# Patient Record
Sex: FEMALE | Race: WHITE | NOT HISPANIC OR LATINO | Employment: OTHER | URBAN - METROPOLITAN AREA
[De-identification: names, ages, dates, MRNs, and addresses within clinical notes are randomized per-mention and may not be internally consistent; named-entity substitution may affect disease eponyms.]

---

## 2017-05-02 ENCOUNTER — HOSPITAL ENCOUNTER (OUTPATIENT)
Dept: RADIOLOGY | Facility: HOSPITAL | Age: 62
Discharge: HOME | End: 2017-05-02
Payer: COMMERCIAL

## 2017-05-02 ENCOUNTER — TRANSCRIBE ORDERS (OUTPATIENT)
Dept: ADMINISTRATIVE | Facility: HOSPITAL | Age: 62
End: 2017-05-02

## 2017-05-02 ENCOUNTER — APPOINTMENT (OUTPATIENT)
Dept: LAB | Facility: HOSPITAL | Age: 62
End: 2017-05-02
Payer: COMMERCIAL

## 2017-05-02 DIAGNOSIS — R07.1 PAINFUL RESPIRATION: Primary | ICD-10-CM

## 2017-05-02 DIAGNOSIS — R79.1 ABNORMAL BLOOD COAGULATION PROFILE: ICD-10-CM

## 2017-05-02 LAB — DEPRECATED D DIMER PPP: 200 NG/ML (FEU) (ref 190–520)

## 2017-05-02 PROCEDURE — 85379 FIBRIN DEGRADATION QUANT: CPT | Performed by: INTERNAL MEDICINE

## 2017-05-02 PROCEDURE — 71020 HB CHEST X-RAY 2VW FRONTAL&LATL: CPT

## 2017-05-02 PROCEDURE — 36415 COLL VENOUS BLD VENIPUNCTURE: CPT | Performed by: INTERNAL MEDICINE

## 2017-05-03 ENCOUNTER — HOSPITAL ENCOUNTER (OUTPATIENT)
Dept: RADIOLOGY | Facility: HOSPITAL | Age: 62
Discharge: HOME | End: 2017-05-03
Payer: COMMERCIAL

## 2017-05-03 DIAGNOSIS — R07.1 PAINFUL RESPIRATION: ICD-10-CM

## 2017-05-03 DIAGNOSIS — R79.1 ABNORMAL BLOOD COAGULATION PROFILE: ICD-10-CM

## 2017-05-03 PROCEDURE — 71275 CT ANGIOGRAPHY CHEST: CPT

## 2017-05-03 RX ADMIN — IOHEXOL 85 ML: 350 INJECTION, SOLUTION INTRAVENOUS at 15:29

## 2018-10-22 ENCOUNTER — TRANSCRIBE ORDERS (OUTPATIENT)
Dept: ADMINISTRATIVE | Facility: HOSPITAL | Age: 63
End: 2018-10-22

## 2018-10-22 DIAGNOSIS — R10.13 EPIGASTRIC PAIN: Primary | ICD-10-CM

## 2018-10-26 ENCOUNTER — HOSPITAL ENCOUNTER (OUTPATIENT)
Dept: RADIOLOGY | Facility: HOSPITAL | Age: 63
Discharge: HOME | End: 2018-10-26
Attending: INTERNAL MEDICINE
Payer: COMMERCIAL

## 2018-10-26 DIAGNOSIS — R10.13 EPIGASTRIC PAIN: ICD-10-CM

## 2018-10-26 PROCEDURE — 74246 X-RAY XM UPR GI TRC 2CNTRST: CPT

## 2019-02-14 ENCOUNTER — APPOINTMENT (EMERGENCY)
Dept: RADIOLOGY | Facility: HOSPITAL | Age: 64
End: 2019-02-14
Payer: COMMERCIAL

## 2019-02-14 ENCOUNTER — HOSPITAL ENCOUNTER (EMERGENCY)
Facility: HOSPITAL | Age: 64
Discharge: HOME | End: 2019-02-14
Attending: EMERGENCY MEDICINE | Admitting: EMERGENCY MEDICINE
Payer: COMMERCIAL

## 2019-02-14 VITALS
TEMPERATURE: 97.8 F | RESPIRATION RATE: 15 BRPM | SYSTOLIC BLOOD PRESSURE: 132 MMHG | HEART RATE: 90 BPM | DIASTOLIC BLOOD PRESSURE: 79 MMHG | OXYGEN SATURATION: 97 %

## 2019-02-14 DIAGNOSIS — R07.9 CHEST PAIN: Primary | ICD-10-CM

## 2019-02-14 LAB
ALBUMIN SERPL BCP-MCNC: 4.1 G/DL (ref 3.5–5)
ALP SERPL-CCNC: 73 U/L (ref 46–116)
ALT SERPL W P-5'-P-CCNC: 27 U/L (ref 12–78)
ANION GAP SERPL CALCULATED.3IONS-SCNC: 7 MMOL/L (ref 4–13)
APTT PPP: 24 SECONDS (ref 26–38)
AST SERPL W P-5'-P-CCNC: 24 U/L (ref 5–45)
BASOPHILS # BLD AUTO: 0.05 THOUSANDS/ΜL (ref 0–0.1)
BASOPHILS NFR BLD AUTO: 1 % (ref 0–1)
BILIRUB SERPL-MCNC: 0.5 MG/DL (ref 0.2–1)
BUN SERPL-MCNC: 17 MG/DL (ref 5–25)
CALCIUM SERPL-MCNC: 8.9 MG/DL (ref 8.3–10.1)
CHLORIDE SERPL-SCNC: 100 MMOL/L (ref 100–108)
CO2 SERPL-SCNC: 32 MMOL/L (ref 21–32)
CREAT SERPL-MCNC: 0.78 MG/DL (ref 0.6–1.3)
DEPRECATED D DIMER PPP: 220 NG/ML (FEU) (ref 190–520)
EOSINOPHIL # BLD AUTO: 0.29 THOUSAND/ΜL (ref 0–0.61)
EOSINOPHIL NFR BLD AUTO: 4 % (ref 0–6)
ERYTHROCYTE [DISTWIDTH] IN BLOOD BY AUTOMATED COUNT: 12 % (ref 11.6–15.1)
GFR SERPL CREATININE-BSD FRML MDRD: 82 ML/MIN/1.73SQ M
GLUCOSE SERPL-MCNC: 109 MG/DL (ref 65–140)
HCT VFR BLD AUTO: 41 % (ref 34.8–46.1)
HGB BLD-MCNC: 13.2 G/DL (ref 11.5–15.4)
IMM GRANULOCYTES # BLD AUTO: 0.02 THOUSAND/UL (ref 0–0.2)
IMM GRANULOCYTES NFR BLD AUTO: 0 % (ref 0–2)
INR PPP: 0.94 (ref 0.86–1.16)
LYMPHOCYTES # BLD AUTO: 1.31 THOUSANDS/ΜL (ref 0.6–4.47)
LYMPHOCYTES NFR BLD AUTO: 20 % (ref 14–44)
MCH RBC QN AUTO: 34.8 PG (ref 26.8–34.3)
MCHC RBC AUTO-ENTMCNC: 32.2 G/DL (ref 31.4–37.4)
MCV RBC AUTO: 108 FL (ref 82–98)
MONOCYTES # BLD AUTO: 0.63 THOUSAND/ΜL (ref 0.17–1.22)
MONOCYTES NFR BLD AUTO: 10 % (ref 4–12)
NEUTROPHILS # BLD AUTO: 4.25 THOUSANDS/ΜL (ref 1.85–7.62)
NEUTS SEG NFR BLD AUTO: 65 % (ref 43–75)
NRBC BLD AUTO-RTO: 0 /100 WBCS
NT-PROBNP SERPL-MCNC: 28 PG/ML
PLATELET # BLD AUTO: 260 THOUSANDS/UL (ref 149–390)
PMV BLD AUTO: 9.3 FL (ref 8.9–12.7)
POTASSIUM SERPL-SCNC: 3.9 MMOL/L (ref 3.5–5.3)
PROT SERPL-MCNC: 6.7 G/DL (ref 6.4–8.2)
PROTHROMBIN TIME: 9.9 SECONDS (ref 9.4–11.7)
RBC # BLD AUTO: 3.79 MILLION/UL (ref 3.81–5.12)
SODIUM SERPL-SCNC: 139 MMOL/L (ref 136–145)
TROPONIN I SERPL-MCNC: <0.02 NG/ML
TROPONIN I SERPL-MCNC: <0.02 NG/ML
WBC # BLD AUTO: 6.55 THOUSAND/UL (ref 4.31–10.16)

## 2019-02-14 PROCEDURE — 96360 HYDRATION IV INFUSION INIT: CPT

## 2019-02-14 PROCEDURE — 85379 FIBRIN DEGRADATION QUANT: CPT | Performed by: EMERGENCY MEDICINE

## 2019-02-14 PROCEDURE — 85730 THROMBOPLASTIN TIME PARTIAL: CPT | Performed by: EMERGENCY MEDICINE

## 2019-02-14 PROCEDURE — 85610 PROTHROMBIN TIME: CPT | Performed by: EMERGENCY MEDICINE

## 2019-02-14 PROCEDURE — 93005 ELECTROCARDIOGRAM TRACING: CPT

## 2019-02-14 PROCEDURE — 99285 EMERGENCY DEPT VISIT HI MDM: CPT

## 2019-02-14 PROCEDURE — 80053 COMPREHEN METABOLIC PANEL: CPT | Performed by: EMERGENCY MEDICINE

## 2019-02-14 PROCEDURE — 85025 COMPLETE CBC W/AUTO DIFF WBC: CPT | Performed by: EMERGENCY MEDICINE

## 2019-02-14 PROCEDURE — 83880 ASSAY OF NATRIURETIC PEPTIDE: CPT | Performed by: EMERGENCY MEDICINE

## 2019-02-14 PROCEDURE — 71045 X-RAY EXAM CHEST 1 VIEW: CPT

## 2019-02-14 PROCEDURE — 36415 COLL VENOUS BLD VENIPUNCTURE: CPT | Performed by: EMERGENCY MEDICINE

## 2019-02-14 PROCEDURE — 84484 ASSAY OF TROPONIN QUANT: CPT | Performed by: EMERGENCY MEDICINE

## 2019-02-14 RX ORDER — NITROGLYCERIN 0.4 MG/1
0.4 TABLET SUBLINGUAL ONCE
Status: COMPLETED | OUTPATIENT
Start: 2019-02-14 | End: 2019-02-14

## 2019-02-14 RX ADMIN — SODIUM CHLORIDE 1000 ML: 0.9 INJECTION, SOLUTION INTRAVENOUS at 19:52

## 2019-02-14 RX ADMIN — NITROGLYCERIN 0.4 MG: 0.4 TABLET SUBLINGUAL at 19:52

## 2019-02-15 NOTE — DISCHARGE INSTRUCTIONS
If any worsening of symptoms please come back to the emergency department    Please follow-up with the cardiologist like we discussed

## 2019-02-15 NOTE — ED PROVIDER NOTES
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  No fevers or chills  States for the past few days she has been short of breath  Unable to tell me if it is worse with ambulation or exertion  Denies any trauma  No history of PE or DVT  66 year female that presents with chest pain  Patient has no obvious trauma  Will do a cardiac workup including a D-dimer  None       Past Medical History:   Diagnosis Date    Anemia     Cancer [facility])        History reviewed  No pertinent surgical history  History reviewed  No pertinent family history  I have reviewed and agree with the history as documented  Social History     Tobacco Use    Smoking status: Former Smoker    Smokeless tobacco: Never Used   Substance Use Topics    Alcohol use: Yes     Comment: liter nightly    Drug use: Never        Review of Systems   Constitutional: Negative  Negative for diaphoresis and fever  HENT: Negative  Respiratory: Positive for shortness of breath  Negative for cough and wheezing  Cardiovascular: Positive for chest pain  Negative for palpitations and leg swelling  Gastrointestinal: Negative for abdominal distention, abdominal pain, nausea and vomiting  Genitourinary: Negative  Musculoskeletal: Negative  Skin: Negative  Neurological: Negative  Psychiatric/Behavioral: Negative  All other systems reviewed and are negative        Physical Exam  Physical Exam   Constitutional: She is oriented to person, place, and time  She appears well-developed and well-nourished  Non-toxic appearance  She does not appear ill  No distress  HENT:   Head: Normocephalic and atraumatic  Eyes: Pupils are equal, round, and reactive to light  EOM are normal    Neck: Normal range of motion  Neck supple  Cardiovascular: Normal rate, regular rhythm, normal heart sounds and normal pulses  No murmur heard  Pulmonary/Chest: Effort normal and breath sounds normal  No stridor  No respiratory distress  Abdominal: Soft  Bowel sounds are normal  She exhibits no distension  There is no tenderness  There is no rebound and no guarding  Musculoskeletal: Normal range of motion  Right lower leg: Normal  She exhibits no edema  Left lower leg: Normal  She exhibits no edema  Neurological: She is alert and oriented to person, place, and time  Skin: Skin is warm and dry  Capillary refill takes less than 2 seconds  Psychiatric: She has a normal mood and affect  Vitals reviewed        Vital Signs  ED Triage Vitals   Temperature Pulse Respirations Blood Pressure SpO2   02/14/19 2016 02/14/19 1915 02/14/19 1915 02/14/19 1915 02/14/19 1915   97 8 °F (36 6 °C) 100 (!) 24 139/87 95 %      Temp src Heart Rate Source Patient Position - Orthostatic VS BP Location FiO2 (%)   -- -- -- 02/14/19 2015 --      Left arm       Pain Score       02/14/19 2014       No Pain           Vitals:    02/14/19 2100 02/14/19 2130 02/14/19 2145 02/14/19 2200   BP: 134/90 132/81 127/79 132/79   Pulse: 96 96 90 90       Visual Acuity      ED Medications  Medications   sodium chloride 0 9 % bolus 1,000 mL (0 mL Intravenous Stopped 2/14/19 2121)   nitroglycerin (NITROSTAT) SL tablet 0 4 mg (0 4 mg Sublingual Given 2/14/19 1952)       Diagnostic Studies  Results Reviewed     Procedure Component Value Units Date/Time    Troponin I [[id number]]  (Normal) Collected:  02/14/19 2155    Lab Status:  Final result Specimen:  Blood from Arm, Right Updated:  02/14/19 2219     Troponin I <0 02 ng/mL     D-Dimer [[id number]]  (Normal) Collected:  02/14/19 1921    Lab Status:  Final result Specimen:  Blood from Arm, Left Updated:  02/14/19 2038     D-Dimer, Quant 220 ng/ml (FEU)     B-type natriuretic peptide [[id number]]  (Normal) Collected:  02/14/19 1921    Lab Status:  Final result Specimen:  Blood from Arm, Left Updated:  02/14/19 1951     NT-proBNP 28 pg/mL     Troponin I [[id number]]  (Normal) Collected:  02/14/19 1921    Lab Status:  Final result Specimen:  Blood from Arm, Left Updated:  02/14/19 1950     Troponin I <0 02 ng/mL     Protime-INR [[id number]]  (Normal) Collected:  02/14/19 1921    Lab Status:  Final result Specimen:  Blood from Arm, Left Updated:  02/14/19 1946     Protime 9 9 seconds      INR 0 94    APTT [[id number]]  (Abnormal) Collected:  02/14/19 1921    Lab Status:  Final result Specimen:  Blood from Arm, Left Updated:  02/14/19 1946     PTT 24 seconds     Comprehensive metabolic panel [[id number]] Collected:  02/14/19 1921    Lab Status:  Final result Specimen:  Blood from Arm, Left Updated:  02/14/19 1945     Sodium 139 mmol/L      Potassium 3 9 mmol/L      Chloride 100 mmol/L      CO2 32 mmol/L      ANION GAP 7 mmol/L      BUN 17 mg/dL      Creatinine 0 78 mg/dL      Glucose 109 mg/dL      Calcium 8 9 mg/dL      AST 24 U/L      ALT 27 U/L      Alkaline Phosphatase 73 U/L      Total Protein 6 7 g/dL      Albumin 4 1 g/dL      Total Bilirubin 0 50 mg/dL      eGFR 82 ml/min/1 73sq m     Narrative:       National Kidney Disease Education Program recommendations are as follows:  GFR calculation is accurate only with a steady state creatinine  Chronic Kidney disease less than 60 ml/min/1 73 sq  meters  Kidney failure less than 15 ml/min/1 73 sq  meters      CBC and differential [[id number]]  (Abnormal) Collected:  02/14/19 1921    Lab Status:  Final result Specimen:  Blood from Arm, Left Updated:  02/14/19 1928     WBC 6 55 Thousand/uL      RBC 3 79 Million/uL      Hemoglobin 13 2 g/dL      Hematocrit 41 0 %       fL      MCH 34 8 pg      MCHC 32 2 g/dL      RDW 12 0 %      MPV 9 3 fL      Platelets 232 Thousands/uL      nRBC 0 /100 WBCs      Neutrophils Relative 65 %      Immat GRANS % 0 %      Lymphocytes Relative 20 %      Monocytes Relative 10 %      Eosinophils Relative 4 %      Basophils Relative 1 %      Neutrophils Absolute 4 25 Thousands/µL      Immature Grans Absolute 0 02 Thousand/uL      Lymphocytes Absolute 1 31 Thousands/µL      Monocytes Absolute 0 63 Thousand/µL      Eosinophils Absolute 0 29 Thousand/µL      Basophils Absolute 0 05 Thousands/µL                  X-ray chest 1 view portable    (Results Pending)              Procedures  Procedures       Phone Contacts  ED Phone Contact    ED Course  ED Course as of Feb 14 2330   Thu Feb 14, 2019 2235 Repeat troponin was negative will discharge patient home  Patient is chest pain-free currently  I discussed with patient following up with Cardiology  Patient understands the plan she will follow up with Dr Giovani West     Advised to return if any worsening of symptoms patient understands the plan            HEART Risk Score      Most Recent Value   History  0 Filed at: 02/14/2019 2041   ECG  1 Filed at: 02/14/2019 2041   Age  1 Filed at: 02/14/2019 2041   Risk Factors  0 Filed at: 02/14/2019 2041   Troponin  0 Filed at: 02/14/2019 2041   Heart Score Risk Calculator   History  0 Filed at: 02/14/2019 2041   ECG  1 Filed at: 02/14/2019 2041   Age  1 Filed at: 02/14/2019 2041   Risk Factors  0 Filed at: 02/14/2019 2041   Troponin  0 Filed at: 02/14/2019 2041   HEART Score  2 Filed at: 02/14/2019 2041   HEART Score  2 Filed at: 02/14/2019 2041                            MDM    Disposition  Final diagnoses:   Chest pain     Time reflects when diagnosis was documented in both MDM as applicable and the Disposition within this note     Time User Action Codes Description Comment    2/14/2019 10:36 PM Sethperri Radha Add [R07 9] Chest pain       ED Disposition     ED Disposition Condition Date/Time Comment    Discharge Stable Thu Feb 14, 2019 10:36 PM [address] [place] [address] discharge to home/self care  Follow-up Information     Follow up With Specialties Details Why Contact Info    Salinas Yuan MD Internal Medicine Schedule an appointment as soon as possible for a visit   [address] [place] [address]  [phone]      Catia Bowden MD Cardiology Schedule an appointment as soon as possible for a visit   [address]  [facility]  [phone]            There are no discharge medications for this patient  No discharge procedures on file      ED Provider  Electronically Signed by           Dontae Herrera MD  02/14/19 6049

## 2019-02-18 LAB
ATRIAL RATE: 96 BPM
P AXIS: 68 DEGREES
PR INTERVAL: 148 MS
QRS AXIS: 25 DEGREES
QRSD INTERVAL: 92 MS
QT INTERVAL: 370 MS
QTC INTERVAL: 467 MS
T WAVE AXIS: 55 DEGREES
VENTRICULAR RATE: 96 BPM

## 2019-02-18 PROCEDURE — 93010 ELECTROCARDIOGRAM REPORT: CPT | Performed by: INTERNAL MEDICINE

## 2019-11-11 ENCOUNTER — TRANSCRIBE ORDERS (OUTPATIENT)
Dept: NEUROSURGERY | Facility: CLINIC | Age: 64
End: 2019-11-11

## 2019-11-11 DIAGNOSIS — M54.50 LOWER BACK PAIN: Primary | ICD-10-CM

## 2019-12-09 ENCOUNTER — CONSULT (OUTPATIENT)
Dept: NEUROSURGERY | Facility: CLINIC | Age: 64
End: 2019-12-09
Payer: COMMERCIAL

## 2019-12-09 VITALS
WEIGHT: 213 LBS | DIASTOLIC BLOOD PRESSURE: 82 MMHG | HEIGHT: 64 IN | SYSTOLIC BLOOD PRESSURE: 118 MMHG | BODY MASS INDEX: 36.37 KG/M2 | TEMPERATURE: 98.5 F | RESPIRATION RATE: 16 BRPM

## 2019-12-09 DIAGNOSIS — G89.4 CHRONIC PAIN SYNDROME: Primary | ICD-10-CM

## 2019-12-09 DIAGNOSIS — M54.50 LOWER BACK PAIN: ICD-10-CM

## 2019-12-09 PROCEDURE — 99204 OFFICE O/P NEW MOD 45 MIN: CPT | Performed by: NEUROLOGICAL SURGERY

## 2019-12-09 RX ORDER — LECITHIN/GINKGO/S.GINSENG/GOTU 50-150-250
TABLET ORAL
COMMUNITY
End: 2021-09-21 | Stop reason: HOSPADM

## 2019-12-09 RX ORDER — LEVOTHYROXINE SODIUM 0.05 MG/1
50 TABLET ORAL DAILY
COMMUNITY
Start: 2019-08-06 | End: 2020-11-11

## 2019-12-09 RX ORDER — HYDROCHLOROTHIAZIDE 12.5 MG/1
CAPSULE, GELATIN COATED ORAL
COMMUNITY
Start: 2019-12-05 | End: 2021-09-21 | Stop reason: HOSPADM

## 2019-12-09 RX ORDER — TRAZODONE HYDROCHLORIDE 100 MG/1
TABLET ORAL
COMMUNITY
End: 2021-06-10

## 2019-12-09 RX ORDER — ALPRAZOLAM 0.25 MG/1
0.25 TABLET ORAL
COMMUNITY
Start: 2016-04-11 | End: 2020-10-15

## 2019-12-09 RX ORDER — BLACK COHOSH ROOT EXTRACT 80 MG
CAPSULE ORAL
COMMUNITY

## 2019-12-09 RX ORDER — OXYCODONE AND ACETAMINOPHEN 7.5; 325 MG/1; MG/1
TABLET ORAL
COMMUNITY
Start: 2016-03-09 | End: 2020-11-14 | Stop reason: HOSPADM

## 2019-12-09 RX ORDER — GABAPENTIN 300 MG/1
CAPSULE ORAL
COMMUNITY
Start: 2017-03-28

## 2019-12-09 RX ORDER — ASCORBIC ACID 250 MG
100 TABLET ORAL DAILY
COMMUNITY

## 2019-12-09 RX ORDER — ESOMEPRAZOLE MAGNESIUM 40 MG/1
20 CAPSULE, DELAYED RELEASE ORAL
COMMUNITY
End: 2020-10-15

## 2019-12-09 RX ORDER — CYCLOBENZAPRINE HCL 10 MG
TABLET ORAL 3 TIMES DAILY
COMMUNITY

## 2019-12-09 RX ORDER — LISINOPRIL AND HYDROCHLOROTHIAZIDE 12.5; 1 MG/1; MG/1
TABLET ORAL
COMMUNITY
Start: 2016-09-19

## 2019-12-09 RX ORDER — FENTANYL 50 UG/H
PATCH TRANSDERMAL
COMMUNITY
End: 2020-11-14 | Stop reason: HOSPADM

## 2019-12-09 RX ORDER — BEPOTASTINE BESILATE 15 MG/ML
SOLUTION/ DROPS OPHTHALMIC DAILY
COMMUNITY

## 2019-12-09 NOTE — PROGRESS NOTES
Assessment/Plan:    No problem-specific Assessment & Plan notes found for this encounter  History, physical examination and diagnostic tests were reviewed and questions answered  Diagnosis, care plan and treatment options were discussed  The patient understand instructions and will follow up as directed  Patient with coronal deformity, sagittal deformity and low back and anterior thigh leg pain  The patient had a successful stimulator trial but her goals including standing straighter as well as pain  I explained that deformity correction may help back and leg pain but given the complexity of her situation she might be better served with a stimulator for pain symptoms only however standing straighter would not be accomplished by stimulation  She should still follow-up with her spine surgeon  I told her I would be happy to implant but I did express and explain the limitations of neuromodulation      IMAGING REVIEWED: MRI lumbar shows multilevel deformity coronally and with multiple levels of neuroforaminal stenosis       Diagnoses and all orders for this visit:    Lower back pain  -     Ambulatory referral to Neurosurgery    Other orders  -     ALPRAZolam (XANAX) 0 25 mg tablet; Take 0 25 mg by mouth  -     bepotastine besilate (BEPREVE) 1 5 % op soln; Apply to eye daily  -     Calcium Carb-Ergocalciferol 250-125 MG-UNIT TABS; calcium carb-ergocalciferol (vit D2) 250 mg (625 mg)-125 unit tablet   1 tbl by oral route  -     denosumab (PROLIA) 60 mg/mL; Every 6 months  -     cyclobenzaprine (FLEXERIL) 10 mg tablet; 3 (three) times a day  -     esomeprazole (NEXIUM) 40 MG capsule;  Take by mouth  -     fentaNYL (DURAGESIC) 50 mcg/hr; fentanyl 50 mcg/hr transdermal patch   Apply 1 patch every 72 hours by transdermal route as directed for 30 days   -     gabapentin (NEURONTIN) 300 mg capsule; take 2 capsules by mouth three times a day  -     hydrochlorothiazide (MICROZIDE) 12 5 mg capsule; TAKE 1 CAPSULE BY MOUTH DAILY  -     levothyroxine 50 mcg tablet; Take 50 mcg by mouth daily  -     linaCLOtide 145 MCG CAPS; Take 145 mcg by mouth daily  -     lisinopril-hydrochlorothiazide (PRINZIDE,ZESTORETIC) 10-12 5 MG per tablet; lisinopril 10 mg-hydrochlorothiazide 12 5 mg tablet  -     oxyCODONE-acetaminophen (PERCOCET) 7 5-325 MG per tablet; Every 6 hours  -     traZODone (DESYREL) 100 mg tablet; trazodone 100 mg tablet  -     Omega 3-6-9 Fatty Acids (OMEGA 3-6-9 COMPLEX) CAPS; omega 3,6,9 combination no 7 92 mg (43 mg-22 [id number]) chew tablet  -     Multiple Vitamins-Minerals (ONE DAILY WOMENS 50 PLUS PO); One Daily Womens 50 Plus  -     Aloe Vera 25 MG CAPS; Take by mouth  -     Ferrous Sulfate 134 MG TABS; Take by mouth  -     ascorbic acid (VITAMIN C) 250 MG tablet; Take 500 mg by mouth daily  -     Cholecalciferol 100 MCG (4000 UT) CAPS; cholecalciferol (vitamin D3) 25 mcg (1,000 unit) tablet   3000 units by oral route  -     Calcium 150 MG TABS; Take by mouth        I have spent 60 minutes with Patient and family today in which greater than 50% of this time was spent in counseling/coordination of care regarding Diagnostic results, Prognosis, Risks and benefits of tx options, Intructions for management, Patient and family education, Importance of tx compliance, Risk factor reductions and Impressions  Subjective:      Patient ID: Courtney Lefort is a 60 y o  female  Patient is a 60year old female with symptoms of low back and bilateral leg pain  Pain is severe and throbbing in quality  Pain distribution is low back and bilateral legs  Pain is worse with standing and walking  Pain is improved by rest  The pain has been present for several years  Pain has associated distress and disability  The patient has seen prior spine surgeons who explained the morbidity of the surgery   She had a successful medtronic stimulator trial but reports that she would like more definitive treatment of her condition  The following portions of the patient's history were reviewed and updated as appropriate:   She  has a past medical history of Anemia and Cancer ([facility] [address] )  She There are no active problems to display for this patient  She  has no past surgical history on file  Her family history is not on file  She  reports that she has quit smoking  She has never used smokeless tobacco  She reports that she drinks alcohol  She reports that she does not use drugs  Current Outpatient Medications   Medication Sig Dispense Refill    ALPRAZolam (XANAX) 0 25 mg tablet Take 0 25 mg by mouth      Calcium 150 MG TABS Take by mouth      gabapentin (NEURONTIN) 300 mg capsule take 2 capsules by mouth three times a day      hydrochlorothiazide (MICROZIDE) 12 5 mg capsule TAKE 1 CAPSULE BY MOUTH  DAILY      levothyroxine 50 mcg tablet Take 50 mcg by mouth daily      linaCLOtide 145 MCG CAPS Take 145 mcg by mouth daily      lisinopril-hydrochlorothiazide (PRINZIDE,ZESTORETIC) 10-12 5 MG per tablet lisinopril 10 mg-hydrochlorothiazide 12 5 mg tablet      oxyCODONE-acetaminophen (PERCOCET) 7 5-325 MG per tablet Every 6 hours      Aloe Vera 25 MG CAPS Take by mouth      ascorbic acid (VITAMIN C) 250 MG tablet Take 500 mg by mouth daily      bepotastine besilate (BEPREVE) 1 5 % op soln Apply to eye daily      Calcium Carb-Ergocalciferol 250-125 MG-UNIT TABS calcium carb-ergocalciferol (vit D2) 250 mg (625 mg)-125 unit tablet   1 tbl by oral route   Cholecalciferol 100 MCG (4000 UT) CAPS cholecalciferol (vitamin D3) 25 mcg (1,000 unit) tablet   3000 units by oral route   cyclobenzaprine (FLEXERIL) 10 mg tablet 3 (three) times a day      denosumab (PROLIA) 60 mg/mL Every 6 months      esomeprazole (NEXIUM) 40 MG capsule Take by mouth      fentaNYL (DURAGESIC) 50 mcg/hr fentanyl 50 mcg/hr transdermal patch   Apply 1 patch every 72 hours by transdermal route as directed for 30 days        Ferrous Sulfate 134 MG TABS Take by mouth      Multiple Vitamins-Minerals (ONE DAILY WOMENS 50 PLUS PO) One Daily Womens 50 Plus      Omega 3-6-9 Fatty Acids (OMEGA 3-6-9 COMPLEX) CAPS omega 3,6,9 combination no 7 92 mg (43 mg-22 [id number]) chew tablet      traZODone (DESYREL) 100 mg tablet trazodone 100 mg tablet       No current facility-administered medications for this visit  Current Outpatient Medications on File Prior to Visit   Medication Sig    ALPRAZolam (XANAX) 0 25 mg tablet Take 0 25 mg by mouth    Calcium 150 MG TABS Take by mouth    gabapentin (NEURONTIN) 300 mg capsule take 2 capsules by mouth three times a day    hydrochlorothiazide (MICROZIDE) 12 5 mg capsule TAKE 1 CAPSULE BY MOUTH  DAILY    levothyroxine 50 mcg tablet Take 50 mcg by mouth daily    linaCLOtide 145 MCG CAPS Take 145 mcg by mouth daily    lisinopril-hydrochlorothiazide (PRINZIDE,ZESTORETIC) 10-12 5 MG per tablet lisinopril 10 mg-hydrochlorothiazide 12 5 mg tablet    oxyCODONE-acetaminophen (PERCOCET) 7 5-325 MG per tablet Every 6 hours    Aloe Vera 25 MG CAPS Take by mouth    ascorbic acid (VITAMIN C) 250 MG tablet Take 500 mg by mouth daily    bepotastine besilate (BEPREVE) 1 5 % op soln Apply to eye daily    Calcium Carb-Ergocalciferol 250-125 MG-UNIT TABS calcium carb-ergocalciferol (vit D2) 250 mg (625 mg)-125 unit tablet   1 tbl by oral route   Cholecalciferol 100 MCG (4000 UT) CAPS cholecalciferol (vitamin D3) 25 mcg (1,000 unit) tablet   3000 units by oral route   cyclobenzaprine (FLEXERIL) 10 mg tablet 3 (three) times a day    denosumab (PROLIA) 60 mg/mL Every 6 months    esomeprazole (NEXIUM) 40 MG capsule Take by mouth    fentaNYL (DURAGESIC) 50 mcg/hr fentanyl 50 mcg/hr transdermal patch   Apply 1 patch every 72 hours by transdermal route as directed for 30 days      Ferrous Sulfate 134 MG TABS Take by mouth    Multiple Vitamins-Minerals (ONE DAILY WOMENS 50 PLUS PO) One Daily Womens 50 Plus    Omega 3-6-9 Fatty Acids (OMEGA 3-6-9 COMPLEX) CAPS omega 3,6,9 combination no 7 92 mg (43 mg-22 [id number]) chew tablet    traZODone (DESYREL) 100 mg tablet trazodone 100 mg tablet     No current facility-administered medications on file prior to visit  She is allergic to cefaclor; ciprofloxacin; sulfa antibiotics; sulfamethoxazole-trimethoprim; talwin [pentazocine]; vicodin [hydrocodone-acetaminophen]; and ondansetron       Review of Systems   Constitutional: Positive for fatigue  HENT: Negative  Eyes: Negative  Respiratory: Negative  Cardiovascular: Negative  Gastrointestinal: Positive for constipation  Endocrine: Negative  Genitourinary: Negative  Musculoskeletal: Positive for back pain (LBP across waist, hip buttock) and gait problem  Allergic/Immunologic: Negative  Neurological: Positive for weakness (bilateral) and numbness (bilateral leg numbness)  Objective:      /82 (BP Location: Left arm)   Temp 98 5 °F (36 9 °C) (Tympanic)   Resp 16   Ht 5' 4" (1 626 m)   Wt 96 6 kg (213 lb)   BMI 36 56 kg/m²          Physical Exam   Constitutional: She is oriented to person, place, and time  She appears well-developed and well-nourished  No distress  HENT:   Head: Normocephalic and atraumatic  Nose: Nose normal    Eyes: Pupils are equal, round, and reactive to light  Conjunctivae and EOM are normal    Neck: Normal range of motion  No tracheal deviation present  No thyromegaly present  Cardiovascular: Normal rate  Pulmonary/Chest: Effort normal and breath sounds normal  No respiratory distress  Abdominal: Soft  Musculoskeletal: She exhibits deformity  Kyphotic and coronal deformity noted   Neurological: She is alert and oriented to person, place, and time  She has normal strength  No cranial nerve deficit or sensory deficit  Skin: Skin is warm and dry  No rash noted  She is not diaphoretic  No erythema  No pallor

## 2020-02-04 ENCOUNTER — OFFICE VISIT (OUTPATIENT)
Dept: BARIATRICS | Facility: CLINIC | Age: 65
End: 2020-02-04
Payer: COMMERCIAL

## 2020-02-04 VITALS
HEIGHT: 63 IN | WEIGHT: 211.5 LBS | BODY MASS INDEX: 37.47 KG/M2 | DIASTOLIC BLOOD PRESSURE: 70 MMHG | TEMPERATURE: 97.4 F | SYSTOLIC BLOOD PRESSURE: 128 MMHG | HEART RATE: 78 BPM

## 2020-02-04 DIAGNOSIS — K91.2 POSTSURGICAL MALABSORPTION: ICD-10-CM

## 2020-02-04 DIAGNOSIS — E03.9 HYPOTHYROID: ICD-10-CM

## 2020-02-04 DIAGNOSIS — E66.9 OBESITY, CLASS II, BMI 35-39.9: ICD-10-CM

## 2020-02-04 DIAGNOSIS — M54.50 LOW BACK PAIN: ICD-10-CM

## 2020-02-04 DIAGNOSIS — R10.9 ABDOMINAL PAIN: ICD-10-CM

## 2020-02-04 DIAGNOSIS — Z98.84 BARIATRIC SURGERY STATUS: Primary | ICD-10-CM

## 2020-02-04 PROBLEM — Z85.72 HISTORY OF NON-HODGKIN'S LYMPHOMA: Status: ACTIVE | Noted: 2020-02-04

## 2020-02-04 PROBLEM — E66.812 OBESITY, CLASS II, BMI 35-39.9: Status: ACTIVE | Noted: 2020-02-04

## 2020-02-04 PROCEDURE — 99203 OFFICE O/P NEW LOW 30 MIN: CPT | Performed by: PHYSICIAN ASSISTANT

## 2020-02-04 RX ORDER — PROCHLORPERAZINE MALEATE 10 MG
10 TABLET ORAL EVERY 6 HOURS PRN
COMMUNITY
End: 2021-09-21 | Stop reason: HOSPADM

## 2020-02-04 RX ORDER — DOCUSATE SODIUM 100 MG/1
100 CAPSULE, LIQUID FILLED ORAL
Status: ON HOLD | COMMUNITY
End: 2021-09-21 | Stop reason: SDUPTHER

## 2020-02-04 NOTE — H&P (VIEW-ONLY)
Assessment/Plan:  Patient seen at [facility] office      Patient ID: Rocael Mascorro is a 60 y o  female  Bariatric Surgery Status    -s/p Gia-En-Y Gastric Bypass with Dr Elena Sol in 2008  Presents to the office today for overdue annual follow up with complaint of epigastric pain and nausea as well as weight regain  · Continued/Maintain healthy weight loss with good nutrition intakes  · Adequate hydration with at least 64oz  fluid intake  · Follow diet as discussed  · Follow vitamin and mineral recommendations as reviewed with you  · Exercise as tolerated  · Colonoscopy referral made: no    · Follow-up after EGD  We kindly ask that your arrive 15 minutes before your scheduled appointment time with your provider to allow our staff to room you, get your vital signs and update your chart  · Get lab work done  Please call the office if you need a script  It is recommended to check with your insurance BEFORE getting labs done to make sure they are covered by your policy  · Call our office if you have any problems with abdominal pain especially associated with fever, chills, nausea, vomiting or any other concerns  · All  Post-bariatric surgery patients should be aware that very small quantities of any alcohol can cause impairment and it is very possible not to feel the effect  The effect can be in the system for several hours  It is also a stomach irritant  · It is advised to AVOID alcohol, Nonsteroidal antiinflammatory drugs (NSAIDS) and nicotine of all forms   Any of these can cause stomach irritation/pain  · Discussed the effects of alcohol on a bariatric patient and the increased impairment risk  · Keep up the good work! Abdominal pain  - sxs of epigastric pain and nausea over the past several months  Sxs can develop with or without PO intake   - currently taking Nexium 40 mg daily   Will continue   - EGD with Dr Ede Andino and follow up thereafter    Obesity, Class II, BMI 35-39 9  - complains of weight regain over the past 3-5 years due to back pain which limits her physical activity   - currently on multiple medications that can contribute to weight gain   - EGD to evaluate bypass anatomy   - discussed options with patient including MWM if bypass is unremarkable and she does not qualify for revision   - encourage exercise as tolerated, 30/60 min rule, appropriate diet and lifestyle changes as discussed  - bariatric manual provided to help pt return back to basics    Low back pain  - back injury over 30 years ago with complications including severe lower back pain and lumbar radiculopathy, worsening over the past several years  - pt sts possible lower back surgery in April but would like to lose weight prior to ease the healing process  Consider MWM  Hypothyroid  - continue Synthroid     Postsurgical Malabsorption   -At risk for malabsorption of vitamins/minerals secondary to malabsorption and restriction of intake from bariatric surgery  Currently taking adequate postop bariatric surgery vitamin supplementation  - list of recommendations provided for both regular and bariatric vitamins- should be taking 2 multivitamins if chooses regular OTC vitamins  -Next set of bariatric labs ordered for approximately 2 weeks  -Patient received education about the importance of adhering to a lifelong supplementation regimen to avoid vitamin/mineral deficiencies      Diagnoses and all orders for this visit:    Bariatric surgery status  -     Zinc; Future  -     Vitamin D 25 hydroxy; Future  -     Vitamin B12; Future  -     Vitamin B1, whole blood; Future  -     Vitamin A; Future  -     CBC and Platelet; Future  -     Comprehensive metabolic panel; Future  -     Folate; Future  -     Ferritin; Future  -     Copper Level; Future  -     Iron Saturation %; Future  -     PTH, intact; Future    Postsurgical malabsorption  -     Zinc; Future  -     Vitamin D 25 hydroxy;  Future  -     Vitamin B12; Future  -     Vitamin B1, whole blood; Future  -     Vitamin A; Future  -     CBC and Platelet; Future  -     Comprehensive metabolic panel; Future  -     Folate; Future  -     Ferritin; Future  -     Copper Level; Future  -     Iron Saturation %; Future  -     PTH, intact; Future    Obesity, Class II, BMI 35-39 9    Low back pain    Hypothyroid    Abdominal pain    Subjective:      Patient ID: Mitch Eugene is a 60 y o  female  -s/p Gia-En-Y Gastric Bypass with Dr Mary Tian in 2008  Overall doing Fair  Complains of weight regain over the last 3-5 years as a result of decreased physical activity due to back/leg pain  She has had multiple LE surgeries  Also with hx of cervical radiculopathy and multiple neck surgeries  Patient is currently in remission from non-hodgkin's lymphoma which was treated via chemotherapy  After chemo states she gained 30 lbs  Patient has history of ulcer after surgery  At the time had sxs of burning epigastric pain  Patient was started on Nexium and sxs improved  Over the past several months has developed intermittent epigastric pain again but with nausea this time  Sxs can be present with or without eating  She is taking Nexium 40 mg daily for sxs relief  Initial: 289 lbs  Current:211 5 lbs  Sudarshan: 170 lbs  Current BMI is Body mass index is 37 47 kg/m²      · Tolerating a regular diet-yes  · Eating at least 60 grams of protein per day-yes  · Following 30/60 minute rule with liquids-occasionally   · Drinking at least 64 ounces of fluid per day-yes  · Drinking carbonated beverages-very rarely  · Sufficient exercise-no  · Using NSAIDs regularly-no  · Using nicotine-no  · Using alcohol-yes; drinks 1-2 glasses of wine daily   · Supplements: calcium + vit D + iron + OTC multivitamin + vit C  ·     The following portions of the patient's history were reviewed and updated as appropriate: allergies, current medications, past family history, past medical history, past social history, past surgical history and problem list     Review of Systems   Constitutional: Negative  HENT: Negative  Respiratory: Positive for shortness of breath (on exertion since regained weight )  Negative for cough  Cardiovascular: Negative  Gastrointestinal: Positive for abdominal pain (epigastric ) and nausea  Musculoskeletal: Positive for arthralgias and back pain  Skin: Negative  Neurological: Positive for numbness (left foot - had 6 surgeries on LLE and has had sxs since then )  Negative for dizziness, weakness, light-headedness and headaches  Psychiatric/Behavioral:        Denies concerns with mood          Objective:    /70 (BP Location: Left arm, Patient Position: Sitting)   Pulse 78   Temp (!) 97 4 °F (36 3 °C) (Tympanic)   Ht 5' 3" (1 6 m)   Wt 95 9 kg (211 lb 8 oz)   BMI 37 47 kg/m²      Physical Exam   Constitutional: She is oriented to person, place, and time  She appears well-developed and well-nourished  HENT:   Head: Normocephalic and atraumatic  Eyes: Conjunctivae and EOM are normal  No scleral icterus  Neck: Normal range of motion  Cardiovascular: Normal rate and regular rhythm  Pulmonary/Chest: Effort normal and breath sounds normal  No respiratory distress  She has no wheezes  Abdominal: Soft  Bowel sounds are normal  She exhibits no distension  There is no tenderness  Musculoskeletal: Normal range of motion  Neurological: She is alert and oriented to person, place, and time  Skin: Skin is warm  Psychiatric: She has a normal mood and affect  Nursing note and vitals reviewed

## 2020-02-04 NOTE — PATIENT INSTRUCTIONS
· Continued/Maintain healthy weight loss with good nutrition intakes  · Adequate hydration with at least 64oz  fluid intake  · Follow diet as discussed  · Follow vitamin and mineral recommendations as reviewed with you  · Exercise as tolerated

## 2020-02-04 NOTE — PROGRESS NOTES
Assessment/Plan:  Patient seen at [facility] office      Patient ID: Raheel Friedman is a 60 y o  female  Bariatric Surgery Status    -s/p Gia-En-Y Gastric Bypass with Dr Cole in 2008  Presents to the office today for overdue annual follow up with complaint of epigastric pain and nausea as well as weight regain  · Continued/Maintain healthy weight loss with good nutrition intakes  · Adequate hydration with at least 64oz  fluid intake  · Follow diet as discussed  · Follow vitamin and mineral recommendations as reviewed with you  · Exercise as tolerated  · Colonoscopy referral made: no    · Follow-up after EGD  We kindly ask that your arrive 15 minutes before your scheduled appointment time with your provider to allow our staff to room you, get your vital signs and update your chart  · Get lab work done  Please call the office if you need a script  It is recommended to check with your insurance BEFORE getting labs done to make sure they are covered by your policy  · Call our office if you have any problems with abdominal pain especially associated with fever, chills, nausea, vomiting or any other concerns  · All  Post-bariatric surgery patients should be aware that very small quantities of any alcohol can cause impairment and it is very possible not to feel the effect  The effect can be in the system for several hours  It is also a stomach irritant  · It is advised to AVOID alcohol, Nonsteroidal antiinflammatory drugs (NSAIDS) and nicotine of all forms   Any of these can cause stomach irritation/pain  · Discussed the effects of alcohol on a bariatric patient and the increased impairment risk  · Keep up the good work! Abdominal pain  - sxs of epigastric pain and nausea over the past several months  Sxs can develop with or without PO intake   - currently taking Nexium 40 mg daily   Will continue   - EGD with Dr Ede Andino and follow up thereafter    Obesity, Class II, BMI 35-39 9  - complains of weight regain over the past 3-5 years due to back pain which limits her physical activity   - currently on multiple medications that can contribute to weight gain   - EGD to evaluate bypass anatomy   - discussed options with patient including MWM if bypass is unremarkable and she does not qualify for revision   - encourage exercise as tolerated, 30/60 min rule, appropriate diet and lifestyle changes as discussed  - bariatric manual provided to help pt return back to basics    Low back pain  - back injury over 30 years ago with complications including severe lower back pain and lumbar radiculopathy, worsening over the past several years  - pt sts possible lower back surgery in April but would like to lose weight prior to ease the healing process  Consider MWM  Hypothyroid  - continue Synthroid     Postsurgical Malabsorption   -At risk for malabsorption of vitamins/minerals secondary to malabsorption and restriction of intake from bariatric surgery  Currently taking adequate postop bariatric surgery vitamin supplementation  - list of recommendations provided for both regular and bariatric vitamins- should be taking 2 multivitamins if chooses regular OTC vitamins  -Next set of bariatric labs ordered for approximately 2 weeks  -Patient received education about the importance of adhering to a lifelong supplementation regimen to avoid vitamin/mineral deficiencies      Diagnoses and all orders for this visit:    Bariatric surgery status  -     Zinc; Future  -     Vitamin D 25 hydroxy; Future  -     Vitamin B12; Future  -     Vitamin B1, whole blood; Future  -     Vitamin A; Future  -     CBC and Platelet; Future  -     Comprehensive metabolic panel; Future  -     Folate; Future  -     Ferritin; Future  -     Copper Level; Future  -     Iron Saturation %; Future  -     PTH, intact; Future    Postsurgical malabsorption  -     Zinc; Future  -     Vitamin D 25 hydroxy;  Future  -     Vitamin B12; Future  -     Vitamin B1, whole blood; Future  -     Vitamin A; Future  -     CBC and Platelet; Future  -     Comprehensive metabolic panel; Future  -     Folate; Future  -     Ferritin; Future  -     Copper Level; Future  -     Iron Saturation %; Future  -     PTH, intact; Future    Obesity, Class II, BMI 35-39 9    Low back pain    Hypothyroid    Abdominal pain    Subjective:      Patient ID: Cali Ordonez is a 60 y o  female  -s/p Gia-En-Y Gastric Bypass with Dr Lea Sullivan in 2008  Overall doing Fair  Complains of weight regain over the last 3-5 years as a result of decreased physical activity due to back/leg pain  She has had multiple LE surgeries  Also with hx of cervical radiculopathy and multiple neck surgeries  Patient is currently in remission from non-hodgkin's lymphoma which was treated via chemotherapy  After chemo states she gained 30 lbs  Patient has history of ulcer after surgery  At the time had sxs of burning epigastric pain  Patient was started on Nexium and sxs improved  Over the past several months has developed intermittent epigastric pain again but with nausea this time  Sxs can be present with or without eating  She is taking Nexium 40 mg daily for sxs relief  Initial: 289 lbs  Current:211 5 lbs  Sudarshan: 170 lbs  Current BMI is Body mass index is 37 47 kg/m²      · Tolerating a regular diet-yes  · Eating at least 60 grams of protein per day-yes  · Following 30/60 minute rule with liquids-occasionally   · Drinking at least 64 ounces of fluid per day-yes  · Drinking carbonated beverages-very rarely  · Sufficient exercise-no  · Using NSAIDs regularly-no  · Using nicotine-no  · Using alcohol-yes; drinks 1-2 glasses of wine daily   · Supplements: calcium + vit D + iron + OTC multivitamin + vit C  ·     The following portions of the patient's history were reviewed and updated as appropriate: allergies, current medications, past family history, past medical history, past social history, past surgical history and problem list     Review of Systems   Constitutional: Negative  HENT: Negative  Respiratory: Positive for shortness of breath (on exertion since regained weight )  Negative for cough  Cardiovascular: Negative  Gastrointestinal: Positive for abdominal pain (epigastric ) and nausea  Musculoskeletal: Positive for arthralgias and back pain  Skin: Negative  Neurological: Positive for numbness (left foot - had 6 surgeries on LLE and has had sxs since then )  Negative for dizziness, weakness, light-headedness and headaches  Psychiatric/Behavioral:        Denies concerns with mood          Objective:    /70 (BP Location: Left arm, Patient Position: Sitting)   Pulse 78   Temp (!) 97 4 °F (36 3 °C) (Tympanic)   Ht 5' 3" (1 6 m)   Wt 95 9 kg (211 lb 8 oz)   BMI 37 47 kg/m²      Physical Exam   Constitutional: She is oriented to person, place, and time  She appears well-developed and well-nourished  HENT:   Head: Normocephalic and atraumatic  Eyes: Conjunctivae and EOM are normal  No scleral icterus  Neck: Normal range of motion  Cardiovascular: Normal rate and regular rhythm  Pulmonary/Chest: Effort normal and breath sounds normal  No respiratory distress  She has no wheezes  Abdominal: Soft  Bowel sounds are normal  She exhibits no distension  There is no tenderness  Musculoskeletal: Normal range of motion  Neurological: She is alert and oriented to person, place, and time  Skin: Skin is warm  Psychiatric: She has a normal mood and affect  Nursing note and vitals reviewed

## 2020-03-03 ENCOUNTER — ANESTHESIA EVENT (OUTPATIENT)
Dept: GASTROENTEROLOGY | Facility: HOSPITAL | Age: 65
End: 2020-03-03

## 2020-03-04 ENCOUNTER — ANESTHESIA (OUTPATIENT)
Dept: GASTROENTEROLOGY | Facility: HOSPITAL | Age: 65
End: 2020-03-04

## 2020-03-04 ENCOUNTER — HOSPITAL ENCOUNTER (OUTPATIENT)
Dept: GASTROENTEROLOGY | Facility: HOSPITAL | Age: 65
Setting detail: OUTPATIENT SURGERY
Discharge: HOME | End: 2020-03-04
Attending: SURGERY | Admitting: SURGERY
Payer: COMMERCIAL

## 2020-03-04 VITALS
SYSTOLIC BLOOD PRESSURE: 116 MMHG | WEIGHT: 215 LBS | DIASTOLIC BLOOD PRESSURE: 50 MMHG | HEIGHT: 63 IN | BODY MASS INDEX: 38.09 KG/M2 | RESPIRATION RATE: 16 BRPM | OXYGEN SATURATION: 94 % | HEART RATE: 88 BPM | TEMPERATURE: 97.6 F

## 2020-03-04 DIAGNOSIS — Z98.84 STATUS POST BARIATRIC SURGERY: ICD-10-CM

## 2020-03-04 PROCEDURE — 88305 TISSUE EXAM BY PATHOLOGIST: CPT | Performed by: PATHOLOGY

## 2020-03-04 PROCEDURE — 43239 EGD BIOPSY SINGLE/MULTIPLE: CPT | Performed by: SURGERY

## 2020-03-04 RX ORDER — PROPOFOL 10 MG/ML
INJECTION, EMULSION INTRAVENOUS AS NEEDED
Status: DISCONTINUED | OUTPATIENT
Start: 2020-03-04 | End: 2020-03-04 | Stop reason: SURG

## 2020-03-04 RX ORDER — SODIUM CHLORIDE 9 MG/ML
125 INJECTION, SOLUTION INTRAVENOUS CONTINUOUS
Status: DISCONTINUED | OUTPATIENT
Start: 2020-03-04 | End: 2020-03-08 | Stop reason: HOSPADM

## 2020-03-04 RX ADMIN — SODIUM CHLORIDE 125 ML/HR: 0.9 INJECTION, SOLUTION INTRAVENOUS at 11:05

## 2020-03-04 RX ADMIN — PROPOFOL 200 MG: 10 INJECTION, EMULSION INTRAVENOUS at 11:20

## 2020-03-04 NOTE — ANESTHESIA PREPROCEDURE EVALUATION
Review of Systems/Medical History  Patient summary reviewed  Chart reviewed  No history of anesthetic complications     Cardiovascular   Pulmonary  Smoker ex-smoker  ,        GI/Hepatic    GERD well controlled, Bariatric surgery ([facility]-Dr Hayes Chavez),             Endo/Other  History of thyroid disease , hypothyroidism,   Obesity    GYN  Negative gynecology ROS          Hematology  Anemia ,  Lymphoma ([facility])   Musculoskeletal  Back pain , cervical pain and lumbar pain,   Arthritis     Neurology  Negative neurology ROS      Psychology   Anxiety,              Physical Exam    Airway    Mallampati score: II  TM Distance: >3 FB  Neck ROM: full     Dental   No notable dental hx     Cardiovascular  Rhythm: regular, Rate: normal, Cardiovascular exam normal    Pulmonary  Pulmonary exam normal Breath sounds clear to auscultation,     Other Findings        Anesthesia Plan  ASA Score- 2     Anesthesia Type- IV sedation with anesthesia with ASA Monitors  Additional Monitors:   Airway Plan:         Plan Factors-Patient not instructed to abstain from smoking on day of procedure  Patient did not smoke on day of surgery  Induction- intravenous  Postoperative Plan-     Informed Consent- Anesthetic plan and risks discussed with patient  I personally reviewed this patient with the CRNA  Discussed and agreed on the Anesthesia Plan with the CRNA  Stephanie Perry

## 2020-03-04 NOTE — DISCHARGE INSTRUCTIONS
Gastritis   WHAT YOU NEED TO KNOW:   Gastritis is inflammation or irritation of the lining of your stomach  DISCHARGE INSTRUCTIONS:   Call 911 for any of the following:   · You develop chest pain or shortness of breath  Seek care immediately if:   · You vomit blood  · You have black or bloody bowel movements  · You have severe stomach or back pain  Contact your healthcare provider if:   · You have a fever  · You have new or worsening symptoms, even after treatment  · You have questions or concerns about your condition or care  Medicines:   · Medicines  may be given to help treat a bacterial infection or decrease stomach acid  · Take your medicine as directed  Contact your healthcare provider if you think your medicine is not helping or if you have side effects  Tell him or her if you are allergic to any medicine  Keep a list of the medicines, vitamins, and herbs you take  Include the amounts, and when and why you take them  Bring the list or the pill bottles to follow-up visits  Carry your medicine list with you in case of an emergency  Manage or prevent gastritis:   · Do not smoke  Nicotine and other chemicals in cigarettes and cigars can make your symptoms worse and cause lung damage  Ask your healthcare provider for information if you currently smoke and need help to quit  E-cigarettes or smokeless tobacco still contain nicotine  Talk to your healthcare provider before you use these products  · Do not drink alcohol  Alcohol can prevent healing and make your gastritis worse  Talk to your healthcare provider if you need help to stop drinking  · Do not take NSAIDs or aspirin unless directed  These and similar medicines can cause irritation  If your healthcare provider says it is okay to take NSAIDs, take them with food  · Do not eat foods that cause irritation  Foods such as oranges and salsa can cause burning or pain  Eat a variety of healthy foods   Examples include fruits (not citrus), vegetables, low-fat dairy products, beans, whole-grain breads, and lean meats and fish  Try to eat small meals, and drink water with your meals  Do not eat for at least 3 hours before you go to bed  · Find ways to relax and decrease stress  Stress can increase stomach acid and make gastritis worse  Activities such as yoga, meditation, or listening to music can help you relax  Spend time with friends, or do things you enjoy  Follow up with your healthcare provider as directed: You may need ongoing tests or treatment, or referral to a gastroenterologist  Write down your questions so you remember to ask them during your visits  © 2017 [employer] Information is for End User's use only and may not be sold, redistributed or otherwise used for commercial purposes  All illustrations and images included in CareNotes® are the copyrighted property of [employer]  or [employer]  The above information is an  only  It is not intended as medical advice for individual conditions or treatments  Talk to your doctor, nurse or pharmacist before following any medical regimen to see if it is safe and effective for you

## 2020-03-04 NOTE — INTERVAL H&P NOTE
H&P reviewed  After examining the patient I find no changes in the patients condition since the H&P had been written      Vitals:    03/04/20 1051   BP: 124/73   Pulse: 78   Resp: 18   Temp: 97 6 °F (36 4 °C)   SpO2: 95%

## 2020-03-12 ENCOUNTER — OFFICE VISIT (OUTPATIENT)
Dept: BARIATRICS | Facility: CLINIC | Age: 65
End: 2020-03-12
Payer: COMMERCIAL

## 2020-03-12 ENCOUNTER — TELEPHONE (OUTPATIENT)
Dept: BARIATRICS | Facility: CLINIC | Age: 65
End: 2020-03-12

## 2020-03-12 VITALS
TEMPERATURE: 97.1 F | WEIGHT: 213 LBS | DIASTOLIC BLOOD PRESSURE: 72 MMHG | BODY MASS INDEX: 37.74 KG/M2 | HEART RATE: 90 BPM | HEIGHT: 63 IN | SYSTOLIC BLOOD PRESSURE: 126 MMHG

## 2020-03-12 DIAGNOSIS — R79.89 HIGH SERUM FERRITIN: ICD-10-CM

## 2020-03-12 DIAGNOSIS — K91.2 POSTSURGICAL MALABSORPTION: ICD-10-CM

## 2020-03-12 DIAGNOSIS — K21.9 GASTROESOPHAGEAL REFLUX DISEASE WITHOUT ESOPHAGITIS: Primary | ICD-10-CM

## 2020-03-12 DIAGNOSIS — Z98.84 BARIATRIC SURGERY STATUS: Primary | ICD-10-CM

## 2020-03-12 DIAGNOSIS — R79.89 HIGH SERUM PARATHYROID HORMONE (PTH): ICD-10-CM

## 2020-03-12 PROCEDURE — 99204 OFFICE O/P NEW MOD 45 MIN: CPT | Performed by: SURGERY

## 2020-03-12 RX ORDER — SUCRALFATE 1 G/1
1 TABLET ORAL 4 TIMES DAILY
Qty: 120 TABLET | Refills: 2 | Status: SHIPPED | OUTPATIENT
Start: 2020-03-12 | End: 2021-06-10

## 2020-03-12 RX ORDER — ESOMEPRAZOLE MAGNESIUM 40 MG/1
40 CAPSULE, DELAYED RELEASE ORAL DAILY
Qty: 90 CAPSULE | Refills: 0 | Status: SHIPPED | OUTPATIENT
Start: 2020-03-12 | End: 2020-10-15

## 2020-03-12 NOTE — PROGRESS NOTES
OFFICE VISIT - BARIATRIC SURGERY  Andrew Skinner 63 y o  female MRN: [id number]  Unit/Bed#:  Encounter: [id number]      HPI:  Andrew Skinner is a 63 y o  female status post lap RYGB by Dr Alvino Cook in 3/2008  Comes to the office today to review a recent endoscopy  Subjective     At the time of her surgery the patient was 289, and was able to drop down as low as 170  Today, the weight is 213 with a BMI of 37 7  Duration of symptoms: 8y   Main symptoms: Heartburn, reflux, chest pain, weight regain  Current treatment: Nexium 40mg qday, carafate PRN  Review of Systems   Constitutional: Negative  HENT: Negative  Eyes: Negative  Respiratory: Positive for chest tightness  Cardiovascular: Negative  Gastrointestinal: Positive for abdominal pain and nausea  Endocrine: Negative  Genitourinary: Negative  Musculoskeletal: Negative  Skin: Negative  Allergic/Immunologic: Negative  Neurological: Negative  Hematological: Negative  Psychiatric/Behavioral: Negative          Historical Information   Past Medical History:   Diagnosis Date    Anemia     Cancer ([facility] [address]     Degenerative joint disease     Disease of thyroid gland     underactive    Lumbar disc disease      Past Surgical History:   Procedure Laterality Date    ANKLE SURGERY      [id number],[id number]    BACK SURGERY  10/1996    BREAST BIOPSY Left 06/2012    BUNIONECTOMY  08/18/2015    CARPAL TUNNEL RELEASE Left 03/1995    CARPAL TUNNEL RELEASE Right 12/1999    DISCECTOMY SPINE CERVICAL ANTERIOR W/ ARTHROPLASTY      C3/C4    FOOT GANGLION EXCISION Left 10/27/2017    FOOT SURGERY  08/09/2011    ankle/foot sx    HEMORRHOID SURGERY  2009    KNEE CARTILAGE SURGERY Right     ROTATOR CUFF REPAIR Left 05/14/2008    AIME-EN-Y PROCEDURE  03/31/2008    TOTAL SHOULDER REPLACEMENT  06/24/2014     Social History   Social History     Substance and Sexual Activity   Alcohol Use Yes    Comment: liter nightly Social History     Substance and Sexual Activity   Drug Use Never     Social History     Tobacco Use   Smoking Status Former Smoker   Smokeless Tobacco Never Used       Objective       Current Vitals:   Blood Pressure: 126/72 (03/12/20 1055)  Pulse: 90 (03/12/20 1055)  Temperature: (!) 97 1 °F (36 2 °C) (03/12/20 1055)  Temp Source: Tympanic (03/12/20 1055)  Height: 5' 3" (160 cm) (03/12/20 1055)  Weight - Scale: 96 6 kg (213 lb) (03/12/20 1055)    Invasive Devices     None                 Physical Exam   Constitutional: She is oriented to person, place, and time  She appears well-developed and well-nourished  HENT:   Head: Normocephalic and atraumatic  Nose: Nose normal    Eyes: Conjunctivae and EOM are normal    Neck: Normal range of motion  Cardiovascular: Normal rate  Pulmonary/Chest: Effort normal    Abdominal: Soft  Bowel sounds are normal  She exhibits no distension and no mass  There is no tenderness  There is no rebound and no guarding  No hernia  All laparoscopic incisions have completely healed  Musculoskeletal: Normal range of motion  Neurological: She is alert and oriented to person, place, and time  Skin: Skin is warm  Psychiatric: She has a normal mood and affect  Her behavior is normal  Judgment and thought content normal          Pathology, and Other Studies: I have personally reviewed pertinent reports  and I have personally reviewed pertinent films in PACS      Assessment/PLAN:    Kelly Diamond is a 63 y o  female status post lap RYGB by Dr Praneeth Isabel in 3/2008  Comes to the office today to review a recent endoscopy   ---------------------------------------------------    The patient's workup thus far was reviewed, and includes:    UGI (10/2018)    IMPRESSION:     Gia-en-Y gastric bypass procedure, the study is otherwise unremarkable  EGD (3/4/2020)     FINDINGS:  · Normal  · Edematous and erythematous mucosa in the stomach   Inflammation of the gastric pouch  · Signs of previous gastric bypass surgery    IMPRESSION:  Inflammation of the gastric pouch  No evidence of marginal ulceration  No evidence of hiatal hernia   Status post laparoscopic Gia-en-Y gastric bypass with small pouch    Pathology    Final Diagnosis   A  Stomach, pouch biopsy:  - Mild chronic inactive gastritis with reactive changes  - Negative for intestinal metaplasia, dysplasia or carcinoma  - No Helicobacter pylori is identified on H&E stained slides  --------------------------------------------------------------------  We will order a manometry and pH study to complete the patient's workup, off medication  Follow up with results of the study        Elissa Rodney MD  Bariatric Surgery Fellow  3/12/2020  11:11 AM

## 2020-07-13 DIAGNOSIS — Z98.84 BARIATRIC SURGERY STATUS: ICD-10-CM

## 2020-07-13 PROCEDURE — U0003 INFECTIOUS AGENT DETECTION BY NUCLEIC ACID (DNA OR RNA); SEVERE ACUTE RESPIRATORY SYNDROME CORONAVIRUS 2 (SARS-COV-2) (CORONAVIRUS DISEASE [COVID-19]), AMPLIFIED PROBE TECHNIQUE, MAKING USE OF HIGH THROUGHPUT TECHNOLOGIES AS DESCRIBED BY CMS-2020-01-R: HCPCS

## 2020-07-14 LAB
INPATIENT: NORMAL
SARS-COV-2 RNA SPEC QL NAA+PROBE: NOT DETECTED

## 2020-07-22 ENCOUNTER — HOSPITAL ENCOUNTER (OUTPATIENT)
Dept: GASTROENTEROLOGY | Facility: HOSPITAL | Age: 65
Discharge: HOME | End: 2020-07-22
Payer: COMMERCIAL

## 2020-07-22 VITALS
HEART RATE: 95 BPM | RESPIRATION RATE: 18 BRPM | OXYGEN SATURATION: 98 % | TEMPERATURE: 98.5 F | SYSTOLIC BLOOD PRESSURE: 118 MMHG | DIASTOLIC BLOOD PRESSURE: 77 MMHG

## 2020-07-22 DIAGNOSIS — Z98.84 BARIATRIC SURGERY STATUS: Primary | ICD-10-CM

## 2020-07-22 DIAGNOSIS — K21.9 GASTROESOPHAGEAL REFLUX DISEASE WITHOUT ESOPHAGITIS: ICD-10-CM

## 2020-07-22 DIAGNOSIS — K91.2 POSTSURGICAL MALABSORPTION: ICD-10-CM

## 2020-07-22 PROCEDURE — 91038 ESOPH IMPED FUNCT TEST > 1HR: CPT

## 2020-07-22 PROCEDURE — 91020 GASTRIC MOTILITY STUDIES: CPT

## 2020-08-09 PROCEDURE — 91038 ESOPH IMPED FUNCT TEST > 1HR: CPT | Performed by: INTERNAL MEDICINE

## 2020-08-09 PROCEDURE — 91010 ESOPHAGUS MOTILITY STUDY: CPT | Performed by: INTERNAL MEDICINE

## 2020-08-17 ENCOUNTER — TRANSCRIBE ORDERS (OUTPATIENT)
Dept: ADMINISTRATIVE | Facility: HOSPITAL | Age: 65
End: 2020-08-17

## 2020-08-17 DIAGNOSIS — M54.17 LUMBOSACRAL RADICULITIS: Primary | ICD-10-CM

## 2020-08-18 ENCOUNTER — TELEPHONE (OUTPATIENT)
Dept: BARIATRICS | Facility: CLINIC | Age: 65
End: 2020-08-18

## 2020-08-19 NOTE — TELEPHONE ENCOUNTER
Pt calling to schedule follow up appointment, please call back on Friday per pt request  Pt having a procedure tomorrow

## 2020-08-20 LAB
CALCIUM SERPL-MCNC: 9.3 MG/DL (ref 8.6–10.4)
FERRITIN SERPL-MCNC: 316 NG/ML (ref 16–288)
IRON SATN MFR SERPL: 47 % (CALC) (ref 16–45)
IRON SERPL-MCNC: 137 MCG/DL (ref 45–160)
PTH-INTACT SERPL-MCNC: 56 PG/ML (ref 14–64)
TIBC SERPL-MCNC: 293 MCG/DL (CALC) (ref 250–450)

## 2020-08-21 ENCOUNTER — TELEPHONE (OUTPATIENT)
Dept: BARIATRICS | Facility: CLINIC | Age: 65
End: 2020-08-21

## 2020-08-21 NOTE — TELEPHONE ENCOUNTER
Called patient regarding recent blood work  Her ferritin is elevated at 316 and iron sat also elevated 47%  Patient states she follows every other month with heme/onc Dr Abdirizak Lawton in [place]  She was just seen by him on Monday of this week and then had her blood work done 2 days ago  She states they also did blood work on her in the office  I recommend that patient follow up with Dr Abdirizak Lawton regarding iron levels  I will send a message to Dr Abdirizak Lawton and also advised patient to call their office next week to follow up on this  She is agreeable and verbalizes understanding  In the meantime will cut back on PO iron (every other day or every 2 days) until she verifies plan with her heme/onc

## 2020-08-27 ENCOUNTER — OFFICE VISIT (OUTPATIENT)
Dept: BARIATRICS | Facility: CLINIC | Age: 65
End: 2020-08-27
Payer: COMMERCIAL

## 2020-08-27 VITALS
HEIGHT: 63 IN | SYSTOLIC BLOOD PRESSURE: 110 MMHG | WEIGHT: 209.5 LBS | HEART RATE: 100 BPM | BODY MASS INDEX: 37.12 KG/M2 | TEMPERATURE: 97.8 F | DIASTOLIC BLOOD PRESSURE: 58 MMHG

## 2020-08-27 DIAGNOSIS — K21.9 ACID REFLUX: Primary | ICD-10-CM

## 2020-08-27 PROCEDURE — 99214 OFFICE O/P EST MOD 30 MIN: CPT | Performed by: SURGERY

## 2020-08-27 RX ORDER — AMOXICILLIN 500 MG/1
CAPSULE ORAL
COMMUNITY
End: 2020-09-09

## 2020-08-27 RX ORDER — FEXOFENADINE HYDROCHLORIDE 60 MG/1
60 TABLET, FILM COATED ORAL DAILY
COMMUNITY
End: 2021-09-21 | Stop reason: HOSPADM

## 2020-08-27 RX ORDER — PENTOXIFYLLINE 400 MG/1
400 TABLET, EXTENDED RELEASE ORAL 2 TIMES DAILY WITH MEALS
COMMUNITY
Start: 2020-07-30 | End: 2021-09-21 | Stop reason: HOSPADM

## 2020-08-27 NOTE — PROGRESS NOTES
BARIATRIC HISTORY AND PHYSICAL - BARIATRIC SURGERY  Katelyn Castro 63 y o  female MRN: [id number]  Unit/Bed#:  Encounter: [id number]      HPI:  Katelyn Castro is a 63 y o  female status post lap RYGB by Dr Reilly Andres in 3/2008  Comes to the office today to review a recent endoscopy    ABG was unremarkable, EGD did show edematous and erythematous mucosa in the stomach, with pouchitis  Pathology report, did show chronic inactive gastritis with no evidence of metaplasia or dysplasia or H pylori  She has had esophageal manometry and 24 hour pH study on July 22nd and she is here to discuss the results of both studies  She has normal esophageal motility  However she has Hypersensitive esophagus  Of note she is going to have a back surgery in the next month  Subjective      At the time of her surgery the patient was 289, and was able to drop down as low as 170  Today, the weight is 209 with a BMI of 37 1      Duration of symptoms: 8y   Main symptoms: Heartburn, reflux, chest pain, weight regain  Current treatment: Nexium 40mg qday, carafate PRN  Review of Systems   Constitutional: Negative for chills and fatigue  HENT: Negative for ear pain  Eyes: Negative for pain  Respiratory: Negative for cough, shortness of breath and wheezing  Cardiovascular: Negative for chest pain  Gastrointestinal: Negative for abdominal distention and abdominal pain  Acid reflux   Endocrine: Negative for polydipsia  Genitourinary: Negative for flank pain  Musculoskeletal: Positive for arthralgias and back pain  Skin: Negative for pallor  Allergic/Immunologic: Negative for food allergies  Neurological: Negative for weakness and headaches  Hematological: Does not bruise/bleed easily  Psychiatric/Behavioral: Negative for confusion         Historical Information   Past Medical History:   Diagnosis Date    Anemia     Cancer ([facility] [address]     Degenerative joint disease     Disease of thyroid gland     underactive    Lumbar disc disease      Past Surgical History:   Procedure Laterality Date    ANKLE SURGERY      [id number]    BACK SURGERY  10/1996    BREAST BIOPSY Left 06/2012    BUNIONECTOMY  08/18/2015    CARPAL TUNNEL RELEASE Left 03/1995    CARPAL TUNNEL RELEASE Right 12/1999    DISCECTOMY SPINE CERVICAL ANTERIOR W/ ARTHROPLASTY      C3/C4    FOOT GANGLION EXCISION Left 10/27/2017    FOOT SURGERY  08/09/2011    ankle/foot sx    HEMORRHOID SURGERY  2009    KNEE CARTILAGE SURGERY Right     MOUTH SURGERY      ROTATOR CUFF REPAIR Left 05/14/2008    AIME-EN-Y PROCEDURE  03/31/2008    TOTAL SHOULDER REPLACEMENT  06/24/2014     Social History   Social History     Substance and Sexual Activity   Alcohol Use Yes    Comment: liter nightly     Social History     Substance and Sexual Activity   Drug Use Never     Social History     Tobacco Use   Smoking Status Former Smoker   Smokeless Tobacco Never Used     Family History: non-contributory    Meds/Allergies   all medications and allergies reviewed  Allergies   Allergen Reactions    Cefaclor Other (See Comments)    Ciprofloxacin     Sulfa Antibiotics GI Intolerance    Sulfamethoxazole-Trimethoprim Other (See Comments)    Talwin [Pentazocine] Dizziness    Vicodin [Hydrocodone-Acetaminophen] GI Intolerance    Ondansetron Rash       Objective     Current Vitals:   Blood Pressure: 110/58 (08/27/20 1444)  Pulse: 100 (08/27/20 1444)  Temperature: 97 8 °F (36 6 °C) (08/27/20 1444)  Height: 5' 3" (160 cm) (08/27/20 1444)  Weight - Scale: 95 kg (209 lb 8 oz) (08/27/20 1444)  bowel movement  [unfilled]    Invasive Devices     None                 Physical Exam  Constitutional:       Appearance: Normal appearance  HENT:      Head: Normocephalic  Mouth/Throat:      Mouth: Mucous membranes are moist    Eyes:      Conjunctiva/sclera: Conjunctivae normal       Pupils: Pupils are equal, round, and reactive to light     Neck: Musculoskeletal: Normal range of motion  Cardiovascular:      Rate and Rhythm: Normal rate and regular rhythm  Pulmonary:      Effort: Pulmonary effort is normal    Abdominal:      General: Abdomen is flat  There is no distension  Tenderness: There is no abdominal tenderness  Musculoskeletal: Normal range of motion  Skin:     General: Skin is warm  Capillary Refill: Capillary refill takes less than 2 seconds  Neurological:      General: No focal deficit present  Mental Status: She is alert  Psychiatric:         Mood and Affect: Mood normal          Lab Results: I have personally reviewed pertinent lab results  Imaging: I have personally reviewed pertinent reports  EKG, Pathology, and Other Studies: I have personally reviewed pertinent reports  Code Status: [unfilled]  Advance Directive and Living Will:      Power of :    POLST:      Assessment/Plan:  Stanley Field is a 63 y o  female status post lap RYGB by Dr Jerry Lazcano in 3/2008  Comes to the office today to review esophageal manometry and 24 hour pH study  ---------------------------------------------------     The patient's workup thus far was reviewed, and includes:   Esophageal manometry and 24hr ph study (7/22/2020)  Indication- GERD  History of Gia-en-Y gastric bypass     Esophageal manometry  Esophageal motility- 10/10 swallows demonstrated normal esophageal contractibility pattern with mean DCI of  mmHg  s cm  LES- median IRP is 17 mmHg  Impedance- 90% complete clearance of liquid bolus swallows     Rapid swallow index is less than 1     Bement classification- normal esophageal motility        24 hour pH study- off PPI therapy     Acid exposure time is 0%  DeMeester scores 0 8  13 episodes of acid reflux all in the upright position  4 episodes of acid and weakly acid reflux in the upright position and 9 episodes of weakly alkaline      Symptom correlation for nausea and burping significant with symptom index of 100% and symptom associated probability of 99%     Hypersensitive esophagus     UGI (10/2018)     IMPRESSION:     Gia-en-Y gastric bypass procedure, the study is otherwise unremarkable      EGD (3/4/2020)     FINDINGS:  · Normal  · Edematous and erythematous mucosa in the stomach  Inflammation of the gastric pouch  · Signs of previous gastric bypass surgery     IMPRESSION:  Inflammation of the gastric pouch  No evidence of marginal ulceration  No evidence of hiatal hernia   Status post laparoscopic Gia-en-Y gastric bypass with small pouch     Pathology     Final Diagnosis   A  Stomach, pouch biopsy:  - Mild chronic inactive gastritis with reactive changes  - Negative for intestinal metaplasia, dysplasia or carcinoma  - No Helicobacter pylori is identified on H&E stained slides     --------------------------------------------------------------------  We will refer her to Dr Raeann Gowers, to discuss the medical therapy of esophageal hyper sensitivity    Follow-up with 3 months with Marisa Mix MD  Bariatric Surgery Fellow  8/27/2020  3:58 PM

## 2020-09-02 ENCOUNTER — HOSPITAL ENCOUNTER (OUTPATIENT)
Dept: RADIOLOGY | Age: 65
Discharge: HOME | End: 2020-09-02
Payer: COMMERCIAL

## 2020-09-02 DIAGNOSIS — M54.17 LUMBOSACRAL RADICULITIS: ICD-10-CM

## 2020-09-02 PROCEDURE — G1004 CDSM NDSC: HCPCS

## 2020-09-02 PROCEDURE — 72148 MRI LUMBAR SPINE W/O DYE: CPT

## 2020-09-09 ENCOUNTER — OFFICE VISIT (OUTPATIENT)
Dept: GASTROENTEROLOGY | Facility: CLINIC | Age: 65
End: 2020-09-09
Payer: COMMERCIAL

## 2020-09-09 VITALS
HEART RATE: 90 BPM | HEIGHT: 63 IN | SYSTOLIC BLOOD PRESSURE: 140 MMHG | WEIGHT: 211.8 LBS | DIASTOLIC BLOOD PRESSURE: 80 MMHG | BODY MASS INDEX: 37.53 KG/M2 | TEMPERATURE: 99.4 F

## 2020-09-09 DIAGNOSIS — Z12.11 SCREENING FOR COLON CANCER: ICD-10-CM

## 2020-09-09 DIAGNOSIS — R07.89 ATYPICAL CHEST PAIN: ICD-10-CM

## 2020-09-09 DIAGNOSIS — K21.9 GASTROESOPHAGEAL REFLUX DISEASE WITHOUT ESOPHAGITIS: Primary | ICD-10-CM

## 2020-09-09 DIAGNOSIS — K59.03 DRUG-INDUCED CONSTIPATION: ICD-10-CM

## 2020-09-09 PROCEDURE — 99204 OFFICE O/P NEW MOD 45 MIN: CPT | Performed by: INTERNAL MEDICINE

## 2020-09-09 RX ORDER — CALCIUM CARBONATE 200(500)MG
1 TABLET,CHEWABLE ORAL DAILY
Qty: 90 TABLET | Refills: 3 | Status: SHIPPED | OUTPATIENT
Start: 2020-09-09 | End: 2020-09-09

## 2020-09-09 RX ORDER — ALUMINUM HYDROXIDE AND MAGNESIUM CARBONATE 160; 105 MG/1; MG/1
2 TABLET, CHEWABLE ORAL DAILY
Qty: 180 TABLET | Refills: 5 | Status: SHIPPED | OUTPATIENT
Start: 2020-09-09 | End: 2020-10-15

## 2020-09-09 NOTE — PROGRESS NOTES
[facility] - Outpatient Consultation  Kory Hickey 63 y o  female MRN: [id number]  Encounter: [id number]          ASSESSMENT AND PLAN:      1  Gastroesophageal reflux disease without esophagitis  2  Atypical chest pain  -we discussed Gaviscon and Carafate as needed  -we discuss 24 hour pH study which shows 13 episode of reflux mostly non acid reflux   -reviewed manometry study was within normal limits  -due to history of constipation with avoid amitriptyline    3  Screening for colon cancer- she will follow with [facility] for screening for colorectal cancer  4  Constipation- currently under well control with stool softeners and p r n  Linzess  Constipation secondary to fentanyl/narcotic use  Symptoms are well controlled at this time  Follow-up as needed    She is 61-year-old female with previous history of gastric bypass surgery presents here for evaluation for acid reflux disease, further evaluation discuss regarding hypersensitive esophagus  She recently had 24 hour pH study, manometry study, EGD evaluation which were all within normal limits  She is here to discuss further management of atypical chest pain  She also has narcotic induced constipation  She is currently having 1-2 episodes of chest discomfort associated oral intake of food every 2 weeks  Symptoms are well managed with Carafate  She has atypical discomfort and recent manometry study and 24 hour pH study showed both to be within normal limits  She does have history of gastric bypass surgery and likely has alkaline reflux secondary to this  Alkaline reflux episodes are minimal but low symptom correlation  Unfortunately she did not have chest pain at the time of manometry 24 hour pH study  She was mostly concerned with her symptoms as she was worried there were cardiac in origin  Since symptoms improved with Carafate she is more at ease  Her symptoms are not very debilitating      She does have history of narcotic induced constipation with symptoms are well controlled with stool softeners and Senokot  She is up-to-date on screening colonoscopy will follow up with [facility] for further colonoscopy evaluation  REVIEW OF SYSTEMS:    CONSTITUTIONAL: Denies any fever, chills, rigors, and weight loss  HEENT: No earache or tinnitus  Denies hearing loss or visual disturbances  CARDIOVASCULAR: No chest pain or palpitations  RESPIRATORY: Denies any cough, hemoptysis, shortness of breath or dyspnea on exertion  GASTROINTESTINAL: As noted in the History of Present Illness  GENITOURINARY: No problems with urination  Denies any hematuria or dysuria  NEUROLOGIC: No dizziness or vertigo, denies headaches  MUSCULOSKELETAL: Denies any muscle or joint pain  SKIN: Denies skin rashes or itching  ENDOCRINE: Denies excessive thirst  Denies intolerance to heat or cold  PSYCHOSOCIAL: Denies depression or anxiety  Denies any recent memory loss         Historical Information   Past Medical History:   Diagnosis Date    Anemia     Cancer ([facility] [address]     Degenerative joint disease     Disease of thyroid gland     underactive    Lumbar disc disease      Past Surgical History:   Procedure Laterality Date    ANKLE SURGERY      [id number]    BACK SURGERY  10/1996    BREAST BIOPSY Left 06/2012    BUNIONECTOMY  08/18/2015    CARPAL TUNNEL RELEASE Left 03/1995    CARPAL TUNNEL RELEASE Right 12/1999    COLONOSCOPY      DISCECTOMY SPINE CERVICAL ANTERIOR W/ ARTHROPLASTY      C3/C4    FOOT GANGLION EXCISION Left 10/27/2017    FOOT SURGERY  08/09/2011    ankle/foot sx    HEMORRHOID SURGERY  2009    KNEE CARTILAGE SURGERY Right     MOUTH SURGERY      ROTATOR CUFF REPAIR Left 05/14/2008    AIME-EN-Y PROCEDURE  03/31/2008    TOTAL SHOULDER REPLACEMENT  06/24/2014    UPPER GASTROINTESTINAL ENDOSCOPY       Social History   Social History     Substance and Sexual Activity   Alcohol Use Yes    Comment: liter nightly     Social History     Substance and Sexual Activity   Drug Use Never     Social History     Tobacco Use   Smoking Status Former Smoker   Smokeless Tobacco Never Used     Family History   Problem Relation Age of Onset    Diabetes Mother     Hypertension Mother     Stroke Mother     Stroke Father     Leukemia Father        Meds/Allergies       Current Outpatient Medications:     Aloe Vera 25 MG CAPS    ALPRAZolam (XANAX) 0 25 mg tablet    ascorbic acid (VITAMIN C) 250 MG tablet    bepotastine besilate (BEPREVE) 1 5 % op soln    Calcium 150 MG TABS    Calcium Carb-Ergocalciferol 250-125 MG-UNIT TABS    Cholecalciferol 100 MCG (4000 UT) CAPS    cyclobenzaprine (FLEXERIL) 10 mg tablet    denosumab (PROLIA) 60 mg/mL    docusate sodium (COLACE) 100 mg capsule    esomeprazole (NEXIUM) 40 MG capsule    esomeprazole (NexIUM) 40 MG capsule    fentaNYL (DURAGESIC) 50 mcg/hr    fexofenadine (ALLEGRA) 60 MG tablet    gabapentin (NEURONTIN) 300 mg capsule    hydrochlorothiazide (MICROZIDE) 12 5 mg capsule    linaCLOtide 145 MCG CAPS    lisinopril-hydrochlorothiazide (PRINZIDE,ZESTORETIC) 10-12 5 MG per tablet    Multiple Vitamins-Minerals (ONE DAILY WOMENS 50 PLUS PO)    Omega 3-6-9 Fatty Acids (OMEGA 3-6-9 COMPLEX) CAPS    oxyCODONE-acetaminophen (PERCOCET) 7 5-325 MG per tablet    Polyethylene Glycol 3350 (MIRALAX PO)    Potassium (POTASSIMIN) 75 MG TABS    prochlorperazine (COMPAZINE) 10 mg tablet    sucralfate (CARAFATE) 1 g tablet    traZODone (DESYREL) 100 mg tablet    calcium carbonate (TUMS) 500 mg chewable tablet    levothyroxine 50 mcg tablet    pentoxifylline (TRENtal) 400 mg ER tablet    Allergies   Allergen Reactions    Cefaclor Other (See Comments)    Ciprofloxacin     Sulfa Antibiotics GI Intolerance    Sulfamethoxazole-Trimethoprim Other (See Comments)    Talwin [Pentazocine] Dizziness    Vicodin [Hydrocodone-Acetaminophen] GI Intolerance    Ondansetron Rash Objective     Blood pressure 140/80, pulse 90, temperature 99 4 °F (37 4 °C), temperature source Tympanic, height 5' 3" (1 6 m), weight 96 1 kg (211 lb 12 8 oz)  Body mass index is 37 52 kg/m²  PHYSICAL EXAM:      General Appearance:   Alert, cooperative, no distress   HEENT:   Normocephalic, atraumatic, anicteric      Neck:  Supple, symmetrical, trachea midline   Lungs:   Clear to auscultation bilaterally; no rales, rhonchi or wheezing; respirations unlabored    Heart[de-identified]   Regular rate and rhythm; no murmur, rub, or gallop  Abdomen:   Soft, non-tender, non-distended; normal bowel sounds; no masses, no organomegaly    Genitalia:   Deferred    Rectal:   Deferred    Extremities:  No cyanosis, clubbing or edema    Pulses:  2+ and symmetric    Skin:  No jaundice, rashes, or lesions    Lymph nodes:  No palpable cervical lymphadenopathy        Lab Results:   No visits with results within 1 Day(s) from this visit  Latest known visit with results is:   Orders Only on 08/19/2020   Component Date Value    PTH, Intact 08/19/2020 56     Calcium 08/19/2020 9 3     Iron, Serum 08/19/2020 137     Total Iron Binding [place]* 08/19/2020 293     Iron Saturation 08/19/2020 47*    Ferritin 08/19/2020 316*         Radiology Results:   Mri Lumbar Spine Wo Contrast    Result Date: 9/3/2020  Narrative: MRI LUMBAR SPINE WITHOUT CONTRAST INDICATION: M54 17: Radiculopathy, lumbosacral region  Lumbosacral radiculitis  COMPARISON:  Prior MRI October 31, 2019  TECHNIQUE:  Sagittal T1, sagittal T2, sagittal inversion recovery, axial T1 and axial T2, coronal T2   IMAGE QUALITY:  Diagnostic FINDINGS: VERTEBRAL BODIES:  There are 5 lumbar type vertebral bodies  Levoscoliosis apex L3  Endplate degenerative marrow signal identified particularly T11-12 and L4-5  SACRUM:  Normal signal within the sacrum  No evidence of insufficiency or stress fracture   DISTAL CORD AND CONUS:  Normal size and signal within the distal cord and conus  PARASPINAL SOFT TISSUES:  Paraspinal soft tissues are unremarkable  LOWER THORACIC DISC SPACES:  T12-L1: Is a degenerative disc osteophyte complex with marginal osteophyte eccentric to the left resulting in mild left lateral recess stenosis  Degenerative changes are also noted T10-11 and T11-12 although axial images were not obtained to these levels  LUMBAR DISC SPACES: L1-L2:  Degenerative disc osteophyte complex with marginal osteophytes result in moderate right greater than left foraminal narrowing with severe right lateral recess stenosis  Mild central stenosis  L2-L3:  Evidence of decompressive laminectomy  Correlate with clinical history  Degenerative disc osteophyte complex with marginal osteophytes eccentric to the right  There is mild right lateral recess and mild bilateral foraminal narrowing  L3-L4:  Degenerative disc osteophyte complex with marginal osteophytes result in moderate right and mild left foraminal narrowing  Moderate central and moderate bilateral lateral recess stenosis  L4-L5:  Degenerative disc osteophyte complex with marginal osteophytes bilaterally results in severe bilateral foraminal narrowing  Moderate central and bilateral lateral recess stenosis  L5-S1:  Severe left and moderate right facet hypertrophy with marginal osteophytes bilaterally result in severe left foraminal narrowing  There is mild left lateral recess stenosis  Impression: Levoscoliosis apex L2-3 similar to prior study  Spondylotic degenerative changes are noted throughout the lumbar spine not significantly changed from the prior study  There is multilevel moderate central and foraminal narrowing  Severe bilateral foraminal narrowing noted at L4-5 and severe left foraminal narrowing at L5-S1   Workstation performed: [id number]

## 2020-09-10 ENCOUNTER — TRANSCRIBE ORDERS (OUTPATIENT)
Dept: GASTROENTEROLOGY | Facility: CLINIC | Age: 65
End: 2020-09-10

## 2020-10-15 ENCOUNTER — APPOINTMENT (EMERGENCY)
Dept: RADIOLOGY | Facility: HOSPITAL | Age: 65
End: 2020-10-15
Payer: COMMERCIAL

## 2020-10-15 ENCOUNTER — HOSPITAL ENCOUNTER (EMERGENCY)
Facility: HOSPITAL | Age: 65
Discharge: HOME | End: 2020-10-15
Attending: EMERGENCY MEDICINE | Admitting: EMERGENCY MEDICINE
Payer: COMMERCIAL

## 2020-10-15 VITALS
OXYGEN SATURATION: 95 % | HEART RATE: 90 BPM | SYSTOLIC BLOOD PRESSURE: 126 MMHG | TEMPERATURE: 98.3 F | RESPIRATION RATE: 17 BRPM | DIASTOLIC BLOOD PRESSURE: 93 MMHG

## 2020-10-15 DIAGNOSIS — M25.562 LEFT KNEE PAIN: Primary | ICD-10-CM

## 2020-10-15 PROCEDURE — 93971 EXTREMITY STUDY: CPT

## 2020-10-15 PROCEDURE — 99284 EMERGENCY DEPT VISIT MOD MDM: CPT

## 2020-10-15 PROCEDURE — 73560 X-RAY EXAM OF KNEE 1 OR 2: CPT

## 2020-10-15 PROCEDURE — 99284 EMERGENCY DEPT VISIT MOD MDM: CPT | Performed by: EMERGENCY MEDICINE

## 2020-10-15 RX ORDER — FAMOTIDINE 20 MG/1
20 TABLET, FILM COATED ORAL DAILY
COMMUNITY
End: 2021-09-21 | Stop reason: HOSPADM

## 2020-10-16 PROCEDURE — 93971 EXTREMITY STUDY: CPT | Performed by: SURGERY

## 2020-11-10 ENCOUNTER — HOSPITAL ENCOUNTER (INPATIENT)
Facility: HOSPITAL | Age: 65
LOS: 2 days | Discharge: ANOTHER HOSPITAL | DRG: 863 | End: 2020-11-14
Attending: EMERGENCY MEDICINE | Admitting: FAMILY MEDICINE
Payer: COMMERCIAL

## 2020-11-10 DIAGNOSIS — M54.50 ACUTE EXACERBATION OF CHRONIC LOW BACK PAIN: ICD-10-CM

## 2020-11-10 DIAGNOSIS — Z85.72 HISTORY OF NON-HODGKIN'S LYMPHOMA: ICD-10-CM

## 2020-11-10 DIAGNOSIS — M41.9 SCOLIOSIS OF THORACOLUMBAR SPINE, UNSPECIFIED SCOLIOSIS TYPE: ICD-10-CM

## 2020-11-10 DIAGNOSIS — I26.99 PULMONARY EMBOLISM (HCC): ICD-10-CM

## 2020-11-10 DIAGNOSIS — G89.18 ACUTE POST-OPERATIVE PAIN: Primary | ICD-10-CM

## 2020-11-10 DIAGNOSIS — R50.9 FEVER: ICD-10-CM

## 2020-11-10 DIAGNOSIS — R52 INTRACTABLE PAIN: ICD-10-CM

## 2020-11-10 DIAGNOSIS — G89.29 ACUTE EXACERBATION OF CHRONIC LOW BACK PAIN: ICD-10-CM

## 2020-11-10 PROCEDURE — 99285 EMERGENCY DEPT VISIT HI MDM: CPT | Performed by: EMERGENCY MEDICINE

## 2020-11-10 PROCEDURE — 99285 EMERGENCY DEPT VISIT HI MDM: CPT

## 2020-11-11 ENCOUNTER — APPOINTMENT (EMERGENCY)
Dept: RADIOLOGY | Facility: HOSPITAL | Age: 65
DRG: 863 | End: 2020-11-11
Payer: COMMERCIAL

## 2020-11-11 ENCOUNTER — APPOINTMENT (OUTPATIENT)
Dept: RADIOLOGY | Facility: HOSPITAL | Age: 65
DRG: 863 | End: 2020-11-11
Payer: COMMERCIAL

## 2020-11-11 PROBLEM — R33.9 URINARY RETENTION: Status: ACTIVE | Noted: 2020-11-11

## 2020-11-11 PROBLEM — R52 INTRACTABLE PAIN: Status: ACTIVE | Noted: 2020-11-11

## 2020-11-11 PROBLEM — M41.9 SCOLIOSIS OF THORACOLUMBAR SPINE: Status: ACTIVE | Noted: 2020-11-11

## 2020-11-11 PROBLEM — I27.82 CHRONIC PULMONARY EMBOLISM (HCC): Status: ACTIVE | Noted: 2020-11-11

## 2020-11-11 PROBLEM — R50.9 FEVER, UNKNOWN ORIGIN: Status: ACTIVE | Noted: 2020-11-11

## 2020-11-11 LAB
ALBUMIN SERPL BCP-MCNC: 2.9 G/DL (ref 3.5–5)
ALP SERPL-CCNC: 91 U/L (ref 46–116)
ALT SERPL W P-5'-P-CCNC: 32 U/L (ref 12–78)
ANION GAP SERPL CALCULATED.3IONS-SCNC: 10 MMOL/L (ref 4–13)
AST SERPL W P-5'-P-CCNC: 21 U/L (ref 5–45)
BASOPHILS # BLD AUTO: 0.04 THOUSANDS/ΜL (ref 0–0.1)
BASOPHILS NFR BLD AUTO: 0 % (ref 0–1)
BILIRUB SERPL-MCNC: 0.6 MG/DL (ref 0.2–1)
BILIRUB UR QL STRIP: NEGATIVE
BUN SERPL-MCNC: 11 MG/DL (ref 5–25)
CALCIUM ALBUM COR SERPL-MCNC: 9.7 MG/DL (ref 8.3–10.1)
CALCIUM SERPL-MCNC: 8.8 MG/DL (ref 8.3–10.1)
CHLORIDE SERPL-SCNC: 100 MMOL/L (ref 100–108)
CLARITY UR: ABNORMAL
CO2 SERPL-SCNC: 28 MMOL/L (ref 21–32)
COLOR UR: YELLOW
CREAT SERPL-MCNC: 0.78 MG/DL (ref 0.6–1.3)
EOSINOPHIL # BLD AUTO: 0.04 THOUSAND/ΜL (ref 0–0.61)
EOSINOPHIL NFR BLD AUTO: 0 % (ref 0–6)
ERYTHROCYTE [DISTWIDTH] IN BLOOD BY AUTOMATED COUNT: 15.9 % (ref 11.6–15.1)
FLUAV RNA RESP QL NAA+PROBE: NEGATIVE
FLUBV RNA RESP QL NAA+PROBE: NEGATIVE
GFR SERPL CREATININE-BSD FRML MDRD: 81 ML/MIN/1.73SQ M
GLUCOSE SERPL-MCNC: 118 MG/DL (ref 65–140)
GLUCOSE UR STRIP-MCNC: NEGATIVE MG/DL
HCT VFR BLD AUTO: 30.2 % (ref 34.8–46.1)
HGB BLD-MCNC: 9.5 G/DL (ref 11.5–15.4)
HGB UR QL STRIP.AUTO: NEGATIVE
IMM GRANULOCYTES # BLD AUTO: 0.09 THOUSAND/UL (ref 0–0.2)
IMM GRANULOCYTES NFR BLD AUTO: 1 % (ref 0–2)
KETONES UR STRIP-MCNC: ABNORMAL MG/DL
LACTATE SERPL-SCNC: 0.9 MMOL/L (ref 0.5–2)
LEUKOCYTE ESTERASE UR QL STRIP: NEGATIVE
LIPASE SERPL-CCNC: 84 U/L (ref 73–393)
LYMPHOCYTES # BLD AUTO: 1.92 THOUSANDS/ΜL (ref 0.6–4.47)
LYMPHOCYTES NFR BLD AUTO: 16 % (ref 14–44)
MCH RBC QN AUTO: 31.9 PG (ref 26.8–34.3)
MCHC RBC AUTO-ENTMCNC: 31.5 G/DL (ref 31.4–37.4)
MCV RBC AUTO: 101 FL (ref 82–98)
MONOCYTES # BLD AUTO: 1.34 THOUSAND/ΜL (ref 0.17–1.22)
MONOCYTES NFR BLD AUTO: 11 % (ref 4–12)
NEUTROPHILS # BLD AUTO: 8.5 THOUSANDS/ΜL (ref 1.85–7.62)
NEUTS SEG NFR BLD AUTO: 72 % (ref 43–75)
NITRITE UR QL STRIP: NEGATIVE
NRBC BLD AUTO-RTO: 0 /100 WBCS
PH UR STRIP.AUTO: 7.5 [PH]
PLATELET # BLD AUTO: 579 THOUSANDS/UL (ref 149–390)
PMV BLD AUTO: 9.1 FL (ref 8.9–12.7)
POTASSIUM SERPL-SCNC: 3.4 MMOL/L (ref 3.5–5.3)
PROT SERPL-MCNC: 6 G/DL (ref 6.4–8.2)
PROT UR STRIP-MCNC: NEGATIVE MG/DL
RBC # BLD AUTO: 2.98 MILLION/UL (ref 3.81–5.12)
RSV RNA RESP QL NAA+PROBE: NEGATIVE
SARS-COV-2 RNA RESP QL NAA+PROBE: NEGATIVE
SODIUM SERPL-SCNC: 138 MMOL/L (ref 136–145)
SP GR UR STRIP.AUTO: 1.01 (ref 1–1.03)
TROPONIN I SERPL-MCNC: <0.02 NG/ML
UROBILINOGEN UR QL STRIP.AUTO: 0.2 E.U./DL
WBC # BLD AUTO: 11.93 THOUSAND/UL (ref 4.31–10.16)

## 2020-11-11 PROCEDURE — 0241U HB NFCT DS VIR RESP RNA 4 TRGT: CPT | Performed by: EMERGENCY MEDICINE

## 2020-11-11 PROCEDURE — 96374 THER/PROPH/DIAG INJ IV PUSH: CPT

## 2020-11-11 PROCEDURE — 71275 CT ANGIOGRAPHY CHEST: CPT

## 2020-11-11 PROCEDURE — 93005 ELECTROCARDIOGRAM TRACING: CPT

## 2020-11-11 PROCEDURE — 83605 ASSAY OF LACTIC ACID: CPT | Performed by: EMERGENCY MEDICINE

## 2020-11-11 PROCEDURE — 85025 COMPLETE CBC W/AUTO DIFF WBC: CPT | Performed by: EMERGENCY MEDICINE

## 2020-11-11 PROCEDURE — 93970 EXTREMITY STUDY: CPT

## 2020-11-11 PROCEDURE — 51798 US URINE CAPACITY MEASURE: CPT

## 2020-11-11 PROCEDURE — 83690 ASSAY OF LIPASE: CPT | Performed by: EMERGENCY MEDICINE

## 2020-11-11 PROCEDURE — 96361 HYDRATE IV INFUSION ADD-ON: CPT

## 2020-11-11 PROCEDURE — 93970 EXTREMITY STUDY: CPT | Performed by: SURGERY

## 2020-11-11 PROCEDURE — 70450 CT HEAD/BRAIN W/O DYE: CPT

## 2020-11-11 PROCEDURE — 81003 URINALYSIS AUTO W/O SCOPE: CPT | Performed by: EMERGENCY MEDICINE

## 2020-11-11 PROCEDURE — 74177 CT ABD & PELVIS W/CONTRAST: CPT

## 2020-11-11 PROCEDURE — 99223 1ST HOSP IP/OBS HIGH 75: CPT | Performed by: INTERNAL MEDICINE

## 2020-11-11 PROCEDURE — 36415 COLL VENOUS BLD VENIPUNCTURE: CPT | Performed by: EMERGENCY MEDICINE

## 2020-11-11 PROCEDURE — 84484 ASSAY OF TROPONIN QUANT: CPT | Performed by: EMERGENCY MEDICINE

## 2020-11-11 PROCEDURE — 87086 URINE CULTURE/COLONY COUNT: CPT | Performed by: EMERGENCY MEDICINE

## 2020-11-11 PROCEDURE — 96372 THER/PROPH/DIAG INJ SC/IM: CPT

## 2020-11-11 PROCEDURE — G1004 CDSM NDSC: HCPCS

## 2020-11-11 PROCEDURE — 96376 TX/PRO/DX INJ SAME DRUG ADON: CPT

## 2020-11-11 PROCEDURE — 96375 TX/PRO/DX INJ NEW DRUG ADDON: CPT

## 2020-11-11 PROCEDURE — 80053 COMPREHEN METABOLIC PANEL: CPT | Performed by: EMERGENCY MEDICINE

## 2020-11-11 PROCEDURE — 99219 PR INITIAL OBSERVATION CARE/DAY 50 MINUTES: CPT | Performed by: FAMILY MEDICINE

## 2020-11-11 PROCEDURE — 87040 BLOOD CULTURE FOR BACTERIA: CPT | Performed by: EMERGENCY MEDICINE

## 2020-11-11 RX ORDER — HYDROMORPHONE HCL/PF 1 MG/ML
0.5 SYRINGE (ML) INJECTION EVERY 6 HOURS PRN
Status: DISCONTINUED | OUTPATIENT
Start: 2020-11-11 | End: 2020-11-12

## 2020-11-11 RX ORDER — HYDROMORPHONE HCL/PF 1 MG/ML
1 SYRINGE (ML) INJECTION ONCE
Status: COMPLETED | OUTPATIENT
Start: 2020-11-11 | End: 2020-11-11

## 2020-11-11 RX ORDER — ACETAMINOPHEN 325 MG/1
975 TABLET ORAL ONCE
Status: COMPLETED | OUTPATIENT
Start: 2020-11-11 | End: 2020-11-11

## 2020-11-11 RX ORDER — POTASSIUM CHLORIDE 20 MEQ/1
40 TABLET, EXTENDED RELEASE ORAL ONCE
Status: COMPLETED | OUTPATIENT
Start: 2020-11-11 | End: 2020-11-11

## 2020-11-11 RX ORDER — HYDROMORPHONE HCL/PF 1 MG/ML
0.5 SYRINGE (ML) INJECTION ONCE
Status: COMPLETED | OUTPATIENT
Start: 2020-11-11 | End: 2020-11-11

## 2020-11-11 RX ORDER — HYDROMORPHONE HCL/PF 1 MG/ML
1 SYRINGE (ML) INJECTION EVERY 6 HOURS PRN
Status: DISCONTINUED | OUTPATIENT
Start: 2020-11-11 | End: 2020-11-12

## 2020-11-11 RX ORDER — ACETAMINOPHEN 325 MG/1
650 TABLET ORAL EVERY 6 HOURS PRN
Status: DISCONTINUED | OUTPATIENT
Start: 2020-11-11 | End: 2020-11-14 | Stop reason: HOSPADM

## 2020-11-11 RX ORDER — KETOROLAC TROMETHAMINE 30 MG/ML
15 INJECTION, SOLUTION INTRAMUSCULAR; INTRAVENOUS EVERY 6 HOURS SCHEDULED
Status: DISCONTINUED | OUTPATIENT
Start: 2020-11-11 | End: 2020-11-13

## 2020-11-11 RX ORDER — LIDOCAINE 50 MG/G
1 PATCH TOPICAL DAILY
Status: DISCONTINUED | OUTPATIENT
Start: 2020-11-11 | End: 2020-11-14 | Stop reason: HOSPADM

## 2020-11-11 RX ADMIN — CEFEPIME HYDROCHLORIDE 2000 MG: 2 INJECTION, SOLUTION INTRAVENOUS at 05:08

## 2020-11-11 RX ADMIN — IOHEXOL 100 ML: 350 INJECTION, SOLUTION INTRAVENOUS at 01:43

## 2020-11-11 RX ADMIN — KETOROLAC TROMETHAMINE 15 MG: 30 INJECTION, SOLUTION INTRAMUSCULAR at 17:03

## 2020-11-11 RX ADMIN — CEFEPIME HYDROCHLORIDE 2000 MG: 2 INJECTION, POWDER, FOR SOLUTION INTRAVENOUS at 17:03

## 2020-11-11 RX ADMIN — HYDROMORPHONE HYDROCHLORIDE 0.5 MG: 1 INJECTION, SOLUTION INTRAMUSCULAR; INTRAVENOUS; SUBCUTANEOUS at 00:31

## 2020-11-11 RX ADMIN — KETOROLAC TROMETHAMINE 15 MG: 30 INJECTION, SOLUTION INTRAMUSCULAR at 12:04

## 2020-11-11 RX ADMIN — HYDROMORPHONE HYDROCHLORIDE 0.5 MG: 1 INJECTION, SOLUTION INTRAMUSCULAR; INTRAVENOUS; SUBCUTANEOUS at 14:16

## 2020-11-11 RX ADMIN — HYDROMORPHONE HYDROCHLORIDE 0.5 MG: 1 INJECTION, SOLUTION INTRAMUSCULAR; INTRAVENOUS; SUBCUTANEOUS at 01:16

## 2020-11-11 RX ADMIN — HYDROMORPHONE HYDROCHLORIDE 1 MG: 1 INJECTION, SOLUTION INTRAMUSCULAR; INTRAVENOUS; SUBCUTANEOUS at 04:57

## 2020-11-11 RX ADMIN — HYDROMORPHONE HYDROCHLORIDE 1 MG: 1 INJECTION, SOLUTION INTRAMUSCULAR; INTRAVENOUS; SUBCUTANEOUS at 18:40

## 2020-11-11 RX ADMIN — HYDROMORPHONE HYDROCHLORIDE 1 MG: 1 INJECTION, SOLUTION INTRAMUSCULAR; INTRAVENOUS; SUBCUTANEOUS at 09:33

## 2020-11-11 RX ADMIN — LIDOCAINE 1 PATCH: 50 PATCH TOPICAL at 21:29

## 2020-11-11 RX ADMIN — SODIUM CHLORIDE 1000 ML: 0.9 INJECTION, SOLUTION INTRAVENOUS at 00:27

## 2020-11-11 RX ADMIN — KETOROLAC TROMETHAMINE 15 MG: 30 INJECTION, SOLUTION INTRAMUSCULAR at 08:00

## 2020-11-11 RX ADMIN — VANCOMYCIN HYDROCHLORIDE 1250 MG: 10 INJECTION, POWDER, LYOPHILIZED, FOR SOLUTION INTRAVENOUS at 18:40

## 2020-11-11 RX ADMIN — POTASSIUM CHLORIDE 40 MEQ: 1500 TABLET, EXTENDED RELEASE ORAL at 08:08

## 2020-11-11 RX ADMIN — ENOXAPARIN SODIUM 135 MG: 150 INJECTION SUBCUTANEOUS at 05:01

## 2020-11-11 RX ADMIN — HYDROMORPHONE HYDROCHLORIDE 0.5 MG: 1 INJECTION, SOLUTION INTRAMUSCULAR; INTRAVENOUS; SUBCUTANEOUS at 22:21

## 2020-11-11 RX ADMIN — ACETAMINOPHEN 975 MG: 325 TABLET, FILM COATED ORAL at 04:55

## 2020-11-11 RX ADMIN — HYDROMORPHONE HYDROCHLORIDE 0.5 MG: 1 INJECTION, SOLUTION INTRAMUSCULAR; INTRAVENOUS; SUBCUTANEOUS at 08:15

## 2020-11-11 RX ADMIN — HYDROMORPHONE HYDROCHLORIDE 1 MG: 1 INJECTION, SOLUTION INTRAMUSCULAR; INTRAVENOUS; SUBCUTANEOUS at 02:32

## 2020-11-11 RX ADMIN — VANCOMYCIN HYDROCHLORIDE 1500 MG: 1 INJECTION, POWDER, LYOPHILIZED, FOR SOLUTION INTRAVENOUS at 05:53

## 2020-11-12 LAB
ALBUMIN SERPL BCP-MCNC: 2.7 G/DL (ref 3.5–5)
ALP SERPL-CCNC: 86 U/L (ref 46–116)
ALT SERPL W P-5'-P-CCNC: 28 U/L (ref 12–78)
ANION GAP SERPL CALCULATED.3IONS-SCNC: 10 MMOL/L (ref 4–13)
AST SERPL W P-5'-P-CCNC: 16 U/L (ref 5–45)
BACTERIA UR CULT: NORMAL
BASOPHILS # BLD AUTO: 0.05 THOUSANDS/ΜL (ref 0–0.1)
BASOPHILS NFR BLD AUTO: 1 % (ref 0–1)
BILIRUB SERPL-MCNC: 0.5 MG/DL (ref 0.2–1)
BUN SERPL-MCNC: 13 MG/DL (ref 5–25)
CALCIUM ALBUM COR SERPL-MCNC: 9.6 MG/DL (ref 8.3–10.1)
CALCIUM SERPL-MCNC: 8.6 MG/DL (ref 8.3–10.1)
CHLORIDE SERPL-SCNC: 101 MMOL/L (ref 100–108)
CO2 SERPL-SCNC: 25 MMOL/L (ref 21–32)
CREAT SERPL-MCNC: 0.54 MG/DL (ref 0.6–1.3)
EOSINOPHIL # BLD AUTO: 0.13 THOUSAND/ΜL (ref 0–0.61)
EOSINOPHIL NFR BLD AUTO: 1 % (ref 0–6)
ERYTHROCYTE [DISTWIDTH] IN BLOOD BY AUTOMATED COUNT: 15.7 % (ref 11.6–15.1)
GFR SERPL CREATININE-BSD FRML MDRD: 101 ML/MIN/1.73SQ M
GLUCOSE SERPL-MCNC: 113 MG/DL (ref 65–140)
HCT VFR BLD AUTO: 29.7 % (ref 34.8–46.1)
HGB BLD-MCNC: 9.2 G/DL (ref 11.5–15.4)
IMM GRANULOCYTES # BLD AUTO: 0.09 THOUSAND/UL (ref 0–0.2)
IMM GRANULOCYTES NFR BLD AUTO: 1 % (ref 0–2)
LYMPHOCYTES # BLD AUTO: 1.31 THOUSANDS/ΜL (ref 0.6–4.47)
LYMPHOCYTES NFR BLD AUTO: 12 % (ref 14–44)
MAGNESIUM SERPL-MCNC: 1.9 MG/DL (ref 1.6–2.6)
MCH RBC QN AUTO: 31.6 PG (ref 26.8–34.3)
MCHC RBC AUTO-ENTMCNC: 31 G/DL (ref 31.4–37.4)
MCV RBC AUTO: 102 FL (ref 82–98)
MONOCYTES # BLD AUTO: 1.03 THOUSAND/ΜL (ref 0.17–1.22)
MONOCYTES NFR BLD AUTO: 9 % (ref 4–12)
NEUTROPHILS # BLD AUTO: 8.4 THOUSANDS/ΜL (ref 1.85–7.62)
NEUTS SEG NFR BLD AUTO: 76 % (ref 43–75)
NRBC BLD AUTO-RTO: 0 /100 WBCS
PHOSPHATE SERPL-MCNC: 3.7 MG/DL (ref 2.3–4.1)
PLATELET # BLD AUTO: 571 THOUSANDS/UL (ref 149–390)
PMV BLD AUTO: 8.8 FL (ref 8.9–12.7)
POTASSIUM SERPL-SCNC: 3.6 MMOL/L (ref 3.5–5.3)
PROT SERPL-MCNC: 6.2 G/DL (ref 6.4–8.2)
RBC # BLD AUTO: 2.91 MILLION/UL (ref 3.81–5.12)
SODIUM SERPL-SCNC: 136 MMOL/L (ref 136–145)
VANCOMYCIN TROUGH SERPL-MCNC: 10.3 UG/ML (ref 10–20)
WBC # BLD AUTO: 11.01 THOUSAND/UL (ref 4.31–10.16)

## 2020-11-12 PROCEDURE — 99232 SBSQ HOSP IP/OBS MODERATE 35: CPT | Performed by: INTERNAL MEDICINE

## 2020-11-12 PROCEDURE — 80202 ASSAY OF VANCOMYCIN: CPT | Performed by: STUDENT IN AN ORGANIZED HEALTH CARE EDUCATION/TRAINING PROGRAM

## 2020-11-12 PROCEDURE — 83735 ASSAY OF MAGNESIUM: CPT | Performed by: STUDENT IN AN ORGANIZED HEALTH CARE EDUCATION/TRAINING PROGRAM

## 2020-11-12 PROCEDURE — 80053 COMPREHEN METABOLIC PANEL: CPT | Performed by: STUDENT IN AN ORGANIZED HEALTH CARE EDUCATION/TRAINING PROGRAM

## 2020-11-12 PROCEDURE — 99233 SBSQ HOSP IP/OBS HIGH 50: CPT | Performed by: FAMILY MEDICINE

## 2020-11-12 PROCEDURE — 85025 COMPLETE CBC W/AUTO DIFF WBC: CPT | Performed by: STUDENT IN AN ORGANIZED HEALTH CARE EDUCATION/TRAINING PROGRAM

## 2020-11-12 PROCEDURE — 84100 ASSAY OF PHOSPHORUS: CPT | Performed by: STUDENT IN AN ORGANIZED HEALTH CARE EDUCATION/TRAINING PROGRAM

## 2020-11-12 RX ORDER — POTASSIUM CHLORIDE 20 MEQ/1
40 TABLET, EXTENDED RELEASE ORAL ONCE
Status: DISCONTINUED | OUTPATIENT
Start: 2020-11-12 | End: 2020-11-12

## 2020-11-12 RX ORDER — HYDROMORPHONE HCL/PF 1 MG/ML
1 SYRINGE (ML) INJECTION EVERY 6 HOURS
Status: DISCONTINUED | OUTPATIENT
Start: 2020-11-12 | End: 2020-11-14

## 2020-11-12 RX ORDER — HYDROMORPHONE HCL/PF 1 MG/ML
1 SYRINGE (ML) INJECTION EVERY 6 HOURS PRN
Status: DISCONTINUED | OUTPATIENT
Start: 2020-11-12 | End: 2020-11-14

## 2020-11-12 RX ORDER — POTASSIUM CHLORIDE 20 MEQ/1
40 TABLET, EXTENDED RELEASE ORAL ONCE
Status: COMPLETED | OUTPATIENT
Start: 2020-11-12 | End: 2020-11-12

## 2020-11-12 RX ADMIN — VANCOMYCIN HYDROCHLORIDE 1250 MG: 10 INJECTION, POWDER, LYOPHILIZED, FOR SOLUTION INTRAVENOUS at 18:44

## 2020-11-12 RX ADMIN — KETOROLAC TROMETHAMINE 15 MG: 30 INJECTION, SOLUTION INTRAMUSCULAR at 06:06

## 2020-11-12 RX ADMIN — KETOROLAC TROMETHAMINE 15 MG: 30 INJECTION, SOLUTION INTRAMUSCULAR at 12:00

## 2020-11-12 RX ADMIN — HYDROMORPHONE HYDROCHLORIDE 0.5 MG: 1 INJECTION, SOLUTION INTRAMUSCULAR; INTRAVENOUS; SUBCUTANEOUS at 06:03

## 2020-11-12 RX ADMIN — HYDROMORPHONE HYDROCHLORIDE 1 MG: 1 INJECTION, SOLUTION INTRAMUSCULAR; INTRAVENOUS; SUBCUTANEOUS at 23:08

## 2020-11-12 RX ADMIN — POTASSIUM CHLORIDE 40 MEQ: 1500 TABLET, EXTENDED RELEASE ORAL at 08:39

## 2020-11-12 RX ADMIN — HYDROMORPHONE HYDROCHLORIDE 1 MG: 1 INJECTION, SOLUTION INTRAMUSCULAR; INTRAVENOUS; SUBCUTANEOUS at 00:49

## 2020-11-12 RX ADMIN — KETOROLAC TROMETHAMINE 15 MG: 30 INJECTION, SOLUTION INTRAMUSCULAR at 18:19

## 2020-11-12 RX ADMIN — HYDROMORPHONE HYDROCHLORIDE 1 MG: 1 INJECTION, SOLUTION INTRAMUSCULAR; INTRAVENOUS; SUBCUTANEOUS at 07:45

## 2020-11-12 RX ADMIN — CEFEPIME HYDROCHLORIDE 2000 MG: 2 INJECTION, POWDER, FOR SOLUTION INTRAVENOUS at 06:05

## 2020-11-12 RX ADMIN — HYDROMORPHONE HYDROCHLORIDE 1 MG: 1 INJECTION, SOLUTION INTRAMUSCULAR; INTRAVENOUS; SUBCUTANEOUS at 13:45

## 2020-11-12 RX ADMIN — CEFEPIME HYDROCHLORIDE 2000 MG: 2 INJECTION, POWDER, FOR SOLUTION INTRAVENOUS at 17:00

## 2020-11-12 RX ADMIN — VANCOMYCIN HYDROCHLORIDE 1250 MG: 10 INJECTION, POWDER, LYOPHILIZED, FOR SOLUTION INTRAVENOUS at 07:46

## 2020-11-12 RX ADMIN — ENOXAPARIN SODIUM 40 MG: 40 INJECTION SUBCUTANEOUS at 08:39

## 2020-11-12 RX ADMIN — LIDOCAINE 1 PATCH: 50 PATCH TOPICAL at 08:39

## 2020-11-12 RX ADMIN — KETOROLAC TROMETHAMINE 15 MG: 30 INJECTION, SOLUTION INTRAMUSCULAR at 00:36

## 2020-11-12 RX ADMIN — HYDROMORPHONE HYDROCHLORIDE 1 MG: 1 INJECTION, SOLUTION INTRAMUSCULAR; INTRAVENOUS; SUBCUTANEOUS at 19:59

## 2020-11-12 RX ADMIN — HYDROMORPHONE HYDROCHLORIDE 1 MG: 1 INJECTION, SOLUTION INTRAMUSCULAR; INTRAVENOUS; SUBCUTANEOUS at 17:00

## 2020-11-13 PROBLEM — D75.839 THROMBOCYTOSIS: Status: ACTIVE | Noted: 2020-11-13

## 2020-11-13 PROBLEM — D53.9 MACROCYTIC ANEMIA: Status: ACTIVE | Noted: 2020-11-13

## 2020-11-13 LAB
ALBUMIN SERPL BCP-MCNC: 2.6 G/DL (ref 3.5–5)
ALP SERPL-CCNC: 86 U/L (ref 46–116)
ALT SERPL W P-5'-P-CCNC: 24 U/L (ref 12–78)
ANION GAP SERPL CALCULATED.3IONS-SCNC: 8 MMOL/L (ref 4–13)
AST SERPL W P-5'-P-CCNC: 12 U/L (ref 5–45)
BASOPHILS # BLD AUTO: 0.03 THOUSANDS/ΜL (ref 0–0.1)
BASOPHILS NFR BLD AUTO: 0 % (ref 0–1)
BILIRUB SERPL-MCNC: 0.5 MG/DL (ref 0.2–1)
BUN SERPL-MCNC: 13 MG/DL (ref 5–25)
CALCIUM ALBUM COR SERPL-MCNC: 9.8 MG/DL (ref 8.3–10.1)
CALCIUM SERPL-MCNC: 8.7 MG/DL (ref 8.3–10.1)
CHLORIDE SERPL-SCNC: 101 MMOL/L (ref 100–108)
CO2 SERPL-SCNC: 27 MMOL/L (ref 21–32)
CREAT SERPL-MCNC: 0.6 MG/DL (ref 0.6–1.3)
EOSINOPHIL # BLD AUTO: 0.2 THOUSAND/ΜL (ref 0–0.61)
EOSINOPHIL NFR BLD AUTO: 2 % (ref 0–6)
ERYTHROCYTE [DISTWIDTH] IN BLOOD BY AUTOMATED COUNT: 15.3 % (ref 11.6–15.1)
GFR SERPL CREATININE-BSD FRML MDRD: 97 ML/MIN/1.73SQ M
GLUCOSE SERPL-MCNC: 107 MG/DL (ref 65–140)
HCT VFR BLD AUTO: 28.3 % (ref 34.8–46.1)
HGB BLD-MCNC: 8.8 G/DL (ref 11.5–15.4)
IMM GRANULOCYTES # BLD AUTO: 0.09 THOUSAND/UL (ref 0–0.2)
IMM GRANULOCYTES NFR BLD AUTO: 1 % (ref 0–2)
LYMPHOCYTES # BLD AUTO: 1.36 THOUSANDS/ΜL (ref 0.6–4.47)
LYMPHOCYTES NFR BLD AUTO: 12 % (ref 14–44)
MAGNESIUM SERPL-MCNC: 1.8 MG/DL (ref 1.6–2.6)
MCH RBC QN AUTO: 32 PG (ref 26.8–34.3)
MCHC RBC AUTO-ENTMCNC: 31.1 G/DL (ref 31.4–37.4)
MCV RBC AUTO: 103 FL (ref 82–98)
MONOCYTES # BLD AUTO: 0.91 THOUSAND/ΜL (ref 0.17–1.22)
MONOCYTES NFR BLD AUTO: 8 % (ref 4–12)
NEUTROPHILS # BLD AUTO: 8.34 THOUSANDS/ΜL (ref 1.85–7.62)
NEUTS SEG NFR BLD AUTO: 77 % (ref 43–75)
NRBC BLD AUTO-RTO: 0 /100 WBCS
PHOSPHATE SERPL-MCNC: 3.1 MG/DL (ref 2.3–4.1)
PLATELET # BLD AUTO: 635 THOUSANDS/UL (ref 149–390)
PMV BLD AUTO: 9.5 FL (ref 8.9–12.7)
POTASSIUM SERPL-SCNC: 3.6 MMOL/L (ref 3.5–5.3)
PROT SERPL-MCNC: 5.9 G/DL (ref 6.4–8.2)
RBC # BLD AUTO: 2.75 MILLION/UL (ref 3.81–5.12)
SODIUM SERPL-SCNC: 136 MMOL/L (ref 136–145)
WBC # BLD AUTO: 10.93 THOUSAND/UL (ref 4.31–10.16)

## 2020-11-13 PROCEDURE — 99233 SBSQ HOSP IP/OBS HIGH 50: CPT | Performed by: FAMILY MEDICINE

## 2020-11-13 PROCEDURE — 84100 ASSAY OF PHOSPHORUS: CPT | Performed by: STUDENT IN AN ORGANIZED HEALTH CARE EDUCATION/TRAINING PROGRAM

## 2020-11-13 PROCEDURE — 99232 SBSQ HOSP IP/OBS MODERATE 35: CPT | Performed by: INTERNAL MEDICINE

## 2020-11-13 PROCEDURE — 80053 COMPREHEN METABOLIC PANEL: CPT | Performed by: STUDENT IN AN ORGANIZED HEALTH CARE EDUCATION/TRAINING PROGRAM

## 2020-11-13 PROCEDURE — 85025 COMPLETE CBC W/AUTO DIFF WBC: CPT | Performed by: STUDENT IN AN ORGANIZED HEALTH CARE EDUCATION/TRAINING PROGRAM

## 2020-11-13 PROCEDURE — 83735 ASSAY OF MAGNESIUM: CPT | Performed by: STUDENT IN AN ORGANIZED HEALTH CARE EDUCATION/TRAINING PROGRAM

## 2020-11-13 RX ORDER — LEVOTHYROXINE SODIUM 0.05 MG/1
50 TABLET ORAL
Status: DISCONTINUED | OUTPATIENT
Start: 2020-11-13 | End: 2020-11-14 | Stop reason: HOSPADM

## 2020-11-13 RX ORDER — DEXTROSE, SODIUM CHLORIDE, AND POTASSIUM CHLORIDE 5; .9; .15 G/100ML; G/100ML; G/100ML
75 INJECTION INTRAVENOUS CONTINUOUS
Status: DISCONTINUED | OUTPATIENT
Start: 2020-11-13 | End: 2020-11-13

## 2020-11-13 RX ORDER — HYDROCHLOROTHIAZIDE 12.5 MG/1
12.5 TABLET ORAL DAILY
Status: DISCONTINUED | OUTPATIENT
Start: 2020-11-13 | End: 2020-11-14

## 2020-11-13 RX ORDER — DOCUSATE SODIUM 100 MG/1
100 CAPSULE, LIQUID FILLED ORAL
Status: DISCONTINUED | OUTPATIENT
Start: 2020-11-13 | End: 2020-11-14 | Stop reason: HOSPADM

## 2020-11-13 RX ORDER — CYCLOBENZAPRINE HCL 10 MG
10 TABLET ORAL 3 TIMES DAILY
Status: DISCONTINUED | OUTPATIENT
Start: 2020-11-13 | End: 2020-11-14 | Stop reason: HOSPADM

## 2020-11-13 RX ORDER — FAMOTIDINE 20 MG/1
20 TABLET, FILM COATED ORAL DAILY
Status: DISCONTINUED | OUTPATIENT
Start: 2020-11-13 | End: 2020-11-14 | Stop reason: HOSPADM

## 2020-11-13 RX ORDER — DEXTROSE, SODIUM CHLORIDE, AND POTASSIUM CHLORIDE 5; .45; .15 G/100ML; G/100ML; G/100ML
75 INJECTION INTRAVENOUS CONTINUOUS
Status: DISCONTINUED | OUTPATIENT
Start: 2020-11-13 | End: 2020-11-14 | Stop reason: HOSPADM

## 2020-11-13 RX ORDER — MELATONIN
1000 DAILY
Status: DISCONTINUED | OUTPATIENT
Start: 2020-11-13 | End: 2020-11-14 | Stop reason: HOSPADM

## 2020-11-13 RX ORDER — GABAPENTIN 300 MG/1
600 CAPSULE ORAL 3 TIMES DAILY
Status: DISCONTINUED | OUTPATIENT
Start: 2020-11-13 | End: 2020-11-14 | Stop reason: HOSPADM

## 2020-11-13 RX ADMIN — CEFEPIME HYDROCHLORIDE 2000 MG: 2 INJECTION, POWDER, FOR SOLUTION INTRAVENOUS at 05:49

## 2020-11-13 RX ADMIN — HYDROCHLOROTHIAZIDE 12.5 MG: 12.5 TABLET ORAL at 15:56

## 2020-11-13 RX ADMIN — VANCOMYCIN HYDROCHLORIDE 2000 MG: 10 INJECTION, POWDER, LYOPHILIZED, FOR SOLUTION INTRAVENOUS at 03:15

## 2020-11-13 RX ADMIN — VANCOMYCIN HYDROCHLORIDE 2000 MG: 10 INJECTION, POWDER, LYOPHILIZED, FOR SOLUTION INTRAVENOUS at 15:44

## 2020-11-13 RX ADMIN — HYDROMORPHONE HYDROCHLORIDE 1 MG: 1 INJECTION, SOLUTION INTRAMUSCULAR; INTRAVENOUS; SUBCUTANEOUS at 20:57

## 2020-11-13 RX ADMIN — KETOROLAC TROMETHAMINE 15 MG: 30 INJECTION, SOLUTION INTRAMUSCULAR at 00:18

## 2020-11-13 RX ADMIN — CYCLOBENZAPRINE HYDROCHLORIDE 10 MG: 10 TABLET, FILM COATED ORAL at 21:04

## 2020-11-13 RX ADMIN — HYDROMORPHONE HYDROCHLORIDE 1 MG: 1 INJECTION, SOLUTION INTRAMUSCULAR; INTRAVENOUS; SUBCUTANEOUS at 17:32

## 2020-11-13 RX ADMIN — HYDROMORPHONE HYDROCHLORIDE 1 MG: 1 INJECTION, SOLUTION INTRAMUSCULAR; INTRAVENOUS; SUBCUTANEOUS at 08:03

## 2020-11-13 RX ADMIN — HYDROMORPHONE HYDROCHLORIDE 1 MG: 1 INJECTION, SOLUTION INTRAMUSCULAR; INTRAVENOUS; SUBCUTANEOUS at 13:37

## 2020-11-13 RX ADMIN — DEXTROSE, SODIUM CHLORIDE, AND POTASSIUM CHLORIDE 75 ML/HR: 5; .9; .15 INJECTION INTRAVENOUS at 15:44

## 2020-11-13 RX ADMIN — CEFEPIME HYDROCHLORIDE 2000 MG: 2 INJECTION, POWDER, FOR SOLUTION INTRAVENOUS at 18:21

## 2020-11-13 RX ADMIN — HYDROMORPHONE HYDROCHLORIDE 1 MG: 1 INJECTION, SOLUTION INTRAMUSCULAR; INTRAVENOUS; SUBCUTANEOUS at 02:09

## 2020-11-13 RX ADMIN — KETOROLAC TROMETHAMINE 15 MG: 30 INJECTION, SOLUTION INTRAMUSCULAR at 06:19

## 2020-11-13 RX ADMIN — FAMOTIDINE 20 MG: 20 TABLET, FILM COATED ORAL at 15:44

## 2020-11-13 RX ADMIN — LEVOTHYROXINE SODIUM 50 MCG: 50 TABLET ORAL at 15:44

## 2020-11-13 RX ADMIN — HYDROMORPHONE HYDROCHLORIDE 1 MG: 1 INJECTION, SOLUTION INTRAMUSCULAR; INTRAVENOUS; SUBCUTANEOUS at 11:34

## 2020-11-13 RX ADMIN — DOCUSATE SODIUM 100 MG: 100 CAPSULE, LIQUID FILLED ORAL at 21:05

## 2020-11-13 RX ADMIN — GABAPENTIN 600 MG: 300 CAPSULE ORAL at 15:44

## 2020-11-13 RX ADMIN — DEXTROSE, SODIUM CHLORIDE, AND POTASSIUM CHLORIDE 75 ML/HR: 5; .45; .15 INJECTION INTRAVENOUS at 17:15

## 2020-11-13 RX ADMIN — Medication 1000 UNITS: at 15:44

## 2020-11-13 RX ADMIN — ENOXAPARIN SODIUM 40 MG: 40 INJECTION SUBCUTANEOUS at 08:03

## 2020-11-13 RX ADMIN — HYDROMORPHONE HYDROCHLORIDE 1 MG: 1 INJECTION, SOLUTION INTRAMUSCULAR; INTRAVENOUS; SUBCUTANEOUS at 05:09

## 2020-11-13 RX ADMIN — LIDOCAINE 1 PATCH: 50 PATCH TOPICAL at 08:03

## 2020-11-13 RX ADMIN — CYCLOBENZAPRINE HYDROCHLORIDE 10 MG: 10 TABLET, FILM COATED ORAL at 15:44

## 2020-11-13 RX ADMIN — GABAPENTIN 600 MG: 300 CAPSULE ORAL at 21:04

## 2020-11-13 RX ADMIN — LISINOPRIL: 10 TABLET ORAL at 15:55

## 2020-11-14 VITALS
BODY MASS INDEX: 35.97 KG/M2 | DIASTOLIC BLOOD PRESSURE: 84 MMHG | RESPIRATION RATE: 18 BRPM | WEIGHT: 203 LBS | HEART RATE: 102 BPM | SYSTOLIC BLOOD PRESSURE: 129 MMHG | OXYGEN SATURATION: 97 % | TEMPERATURE: 98.9 F | HEIGHT: 63 IN

## 2020-11-14 LAB
ALBUMIN SERPL BCP-MCNC: 2.9 G/DL (ref 3.5–5)
ALP SERPL-CCNC: 106 U/L (ref 46–116)
ALT SERPL W P-5'-P-CCNC: 26 U/L (ref 12–78)
ANION GAP SERPL CALCULATED.3IONS-SCNC: 10 MMOL/L (ref 4–13)
AST SERPL W P-5'-P-CCNC: 17 U/L (ref 5–45)
BASOPHILS # BLD AUTO: 0.08 THOUSANDS/ΜL (ref 0–0.1)
BASOPHILS NFR BLD AUTO: 1 % (ref 0–1)
BILIRUB SERPL-MCNC: 0.4 MG/DL (ref 0.2–1)
BUN SERPL-MCNC: 7 MG/DL (ref 5–25)
CALCIUM ALBUM COR SERPL-MCNC: 10.1 MG/DL (ref 8.3–10.1)
CALCIUM SERPL-MCNC: 9.2 MG/DL (ref 8.3–10.1)
CHLORIDE SERPL-SCNC: 100 MMOL/L (ref 100–108)
CO2 SERPL-SCNC: 26 MMOL/L (ref 21–32)
CREAT SERPL-MCNC: 0.75 MG/DL (ref 0.6–1.3)
EOSINOPHIL # BLD AUTO: 0.32 THOUSAND/ΜL (ref 0–0.61)
EOSINOPHIL NFR BLD AUTO: 3 % (ref 0–6)
ERYTHROCYTE [DISTWIDTH] IN BLOOD BY AUTOMATED COUNT: 15.1 % (ref 11.6–15.1)
GFR SERPL CREATININE-BSD FRML MDRD: 85 ML/MIN/1.73SQ M
GLUCOSE SERPL-MCNC: 135 MG/DL (ref 65–140)
HCT VFR BLD AUTO: 34.8 % (ref 34.8–46.1)
HGB BLD-MCNC: 10.2 G/DL (ref 11.5–15.4)
IMM GRANULOCYTES # BLD AUTO: 0.09 THOUSAND/UL (ref 0–0.2)
IMM GRANULOCYTES NFR BLD AUTO: 1 % (ref 0–2)
LYMPHOCYTES # BLD AUTO: 1.64 THOUSANDS/ΜL (ref 0.6–4.47)
LYMPHOCYTES NFR BLD AUTO: 13 % (ref 14–44)
MAGNESIUM SERPL-MCNC: 2 MG/DL (ref 1.6–2.6)
MCH RBC QN AUTO: 31.6 PG (ref 26.8–34.3)
MCHC RBC AUTO-ENTMCNC: 29.3 G/DL (ref 31.4–37.4)
MCV RBC AUTO: 108 FL (ref 82–98)
MONOCYTES # BLD AUTO: 0.85 THOUSAND/ΜL (ref 0.17–1.22)
MONOCYTES NFR BLD AUTO: 7 % (ref 4–12)
NEUTROPHILS # BLD AUTO: 9.3 THOUSANDS/ΜL (ref 1.85–7.62)
NEUTS SEG NFR BLD AUTO: 75 % (ref 43–75)
NRBC BLD AUTO-RTO: 0 /100 WBCS
PHOSPHATE SERPL-MCNC: 3.5 MG/DL (ref 2.3–4.1)
PLATELET # BLD AUTO: 781 THOUSANDS/UL (ref 149–390)
PMV BLD AUTO: 9.3 FL (ref 8.9–12.7)
POTASSIUM SERPL-SCNC: 3.8 MMOL/L (ref 3.5–5.3)
PROT SERPL-MCNC: 6.6 G/DL (ref 6.4–8.2)
RBC # BLD AUTO: 3.23 MILLION/UL (ref 3.81–5.12)
SODIUM SERPL-SCNC: 136 MMOL/L (ref 136–145)
WBC # BLD AUTO: 12.28 THOUSAND/UL (ref 4.31–10.16)

## 2020-11-14 PROCEDURE — 99239 HOSP IP/OBS DSCHRG MGMT >30: CPT | Performed by: FAMILY MEDICINE

## 2020-11-14 PROCEDURE — 85025 COMPLETE CBC W/AUTO DIFF WBC: CPT | Performed by: STUDENT IN AN ORGANIZED HEALTH CARE EDUCATION/TRAINING PROGRAM

## 2020-11-14 PROCEDURE — 84100 ASSAY OF PHOSPHORUS: CPT | Performed by: STUDENT IN AN ORGANIZED HEALTH CARE EDUCATION/TRAINING PROGRAM

## 2020-11-14 PROCEDURE — 80053 COMPREHEN METABOLIC PANEL: CPT | Performed by: STUDENT IN AN ORGANIZED HEALTH CARE EDUCATION/TRAINING PROGRAM

## 2020-11-14 PROCEDURE — 83735 ASSAY OF MAGNESIUM: CPT | Performed by: STUDENT IN AN ORGANIZED HEALTH CARE EDUCATION/TRAINING PROGRAM

## 2020-11-14 RX ORDER — POTASSIUM CHLORIDE 20 MEQ/1
20 TABLET, EXTENDED RELEASE ORAL ONCE
Status: COMPLETED | OUTPATIENT
Start: 2020-11-14 | End: 2020-11-14

## 2020-11-14 RX ORDER — LIDOCAINE 50 MG/G
1 PATCH TOPICAL DAILY
Qty: 5 PATCH | Refills: 0 | Status: SHIPPED | OUTPATIENT
Start: 2020-11-15 | End: 2021-09-21 | Stop reason: HOSPADM

## 2020-11-14 RX ORDER — HYDROMORPHONE HCL 110MG/55ML
1.5 PATIENT CONTROLLED ANALGESIA SYRINGE INTRAVENOUS ONCE
Qty: 0.75 ML | Refills: 0 | Status: SHIPPED | OUTPATIENT
Start: 2020-11-14 | End: 2020-11-14

## 2020-11-14 RX ORDER — HYDROMORPHONE HCL 110MG/55ML
1.5 PATIENT CONTROLLED ANALGESIA SYRINGE INTRAVENOUS EVERY 4 HOURS
Status: DISCONTINUED | OUTPATIENT
Start: 2020-11-14 | End: 2020-11-14 | Stop reason: HOSPADM

## 2020-11-14 RX ORDER — HYDROMORPHONE HCL 110MG/55ML
1.5 PATIENT CONTROLLED ANALGESIA SYRINGE INTRAVENOUS ONCE
Status: DISCONTINUED | OUTPATIENT
Start: 2020-11-14 | End: 2020-11-14

## 2020-11-14 RX ORDER — HYDROMORPHONE HCL 110MG/55ML
1.5 PATIENT CONTROLLED ANALGESIA SYRINGE INTRAVENOUS EVERY 4 HOURS
Qty: 45 ML | Refills: 0 | Status: SHIPPED | OUTPATIENT
Start: 2020-11-14 | End: 2020-11-24

## 2020-11-14 RX ORDER — HYDROMORPHONE HCL 110MG/55ML
1.5 PATIENT CONTROLLED ANALGESIA SYRINGE INTRAVENOUS ONCE
Status: COMPLETED | OUTPATIENT
Start: 2020-11-14 | End: 2020-11-14

## 2020-11-14 RX ORDER — HYDROMORPHONE HCL/PF 1 MG/ML
1 SYRINGE (ML) INJECTION EVERY 4 HOURS PRN
Status: DISCONTINUED | OUTPATIENT
Start: 2020-11-14 | End: 2020-11-14 | Stop reason: HOSPADM

## 2020-11-14 RX ADMIN — FAMOTIDINE 20 MG: 20 TABLET, FILM COATED ORAL at 09:11

## 2020-11-14 RX ADMIN — CEFEPIME HYDROCHLORIDE 2000 MG: 2 INJECTION, POWDER, FOR SOLUTION INTRAVENOUS at 05:22

## 2020-11-14 RX ADMIN — CYCLOBENZAPRINE HYDROCHLORIDE 10 MG: 10 TABLET, FILM COATED ORAL at 09:11

## 2020-11-14 RX ADMIN — VANCOMYCIN HYDROCHLORIDE 2000 MG: 10 INJECTION, POWDER, LYOPHILIZED, FOR SOLUTION INTRAVENOUS at 03:01

## 2020-11-14 RX ADMIN — GABAPENTIN 600 MG: 300 CAPSULE ORAL at 09:11

## 2020-11-14 RX ADMIN — POTASSIUM CHLORIDE 20 MEQ: 1500 TABLET, EXTENDED RELEASE ORAL at 09:11

## 2020-11-14 RX ADMIN — LISINOPRIL: 10 TABLET ORAL at 09:11

## 2020-11-14 RX ADMIN — LEVOTHYROXINE SODIUM 50 MCG: 50 TABLET ORAL at 05:24

## 2020-11-14 RX ADMIN — LIDOCAINE 1 PATCH: 50 PATCH TOPICAL at 09:11

## 2020-11-14 RX ADMIN — ENOXAPARIN SODIUM 40 MG: 40 INJECTION SUBCUTANEOUS at 09:11

## 2020-11-14 RX ADMIN — HYDROMORPHONE HYDROCHLORIDE 1 MG: 1 INJECTION, SOLUTION INTRAMUSCULAR; INTRAVENOUS; SUBCUTANEOUS at 02:48

## 2020-11-14 RX ADMIN — DEXTROSE, SODIUM CHLORIDE, AND POTASSIUM CHLORIDE 75 ML/HR: 5; .45; .15 INJECTION INTRAVENOUS at 10:56

## 2020-11-14 RX ADMIN — Medication 1000 UNITS: at 09:11

## 2020-11-14 RX ADMIN — HYDROMORPHONE HYDROCHLORIDE 1.5 MG: 2 INJECTION, SOLUTION INTRAMUSCULAR; INTRAVENOUS; SUBCUTANEOUS at 10:33

## 2020-11-14 RX ADMIN — HYDROMORPHONE HYDROCHLORIDE 1 MG: 1 INJECTION, SOLUTION INTRAMUSCULAR; INTRAVENOUS; SUBCUTANEOUS at 07:46

## 2020-11-15 LAB
ATRIAL RATE: 103 BPM
ATRIAL RATE: 98 BPM
P AXIS: 58 DEGREES
P AXIS: 59 DEGREES
PR INTERVAL: 126 MS
PR INTERVAL: 128 MS
QRS AXIS: 25 DEGREES
QRS AXIS: 26 DEGREES
QRSD INTERVAL: 82 MS
QRSD INTERVAL: 90 MS
QT INTERVAL: 370 MS
QT INTERVAL: 374 MS
QTC INTERVAL: 477 MS
QTC INTERVAL: 484 MS
T WAVE AXIS: 30 DEGREES
T WAVE AXIS: 47 DEGREES
VENTRICULAR RATE: 103 BPM
VENTRICULAR RATE: 98 BPM

## 2020-11-15 PROCEDURE — 93010 ELECTROCARDIOGRAM REPORT: CPT | Performed by: INTERNAL MEDICINE

## 2020-11-16 LAB
BACTERIA BLD CULT: NORMAL
BACTERIA BLD CULT: NORMAL

## 2021-01-25 ENCOUNTER — TELEPHONE (OUTPATIENT)
Dept: BARIATRICS | Facility: CLINIC | Age: 66
End: 2021-01-25

## 2021-01-25 NOTE — TELEPHONE ENCOUNTER
Called patient to reschedule called appointment from 12/3/2020, per patient she does not wish to schedule appointment at this time   She has not had any issues and has been following up with her gastroenterologist   Anaya Beckett to schedule patient for her annual , however patient stated she will call when she is ready

## 2021-02-10 ENCOUNTER — TRANSCRIBE ORDERS (OUTPATIENT)
Dept: ADMINISTRATIVE | Facility: HOSPITAL | Age: 66
End: 2021-02-10

## 2021-02-10 DIAGNOSIS — Z12.31 ENCOUNTER FOR SCREENING MAMMOGRAM FOR MALIGNANT NEOPLASM OF BREAST: Primary | ICD-10-CM

## 2021-03-10 DIAGNOSIS — Z23 ENCOUNTER FOR IMMUNIZATION: ICD-10-CM

## 2021-03-16 ENCOUNTER — HOSPITAL ENCOUNTER (OUTPATIENT)
Dept: RADIOLOGY | Facility: HOSPITAL | Age: 66
Discharge: HOME | End: 2021-03-16
Payer: COMMERCIAL

## 2021-03-16 VITALS — BODY MASS INDEX: 33.66 KG/M2 | HEIGHT: 63 IN | WEIGHT: 190 LBS

## 2021-03-16 DIAGNOSIS — Z12.31 ENCOUNTER FOR SCREENING MAMMOGRAM FOR MALIGNANT NEOPLASM OF BREAST: ICD-10-CM

## 2021-03-16 PROCEDURE — 77067 SCR MAMMO BI INCL CAD: CPT

## 2021-03-16 PROCEDURE — 77063 BREAST TOMOSYNTHESIS BI: CPT

## 2021-04-19 ENCOUNTER — TELEPHONE (OUTPATIENT)
Dept: GASTROENTEROLOGY | Facility: CLINIC | Age: 66
End: 2021-04-19

## 2021-04-19 NOTE — TELEPHONE ENCOUNTER
Received message from pt to be scheduled for an appt  I returned her call and lmom for her to please call back  She is due for a 1yr f/u ov with Dr Cristel Aldrich  If pt calls back, please schedule her  Thank you!

## 2021-05-12 ENCOUNTER — OFFICE VISIT (OUTPATIENT)
Dept: GASTROENTEROLOGY | Facility: CLINIC | Age: 66
End: 2021-05-12
Payer: COMMERCIAL

## 2021-05-12 VITALS
HEIGHT: 65 IN | DIASTOLIC BLOOD PRESSURE: 69 MMHG | WEIGHT: 195 LBS | BODY MASS INDEX: 32.49 KG/M2 | SYSTOLIC BLOOD PRESSURE: 120 MMHG | HEART RATE: 83 BPM

## 2021-05-12 DIAGNOSIS — K21.9 GASTROESOPHAGEAL REFLUX DISEASE WITHOUT ESOPHAGITIS: ICD-10-CM

## 2021-05-12 DIAGNOSIS — R19.4 CHANGE IN BOWEL HABIT: Primary | ICD-10-CM

## 2021-05-12 PROCEDURE — 99214 OFFICE O/P EST MOD 30 MIN: CPT | Performed by: PHYSICIAN ASSISTANT

## 2021-05-12 RX ORDER — SODIUM, POTASSIUM,MAG SULFATES 17.5-3.13G
SOLUTION, RECONSTITUTED, ORAL ORAL
Qty: 354 ML | Refills: 0 | Status: SHIPPED | OUTPATIENT
Start: 2021-05-12 | End: 2021-06-10

## 2021-05-12 NOTE — PROGRESS NOTES
[facility] - Outpatient Follow-up Note  Naresh Barrera 61 y o  female MRN: [id number]  Encounter: [id number]          ASSESSMENT AND PLAN:      1  Change in bowel habit   patient with change in bowel habits and may be related to change in medications since she stopped narcotics which were likely causing constipation  Symptoms seem to be consistent with irritable bowel syndrome with diarrhea and we will plan for colonoscopy for evaluation since it has been 4 years,  Last colonoscopy in 2017 was unremarkable  Patient states that she recently had thyroid studies done and we will also add celiac panel and in the interim we have advised her to start a fiber supplement to help normalize bowel habits such as Benefiber    2  Gastroesophageal reflux disease without esophagitis   reflux symptoms are generally controlled and she did have an EGD last year  Which had shown some inflammation of the gastric pouch but otherwise no marginal ulcer and she will continue PPI or Carafate as needed but she is no longer needed to take these      The patient was seen and examined with Dr Zack Javed  ______________________________________________________________________    SUBJECTIVE:       this is a 72-year-old female who presents today for a follow-up office visit and has noticed a change in bowel habits  Patient was previously taking narcotics but had back surgery in November and she has not been taking any opiates on a regular basis and she states that loose stools have been waking her up about 4:00 a m  since January and she has multiple bowel movements postprandially and she does note that she has a history of lactose intolerance she had previously cut this out  She is status post gastric bypass and her GERD symptoms are controlled and she had an EGD last year by bariatric surgery which was unremarkable with no evidence of marginal ulcer    Patient has lost weight around the time of her surgery but then has now gained weight  She otherwise states that she gets some abdominal cramping around the time that she has to move her bowels and generally she is okay later in the day but sometimes will have some loose stools after dinner  Denies any blood in her stools and sometimes she states her stools are dark green in color  REVIEW OF SYSTEMS IS OTHERWISE NEGATIVE        Historical Information   Past Medical History:   Diagnosis Date    Anemia     Cancer ([address] )     Degenerative joint disease     Disease of thyroid gland     underactive    FHx: non-Hodgkin's lymphoma     macroglobulin anemia    History of back surgery     History of chemotherapy 2014    lymphoma - [id number]    Lumbar disc disease      Past Surgical History:   Procedure Laterality Date    ANKLE SURGERY      [id number],9207    BACK SURGERY  10/1996    BREAST BIOPSY Left 06/2012    benign    BUNIONECTOMY  08/18/2015    CARPAL TUNNEL RELEASE Left 03/1995    CARPAL TUNNEL RELEASE Right 12/1999    COLONOSCOPY      CT EPIDURAL STEROID INJECTION (TERRELL LUMBAR)  8/26/2016    DISCECTOMY SPINE CERVICAL ANTERIOR W/ ARTHROPLASTY      C3/C4    FOOT GANGLION EXCISION Left 10/27/2017    FOOT SURGERY  08/09/2011    ankle/foot sx    HEMORRHOID SURGERY  2009    KNEE CARTILAGE SURGERY Right     MOUTH SURGERY      ROTATOR CUFF REPAIR Left 05/14/2008    AIME-EN-Y PROCEDURE  03/31/2008    TOTAL SHOULDER REPLACEMENT  06/24/2014    UPPER GASTROINTESTINAL ENDOSCOPY       Social History   Social History     Substance and Sexual Activity   Alcohol Use Not Currently    Comment: liter nightly     Social History     Substance and Sexual Activity   Drug Use Yes    Types: Marijuana    Comment: Medical marijuana 03/01/2021     Social History     Tobacco Use   Smoking Status Former Smoker   Smokeless Tobacco Never Used     Family History   Problem Relation Age of Onset    Diabetes Mother     Hypertension Mother     Stroke Mother     Stroke Father     Leukemia Father     Thyroid disease Sister     Stroke Brother     Heart disease Brother        Meds/Allergies       Current Outpatient Medications:     ascorbic acid (VITAMIN C) 250 MG tablet    bepotastine besilate (BEPREVE) 1 5 % op soln    Calcium Carb-Ergocalciferol 250-125 MG-UNIT TABS    Cholecalciferol 100 MCG (4000 UT) CAPS    famotidine (PEPCID) 20 mg tablet    fexofenadine (ALLEGRA) 60 MG tablet    gabapentin (NEURONTIN) 300 mg capsule    hydrochlorothiazide (MICROZIDE) 12 5 mg capsule    Multiple Vitamins-Minerals (ONE DAILY WOMENS 50 PLUS PO)    Omega 3-6-9 Fatty Acids (OMEGA 3-6-9 COMPLEX) CAPS    pentoxifylline (TRENtal) 400 mg ER tablet    Potassium (POTASSIMIN) 75 MG TABS    sucralfate (CARAFATE) 1 g tablet    Aloe Vera 25 MG CAPS    cyclobenzaprine (FLEXERIL) 10 mg tablet    denosumab (PROLIA) 60 mg/mL    docusate sodium (COLACE) 100 mg capsule    levothyroxine 50 mcg tablet    lidocaine (LIDODERM) 5 %    linaCLOtide 145 MCG CAPS    lisinopril-hydrochlorothiazide (PRINZIDE,ZESTORETIC) 10-12 5 MG per tablet    Polyethylene Glycol 3350 (MIRALAX PO)    prochlorperazine (COMPAZINE) 10 mg tablet    traZODone (DESYREL) 100 mg tablet    Allergies   Allergen Reactions    Cefaclor Other (See Comments)    Ciprofloxacin     Sulfa Antibiotics GI Intolerance    Sulfamethoxazole-Trimethoprim Other (See Comments)    Talwin [Pentazocine] Dizziness    Vicodin [Hydrocodone-Acetaminophen] GI Intolerance    Ondansetron Rash           Objective     Blood pressure 120/69, pulse 83, height 5' 5" (1 651 m), weight 88 5 kg (195 lb)  Body mass index is 32 45 kg/m²        PHYSICAL EXAM:      General Appearance:   Alert, cooperative, no distress   HEENT:   Normocephalic, atraumatic, anicteric      Neck:  Supple, symmetrical, trachea midline   Lungs:   Clear to auscultation bilaterally; no rales, rhonchi or wheezing; respirations unlabored    Heart[de-identified]   Regular rate and rhythm; no murmur, rub, or gallop  Abdomen:   Soft, non-tender, non-distended; normal bowel sounds; no masses, no organomegaly    Genitalia:   Deferred    Rectal:   Deferred    Extremities:  No cyanosis, clubbing or edema    Pulses:  2+ and symmetric    Skin:  No jaundice, rashes, or lesions    Lymph nodes:  No palpable cervical lymphadenopathy        Lab Results:   egd- feb 2020-  DATE OF SERVICE:  3/04/20     PHYSICIAN(S):   Ransom Lanes, MD - Attending Physician    - Fellow     INDICATION:  Status post bariatric surgery     POST-OP DIAGNOSIS:  See the impression below      PREPROCEDURE:  Informed consent was obtained for the procedure, including sedation  Risks of perforation, hemorrhage, adverse drug reaction and aspiration were discussed  The patient was placed in the left lateral decubitus position      Patient was explained about the risks and benefits of the procedure  Risks including but not limited to bleeding, infection, and perforation were explained in detail  Also explained about less than 100% sensitivity with the exam and other alternatives      DETAILS OF PROCEDURE:  The patient underwent deep sedation, which was administered by an anesthesia professional  The patient's blood pressure, heart rate, level of consciousness, respirations and oxygen were monitored throughout the procedure  The scope was introducedthrough the mouth and advanced to the second part of the duodenum  Retroflexion was performed in the fundus  The patient experienced no blood loss  The procedure was not difficult  The patient tolerated the procedure well  There were no apparentcomplications       ANESTHESIA INFORMATION:  ASA: II  Anesthesia Type: Anesthesia type not filed in the log      FINDINGS:  · Normal  · Edematous and erythematous mucosa in the stomach   Inflammation of the gastric pouch  · Signs of previous gastric bypass surgery        SPECIMENS:  ID Type Source Tests Collected by Time Destination   1 : pouch bx, r/o h  pylori Tissue Stomach TISSUE EXAM Brittany Eugene MD 3/4/2020 1121           IMPRESSION:  Inflammation of the gastric pouch  No evidence of marginal ulceration  No evidence of hiatal hernia   Status post laparoscopic Gia-en-Y gastric bypass with small pouch     RECOMMENDATION:  Follow-up in the office

## 2021-05-12 NOTE — H&P (VIEW-ONLY)
[facility] - Outpatient Follow-up Note  James Buck 61 y o  female MRN: [id number]  Encounter: [id number]          ASSESSMENT AND PLAN:      1  Change in bowel habit   patient with change in bowel habits and may be related to change in medications since she stopped narcotics which were likely causing constipation  Symptoms seem to be consistent with irritable bowel syndrome with diarrhea and we will plan for colonoscopy for evaluation since it has been 4 years,  Last colonoscopy in 2017 was unremarkable  Patient states that she recently had thyroid studies done and we will also add celiac panel and in the interim we have advised her to start a fiber supplement to help normalize bowel habits such as Benefiber    2  Gastroesophageal reflux disease without esophagitis   reflux symptoms are generally controlled and she did have an EGD last year  Which had shown some inflammation of the gastric pouch but otherwise no marginal ulcer and she will continue PPI or Carafate as needed but she is no longer needed to take these      The patient was seen and examined with Dr Xiang Currie  ______________________________________________________________________    SUBJECTIVE:       this is a 44-year-old female who presents today for a follow-up office visit and has noticed a change in bowel habits  Patient was previously taking narcotics but had back surgery in November and she has not been taking any opiates on a regular basis and she states that loose stools have been waking her up about 4:00 a m  since January and she has multiple bowel movements postprandially and she does note that she has a history of lactose intolerance she had previously cut this out  She is status post gastric bypass and her GERD symptoms are controlled and she had an EGD last year by bariatric surgery which was unremarkable with no evidence of marginal ulcer    Patient has lost weight around the time of her surgery but then has now gained weight  She otherwise states that she gets some abdominal cramping around the time that she has to move her bowels and generally she is okay later in the day but sometimes will have some loose stools after dinner  Denies any blood in her stools and sometimes she states her stools are dark green in color  REVIEW OF SYSTEMS IS OTHERWISE NEGATIVE        Historical Information   Past Medical History:   Diagnosis Date    Anemia     Cancer ([address] )     Degenerative joint disease     Disease of thyroid gland     underactive    FHx: non-Hodgkin's lymphoma     macroglobulin anemia    History of back surgery     History of chemotherapy 2014    lymphoma - [id number]    Lumbar disc disease      Past Surgical History:   Procedure Laterality Date    ANKLE SURGERY      [id number]    BACK SURGERY  10/1996    BREAST BIOPSY Left 06/2012    benign    BUNIONECTOMY  08/18/2015    CARPAL TUNNEL RELEASE Left 03/1995    CARPAL TUNNEL RELEASE Right 12/1999    COLONOSCOPY      CT EPIDURAL STEROID INJECTION (TERRELL LUMBAR)  8/26/2016    DISCECTOMY SPINE CERVICAL ANTERIOR W/ ARTHROPLASTY      C3/C4    FOOT GANGLION EXCISION Left 10/27/2017    FOOT SURGERY  08/09/2011    ankle/foot sx    HEMORRHOID SURGERY  2009    KNEE CARTILAGE SURGERY Right     MOUTH SURGERY      ROTATOR CUFF REPAIR Left 05/14/2008    AIME-EN-Y PROCEDURE  03/31/2008    TOTAL SHOULDER REPLACEMENT  06/24/2014    UPPER GASTROINTESTINAL ENDOSCOPY       Social History   Social History     Substance and Sexual Activity   Alcohol Use Not Currently    Comment: liter nightly     Social History     Substance and Sexual Activity   Drug Use Yes    Types: Marijuana    Comment: Medical marijuana 03/01/2021     Social History     Tobacco Use   Smoking Status Former Smoker   Smokeless Tobacco Never Used     Family History   Problem Relation Age of Onset    Diabetes Mother     Hypertension Mother     Stroke Mother     Stroke Father     Leukemia Father     Thyroid disease Sister     Stroke Brother     Heart disease Brother        Meds/Allergies       Current Outpatient Medications:     ascorbic acid (VITAMIN C) 250 MG tablet    bepotastine besilate (BEPREVE) 1 5 % op soln    Calcium Carb-Ergocalciferol 250-125 MG-UNIT TABS    Cholecalciferol 100 MCG (4000 UT) CAPS    famotidine (PEPCID) 20 mg tablet    fexofenadine (ALLEGRA) 60 MG tablet    gabapentin (NEURONTIN) 300 mg capsule    hydrochlorothiazide (MICROZIDE) 12 5 mg capsule    Multiple Vitamins-Minerals (ONE DAILY WOMENS 50 PLUS PO)    Omega 3-6-9 Fatty Acids (OMEGA 3-6-9 COMPLEX) CAPS    pentoxifylline (TRENtal) 400 mg ER tablet    Potassium (POTASSIMIN) 75 MG TABS    sucralfate (CARAFATE) 1 g tablet    Aloe Vera 25 MG CAPS    cyclobenzaprine (FLEXERIL) 10 mg tablet    denosumab (PROLIA) 60 mg/mL    docusate sodium (COLACE) 100 mg capsule    levothyroxine 50 mcg tablet    lidocaine (LIDODERM) 5 %    linaCLOtide 145 MCG CAPS    lisinopril-hydrochlorothiazide (PRINZIDE,ZESTORETIC) 10-12 5 MG per tablet    Polyethylene Glycol 3350 (MIRALAX PO)    prochlorperazine (COMPAZINE) 10 mg tablet    traZODone (DESYREL) 100 mg tablet    Allergies   Allergen Reactions    Cefaclor Other (See Comments)    Ciprofloxacin     Sulfa Antibiotics GI Intolerance    Sulfamethoxazole-Trimethoprim Other (See Comments)    Talwin [Pentazocine] Dizziness    Vicodin [Hydrocodone-Acetaminophen] GI Intolerance    Ondansetron Rash           Objective     Blood pressure 120/69, pulse 83, height 5' 5" (1 651 m), weight 88 5 kg (195 lb)  Body mass index is 32 45 kg/m²        PHYSICAL EXAM:      General Appearance:   Alert, cooperative, no distress   HEENT:   Normocephalic, atraumatic, anicteric      Neck:  Supple, symmetrical, trachea midline   Lungs:   Clear to auscultation bilaterally; no rales, rhonchi or wheezing; respirations unlabored    Heart[de-identified]   Regular rate and rhythm; no murmur, rub, or gallop  Abdomen:   Soft, non-tender, non-distended; normal bowel sounds; no masses, no organomegaly    Genitalia:   Deferred    Rectal:   Deferred    Extremities:  No cyanosis, clubbing or edema    Pulses:  2+ and symmetric    Skin:  No jaundice, rashes, or lesions    Lymph nodes:  No palpable cervical lymphadenopathy        Lab Results:   egd- feb 2020-  DATE OF SERVICE:  3/04/20     PHYSICIAN(S):   Crow Zimmerman MD - Attending Physician    - Fellow     INDICATION:  Status post bariatric surgery     POST-OP DIAGNOSIS:  See the impression below      PREPROCEDURE:  Informed consent was obtained for the procedure, including sedation  Risks of perforation, hemorrhage, adverse drug reaction and aspiration were discussed  The patient was placed in the left lateral decubitus position      Patient was explained about the risks and benefits of the procedure  Risks including but not limited to bleeding, infection, and perforation were explained in detail  Also explained about less than 100% sensitivity with the exam and other alternatives      DETAILS OF PROCEDURE:  The patient underwent deep sedation, which was administered by an anesthesia professional  The patient's blood pressure, heart rate, level of consciousness, respirations and oxygen were monitored throughout the procedure  The scope was introducedthrough the mouth and advanced to the second part of the duodenum  Retroflexion was performed in the fundus  The patient experienced no blood loss  The procedure was not difficult  The patient tolerated the procedure well  There were no apparentcomplications       ANESTHESIA INFORMATION:  ASA: II  Anesthesia Type: Anesthesia type not filed in the log      FINDINGS:  · Normal  · Edematous and erythematous mucosa in the stomach   Inflammation of the gastric pouch  · Signs of previous gastric bypass surgery        SPECIMENS:  ID Type Source Tests Collected by Time Destination   1 : pouch bx, r/o h  pylori Tissue Stomach TISSUE EXAM Juana Oseguera MD 3/4/2020 1121           IMPRESSION:  Inflammation of the gastric pouch  No evidence of marginal ulceration  No evidence of hiatal hernia   Status post laparoscopic Gia-en-Y gastric bypass with small pouch     RECOMMENDATION:  Follow-up in the office

## 2021-06-01 ENCOUNTER — TELEPHONE (OUTPATIENT)
Dept: GASTROENTEROLOGY | Facility: CLINIC | Age: 66
End: 2021-06-01

## 2021-06-01 DIAGNOSIS — R19.7 DIARRHEA, UNSPECIFIED TYPE: Primary | ICD-10-CM

## 2021-06-01 NOTE — TELEPHONE ENCOUNTER
Pt called with complaint of continued diarrhea  She would like a call back to discuss further  She does have a colonoscopy scheduled

## 2021-06-01 NOTE — TELEPHONE ENCOUNTER
Spoke to patient on phone  She reports her diarrhea has been ongoing since January  Patient reports she moves her bowels 3 to 4 times a day  Denies blood in stool  Patient reports that she was on antibiotics in November due to dental infections  Patient also reports she has been on and off antibiotics for 3 1/2 months since January due to recurrent UTI  Patient reports she never had her stool checked for an underlying infection  Will order stool for C diff, ova and parasite, and stool for bacterial panel  Patient is already scheduled for a colonoscopy to be done 6/10  Informed patient to continue taking fiber supplement   Will notify patient when results of stool studies are available  Thank you

## 2021-06-07 LAB — EXT SARS-COV-2: NOT DETECTED

## 2021-06-08 ENCOUNTER — TRANSCRIBE ORDERS (OUTPATIENT)
Dept: LAB | Facility: HOSPITAL | Age: 66
End: 2021-06-08

## 2021-06-09 ENCOUNTER — TELEPHONE (OUTPATIENT)
Dept: GASTROENTEROLOGY | Facility: CLINIC | Age: 66
End: 2021-06-09

## 2021-06-10 ENCOUNTER — HOSPITAL ENCOUNTER (OUTPATIENT)
Dept: GASTROENTEROLOGY | Facility: AMBULATORY SURGERY CENTER | Age: 66
Discharge: HOME | End: 2021-06-10
Payer: COMMERCIAL

## 2021-06-10 ENCOUNTER — ANESTHESIA EVENT (OUTPATIENT)
Dept: GASTROENTEROLOGY | Facility: AMBULATORY SURGERY CENTER | Age: 66
End: 2021-06-10

## 2021-06-10 ENCOUNTER — ANESTHESIA (OUTPATIENT)
Dept: GASTROENTEROLOGY | Facility: AMBULATORY SURGERY CENTER | Age: 66
End: 2021-06-10

## 2021-06-10 VITALS
OXYGEN SATURATION: 97 % | DIASTOLIC BLOOD PRESSURE: 70 MMHG | TEMPERATURE: 95.7 F | SYSTOLIC BLOOD PRESSURE: 106 MMHG | BODY MASS INDEX: 32.49 KG/M2 | HEIGHT: 65 IN | RESPIRATION RATE: 18 BRPM | WEIGHT: 195 LBS | HEART RATE: 72 BPM

## 2021-06-10 DIAGNOSIS — R19.4 CHANGE IN BOWEL HABIT: ICD-10-CM

## 2021-06-10 PROCEDURE — 45378 DIAGNOSTIC COLONOSCOPY: CPT | Performed by: INTERNAL MEDICINE

## 2021-06-10 PROCEDURE — 00811 ANES LWR INTST NDSC NOS: CPT | Performed by: NURSE ANESTHETIST, CERTIFIED REGISTERED

## 2021-06-10 RX ORDER — SODIUM CHLORIDE 9 MG/ML
INJECTION, SOLUTION INTRAVENOUS CONTINUOUS PRN
Status: DISCONTINUED | OUTPATIENT
Start: 2021-06-10 | End: 2021-06-10

## 2021-06-10 RX ORDER — SODIUM CHLORIDE 9 MG/ML
20 INJECTION, SOLUTION INTRAVENOUS CONTINUOUS
Status: DISCONTINUED | OUTPATIENT
Start: 2021-06-10 | End: 2021-06-14 | Stop reason: HOSPADM

## 2021-06-10 RX ORDER — SODIUM CHLORIDE 9 MG/ML
30 INJECTION, SOLUTION INTRAVENOUS CONTINUOUS
Status: DISCONTINUED | OUTPATIENT
Start: 2021-06-10 | End: 2021-06-14 | Stop reason: HOSPADM

## 2021-06-10 RX ORDER — PROPOFOL 10 MG/ML
INJECTION, EMULSION INTRAVENOUS AS NEEDED
Status: DISCONTINUED | OUTPATIENT
Start: 2021-06-10 | End: 2021-06-10

## 2021-06-10 RX ADMIN — PROPOFOL 50 MG: 10 INJECTION, EMULSION INTRAVENOUS at 13:29

## 2021-06-10 RX ADMIN — SODIUM CHLORIDE: 9 INJECTION, SOLUTION INTRAVENOUS at 12:59

## 2021-06-10 RX ADMIN — PROPOFOL 50 MG: 10 INJECTION, EMULSION INTRAVENOUS at 13:20

## 2021-06-10 RX ADMIN — PROPOFOL 50 MG: 10 INJECTION, EMULSION INTRAVENOUS at 13:15

## 2021-06-10 RX ADMIN — PROPOFOL 50 MG: 10 INJECTION, EMULSION INTRAVENOUS at 13:18

## 2021-06-10 RX ADMIN — PROPOFOL 50 MG: 10 INJECTION, EMULSION INTRAVENOUS at 13:26

## 2021-06-10 RX ADMIN — PROPOFOL 100 MG: 10 INJECTION, EMULSION INTRAVENOUS at 13:14

## 2021-06-10 RX ADMIN — PROPOFOL 50 MG: 10 INJECTION, EMULSION INTRAVENOUS at 13:22

## 2021-06-10 NOTE — INTERVAL H&P NOTE
H&P reviewed  After examining the patient I find no changes in the patients condition since the H&P had been written      Vitals:    06/10/21 1252   BP: 98/66   Pulse: 80   Resp: 18   Temp: (!) 95 7 °F (35 4 °C)   SpO2: 96%

## 2021-06-10 NOTE — ANESTHESIA POSTPROCEDURE EVALUATION
Post-Op Assessment Note    CV Status:  Stable  Pain Score: 0    Pain management: adequate     Mental Status:  Alert and awake   Hydration Status:  Euvolemic   PONV Controlled:  Controlled   Airway Patency:  Patent      Post Op Vitals Reviewed: Yes      Staff: CRNA   Comments: vss        No complications documented      BP      Temp     Pulse     Resp      SpO2

## 2021-06-10 NOTE — ANESTHESIA PREPROCEDURE EVALUATION
Procedure:  COLONOSCOPY    Relevant Problems   CARDIO   (+) Atypical chest pain      ENDO   (+) Hypothyroid      GI/HEPATIC   (+) Bariatric surgery status   (+) Gastroesophageal reflux disease without esophagitis      HEMATOLOGY   (+) Macrocytic anemia      MUSCULOSKELETAL   (+) Low back pain   (+) Scoliosis of thoracolumbar spine      NEURO/PSYCH   (+) History of non-Hodgkin's lymphoma        Physical Exam    Airway    Mallampati score: I  TM Distance: >3 FB  Neck ROM: full     Dental   No notable dental hx     Cardiovascular  Rhythm: regular, Rate: normal, Cardiovascular exam normal    Pulmonary  Pulmonary exam normal     Other Findings        Anesthesia Plan  ASA Score- 2     Anesthesia Type- IV sedation with anesthesia with ASA Monitors  Additional Monitors:   Airway Plan:           Plan Factors-Exercise tolerance (METS): >4 METS  Chart reviewed  EKG reviewed  Induction- intravenous  Postoperative Plan-     Informed Consent- Anesthetic plan and risks discussed with patient

## 2021-06-30 ENCOUNTER — APPOINTMENT (OUTPATIENT)
Dept: LAB | Facility: HOSPITAL | Age: 66
End: 2021-06-30
Payer: COMMERCIAL

## 2021-06-30 DIAGNOSIS — R19.7 DIARRHEA, UNSPECIFIED TYPE: ICD-10-CM

## 2021-06-30 PROCEDURE — 87177 OVA AND PARASITES SMEARS: CPT

## 2021-06-30 PROCEDURE — 87209 SMEAR COMPLEX STAIN: CPT

## 2021-06-30 PROCEDURE — 87505 NFCT AGENT DETECTION GI: CPT

## 2021-07-01 LAB
C DIFF TOX GENS STL QL NAA+PROBE: NEGATIVE
CAMPYLOBACTER DNA SPEC NAA+PROBE: NORMAL
SALMONELLA DNA SPEC QL NAA+PROBE: NORMAL
SHIGA TOXIN STX GENE SPEC NAA+PROBE: NORMAL
SHIGELLA DNA SPEC QL NAA+PROBE: NORMAL

## 2021-07-05 LAB
G LAMBLIA AG STL QL IA: NEGATIVE
O+P STL CONC: NORMAL

## 2021-07-09 ENCOUNTER — TELEPHONE (OUTPATIENT)
Dept: GASTROENTEROLOGY | Facility: CLINIC | Age: 66
End: 2021-07-09

## 2021-07-09 NOTE — TELEPHONE ENCOUNTER
Pt elizabeth asking for a call back regarding her bx results and informed still having diarrhea  Please call her back at [phone]  Thank you!

## 2021-07-09 NOTE — TELEPHONE ENCOUNTER
Patient where the biopsy was negative for microscopic colitis  Will treat was Xifaxan 550 mg t i d   For 14 days

## 2021-07-23 ENCOUNTER — HOSPITAL ENCOUNTER (OUTPATIENT)
Dept: RADIOLOGY | Facility: HOSPITAL | Age: 66
Discharge: HOME | End: 2021-07-23
Payer: COMMERCIAL

## 2021-07-23 DIAGNOSIS — Z98.1 ARTHRODESIS STATUS: ICD-10-CM

## 2021-07-23 PROCEDURE — 72110 X-RAY EXAM L-2 SPINE 4/>VWS: CPT

## 2021-07-30 ENCOUNTER — TELEPHONE (OUTPATIENT)
Dept: GASTROENTEROLOGY | Facility: CLINIC | Age: 66
End: 2021-07-30

## 2021-07-30 NOTE — TELEPHONE ENCOUNTER
Please inform patient that we would not expect to see a significant improvement after only 3 days of the medication  It is ordered for a 14 day course  She should try to complete the course, stay hydrated, and we could trial low dose bentyl as well to see if this helps with the diarrhea  I will order this to her pharmacy  She can use this as needed up to twice daily for diarrhea, side effects can include dizziness, drowsiness, or constipation if taken in excess

## 2021-07-30 NOTE — TELEPHONE ENCOUNTER
Patient called  She wanted to give an update  She states she is on day 3 of xifaxan  She said she is still having severe diarrhea  3-4x a day before noon and then another 2x later in the day  She feels weak after having a bm  please advise   Thank you

## 2021-08-13 ENCOUNTER — TELEPHONE (OUTPATIENT)
Dept: GASTROENTEROLOGY | Facility: CLINIC | Age: 66
End: 2021-08-13

## 2021-08-13 NOTE — TELEPHONE ENCOUNTER
Pt reports she continues to have 6 loose stools a day and has weakness  She finished the Xifaxan and is taking the bentyl with no improvement  She would like a call back to discuss

## 2021-08-13 NOTE — TELEPHONE ENCOUNTER
Called and spoke to pt  She would like sooner appt as she does not want to wait until the end of Sept to be seen bc she has ongoing diarrhea despite xifaxan tx  I sent in colestipol for her to see if this helps; she said she is willing to try it out until she is seen in the office  Can you please schedule the pt for a sooner visit with someone at that office, if possible? Thanks!

## 2021-08-16 ENCOUNTER — TELEPHONE (OUTPATIENT)
Dept: GASTROENTEROLOGY | Facility: CLINIC | Age: 66
End: 2021-08-16

## 2021-08-16 NOTE — TELEPHONE ENCOUNTER
Pt called with concerns about the paper work for montelukast  that she receive with her prescription for colestid which was ordered on Friday  She states she did not start the medication yet  I spoke with the pharmacist at [facility] who informed me colestid was dispensed and that pt should bring the prescription  bottle and paper work back to the pharmacy for them to assure pt received the correct medication  Pt instructed to take it back to the pharmacy  She verbalized understanding

## 2021-08-17 NOTE — TELEPHONE ENCOUNTER
Called pt and offered her two cx tomorrow with Dr Tolentino Corporal 8:40 or 9:40am   Pt said she could not do  Will continue to watch for other cxs

## 2021-08-31 NOTE — TELEPHONE ENCOUNTER
I lmom for pt to please call back as there are a few cx with Dr Rosemary Regalado in [place] office  Will wait for pt's call back at this time

## 2021-09-13 ENCOUNTER — OFFICE VISIT (OUTPATIENT)
Dept: GASTROENTEROLOGY | Facility: CLINIC | Age: 66
End: 2021-09-13
Payer: COMMERCIAL

## 2021-09-13 VITALS
WEIGHT: 215 LBS | HEART RATE: 79 BPM | SYSTOLIC BLOOD PRESSURE: 110 MMHG | HEIGHT: 65 IN | DIASTOLIC BLOOD PRESSURE: 70 MMHG | BODY MASS INDEX: 35.82 KG/M2

## 2021-09-13 DIAGNOSIS — D47.3 ESSENTIAL (HEMORRHAGIC) THROMBOCYTHEMIA (HCC): ICD-10-CM

## 2021-09-13 DIAGNOSIS — K59.1 FUNCTIONAL DIARRHEA: Primary | ICD-10-CM

## 2021-09-13 PROCEDURE — 99214 OFFICE O/P EST MOD 30 MIN: CPT | Performed by: PHYSICIAN ASSISTANT

## 2021-09-13 NOTE — PROGRESS NOTES
[facility] - Outpatient Follow-up Note  Leonel Cruz 61 y o  female MRN: [id number]  Encounter: [id number]          ASSESSMENT AND PLAN:      1  Functional diarrhea  This is a 61-year-old female with diarrhea which could be functional in nature as it seems to primarily occur during the day  Random biopsies of the colon were negative for microscopic colitis  Plan for stool for fecal elastase due to oily stools  Patient has had no significant weight loss  If pancreatic elastase is within normal limits and colestipol is ineffective then we may try Viberzi  Currently we will increase the colestipol to 2 g up to 3 times per day but I have advised her to start morning and night  Patient is having upcoming knee surgery and we will see her in 3 months for a follow-up     ______________________________________________________________________    SUBJECTIVE:    This is a 61-year-old female who presents today for a follow-up  The patient was having diarrhea  Patient was previously taking narcotics but had back surgery in November and she has not been taking any opiates on a regular basis and she states that loose stools have been waking her up about 4:00 a m  since January and she has multiple bowel movements postprandially and she does note that she has a history of lactose intolerance she had previously eliminated this from diet  Underwent colonoscopy with biopsies  Random biopsies were ordered for microscopic colitis  There was no significant histopathologic change on biopsies  Patient reports weight gain  She was treated with Xifaxan and she was also treated with Bentyl and colestipol  Stool studies were also negative  Patient states that the colestipol seems to be slowing things down but she has been taking this as needed  Patient states that she has no significant abdominal pain occasionally gets a feeling of a baby kicking but otherwise no significant pain    She does notice some oily stools and she states that her friend had pancreatic insufficiency and she was questioning this and she was drinking alcohol of several glasses of wine per day but she is cutting back on that  REVIEW OF SYSTEMS IS OTHERWISE NEGATIVE        Historical Information   Past Medical History:   Diagnosis Date    Anemia     Cancer ([address] )     Degenerative joint disease     Disease of thyroid gland     underactive    FHx: non-Hodgkin's lymphoma     macroglobulin anemia    History of back surgery     History of chemotherapy 2014    lymphoma - [id number]    Lumbar disc disease      Past Surgical History:   Procedure Laterality Date    ANKLE SURGERY      [id number]    BACK SURGERY  10/1996    BREAST BIOPSY Left 06/2012    benign    BUNIONECTOMY  08/18/2015    CARPAL TUNNEL RELEASE Left 03/1995    CARPAL TUNNEL RELEASE Right 12/1999    COLONOSCOPY      CT EPIDURAL STEROID INJECTION (TERRELL LUMBAR)  8/26/2016    DISCECTOMY SPINE CERVICAL ANTERIOR W/ ARTHROPLASTY      C3/C4    FOOT GANGLION EXCISION Left 10/27/2017    FOOT SURGERY  08/09/2011    ankle/foot sx    HEMORRHOID SURGERY  2009    KNEE CARTILAGE SURGERY Right     MOUTH SURGERY      ROTATOR CUFF REPAIR Left 05/14/2008    AIME-EN-Y PROCEDURE  03/31/2008    TOTAL SHOULDER REPLACEMENT  06/24/2014    UPPER GASTROINTESTINAL ENDOSCOPY       Social History   Social History     Substance and Sexual Activity   Alcohol Use Not Currently    Comment: liter nightly     Social History     Substance and Sexual Activity   Drug Use Yes    Types: Marijuana    Comment: Medical marijuana 03/01/2021     Social History     Tobacco Use   Smoking Status Former Smoker   Smokeless Tobacco Never Used     Family History   Problem Relation Age of Onset    Diabetes Mother     Hypertension Mother     Stroke Mother     Stroke Father     Leukemia Father     Thyroid disease Sister     Stroke Brother     Heart disease Brother        Meds/Allergies Current Outpatient Medications:     ascorbic acid (VITAMIN C) 250 MG tablet    bepotastine besilate (BEPREVE) 1 5 % op soln    Calcium Carb-Ergocalciferol 250-125 MG-UNIT TABS    Cholecalciferol 100 MCG (4000 UT) CAPS    colestipol (COLESTID) 1 g tablet    famotidine (PEPCID) 20 mg tablet    fexofenadine (ALLEGRA) 60 MG tablet    gabapentin (NEURONTIN) 300 mg capsule    hydrochlorothiazide (MICROZIDE) 12 5 mg capsule    lisinopril-hydrochlorothiazide (PRINZIDE,ZESTORETIC) 10-12 5 MG per tablet    Multiple Vitamins-Minerals (ONE DAILY WOMENS 50 PLUS PO)    Omega 3-6-9 Fatty Acids (OMEGA 3-6-9 COMPLEX) CAPS    Potassium (POTASSIMIN) 75 MG TABS    Aloe Vera 25 MG CAPS    cyclobenzaprine (FLEXERIL) 10 mg tablet    denosumab (PROLIA) 60 mg/mL    dicyclomine (BENTYL) 10 mg capsule    docusate sodium (COLACE) 100 mg capsule    levothyroxine 50 mcg tablet    lidocaine (LIDODERM) 5 %    linaCLOtide 145 MCG CAPS    pentoxifylline (TRENtal) 400 mg ER tablet    Polyethylene Glycol 3350 (MIRALAX PO)    prochlorperazine (COMPAZINE) 10 mg tablet    Allergies   Allergen Reactions    Cefaclor Other (See Comments)    Ciprofloxacin     Sulfa Antibiotics GI Intolerance    Sulfamethoxazole-Trimethoprim Other (See Comments)    Talwin [Pentazocine] Dizziness    Vicodin [Hydrocodone-Acetaminophen] GI Intolerance    Ondansetron Rash           Objective     There were no vitals taken for this visit  There is no height or weight on file to calculate BMI  PHYSICAL EXAM:      General Appearance:   Alert, cooperative, no distress   HEENT:   Normocephalic, atraumatic, anicteric      Neck:  Supple, symmetrical, trachea midline   Lungs:   Clear to auscultation bilaterally; no rales, rhonchi or wheezing; respirations unlabored    Heart[de-identified]   Regular rate and rhythm; no murmur, rub, or gallop     Abdomen:   Soft, non-tender, non-distended; normal bowel sounds; no masses, no organomegaly    Genitalia:   Deferred  Rectal:   Deferred    Extremities:  No cyanosis, clubbing or edema    Pulses:  2+ and symmetric    Skin:  No jaundice, rashes, or lesions    Lymph nodes:  No palpable cervical lymphadenopathy        Lab Results:   No visits with results within 1 Day(s) from this visit  Latest known visit with results is:   Appointment on 06/30/2021   Component Date Value    C difficile toxin by PCR 06/30/2021 Negative     Salmonella sp PCR 06/30/2021 None Detected     Shigella sp/Enteroinvasi* 06/30/2021 None Detected     Campylobacter sp (jejuni* 06/30/2021 None Detected     Shiga toxin 1/Shiga toxi* 06/30/2021 None Detected     Ova + Parasite Exam 06/30/2021 No ova, cysts, or parasites seen     One negative specimen does not rule out the possibility of a  parasitic infection   Giardia Ag, Stl 06/30/2021 Negative          Radiology Results:   No results found

## 2021-09-19 ENCOUNTER — APPOINTMENT (EMERGENCY)
Dept: RADIOLOGY | Facility: HOSPITAL | Age: 66
DRG: 919 | End: 2021-09-19
Payer: COMMERCIAL

## 2021-09-19 ENCOUNTER — HOSPITAL ENCOUNTER (INPATIENT)
Facility: HOSPITAL | Age: 66
LOS: 1 days | Discharge: HOME | DRG: 919 | End: 2021-09-21
Attending: EMERGENCY MEDICINE | Admitting: FAMILY MEDICINE
Payer: COMMERCIAL

## 2021-09-19 DIAGNOSIS — R50.9 FEVER: ICD-10-CM

## 2021-09-19 DIAGNOSIS — M79.661 PAIN AND SWELLING OF RIGHT LOWER LEG: ICD-10-CM

## 2021-09-19 DIAGNOSIS — I26.99 ACUTE PULMONARY EMBOLISM WITHOUT ACUTE COR PULMONALE (HCC): ICD-10-CM

## 2021-09-19 DIAGNOSIS — I26.99 PULMONARY EMBOLISM (HCC): ICD-10-CM

## 2021-09-19 DIAGNOSIS — Z96.651 STATUS POST RIGHT KNEE REPLACEMENT: ICD-10-CM

## 2021-09-19 DIAGNOSIS — M79.89 PAIN AND SWELLING OF RIGHT LOWER LEG: ICD-10-CM

## 2021-09-19 DIAGNOSIS — L03.90 CELLULITIS: Primary | ICD-10-CM

## 2021-09-19 DIAGNOSIS — A41.9 SEPSIS (HCC): ICD-10-CM

## 2021-09-19 LAB
ALBUMIN SERPL BCP-MCNC: 3.4 G/DL (ref 3.5–5)
ALP SERPL-CCNC: 98 U/L (ref 46–116)
ALT SERPL W P-5'-P-CCNC: 28 U/L (ref 12–78)
ANION GAP SERPL CALCULATED.3IONS-SCNC: 10 MMOL/L (ref 4–13)
APTT PPP: 26 SECONDS (ref 23–37)
AST SERPL W P-5'-P-CCNC: 28 U/L (ref 5–45)
BASOPHILS # BLD AUTO: 0.07 THOUSANDS/ΜL (ref 0–0.1)
BASOPHILS NFR BLD AUTO: 1 % (ref 0–1)
BILIRUB SERPL-MCNC: 0.79 MG/DL (ref 0.2–1)
BILIRUB UR QL STRIP: NEGATIVE
BUN SERPL-MCNC: 17 MG/DL (ref 5–25)
CALCIUM ALBUM COR SERPL-MCNC: 9.6 MG/DL (ref 8.3–10.1)
CALCIUM SERPL-MCNC: 9.1 MG/DL (ref 8.3–10.1)
CHLORIDE SERPL-SCNC: 97 MMOL/L (ref 100–108)
CLARITY UR: CLEAR
CO2 SERPL-SCNC: 29 MMOL/L (ref 21–32)
COLOR UR: YELLOW
CREAT SERPL-MCNC: 1.34 MG/DL (ref 0.6–1.3)
EOSINOPHIL # BLD AUTO: 0.83 THOUSAND/ΜL (ref 0–0.61)
EOSINOPHIL NFR BLD AUTO: 9 % (ref 0–6)
ERYTHROCYTE [DISTWIDTH] IN BLOOD BY AUTOMATED COUNT: 12.7 % (ref 11.6–15.1)
GFR SERPL CREATININE-BSD FRML MDRD: 42 ML/MIN/1.73SQ M
GLUCOSE SERPL-MCNC: 107 MG/DL (ref 65–140)
GLUCOSE UR STRIP-MCNC: NEGATIVE MG/DL
HCT VFR BLD AUTO: 30.6 % (ref 34.8–46.1)
HGB BLD-MCNC: 9.8 G/DL (ref 11.5–15.4)
HGB UR QL STRIP.AUTO: NEGATIVE
IMM GRANULOCYTES # BLD AUTO: 0.02 THOUSAND/UL (ref 0–0.2)
IMM GRANULOCYTES NFR BLD AUTO: 0 % (ref 0–2)
INR PPP: 0.93 (ref 0.84–1.19)
KETONES UR STRIP-MCNC: NEGATIVE MG/DL
LACTATE SERPL-SCNC: 1.1 MMOL/L (ref 0.5–2)
LEUKOCYTE ESTERASE UR QL STRIP: NEGATIVE
LYMPHOCYTES # BLD AUTO: 1.26 THOUSANDS/ΜL (ref 0.6–4.47)
LYMPHOCYTES NFR BLD AUTO: 14 % (ref 14–44)
MCH RBC QN AUTO: 35.4 PG (ref 26.8–34.3)
MCHC RBC AUTO-ENTMCNC: 32 G/DL (ref 31.4–37.4)
MCV RBC AUTO: 111 FL (ref 82–98)
MONOCYTES # BLD AUTO: 0.98 THOUSAND/ΜL (ref 0.17–1.22)
MONOCYTES NFR BLD AUTO: 11 % (ref 4–12)
NEUTROPHILS # BLD AUTO: 5.95 THOUSANDS/ΜL (ref 1.85–7.62)
NEUTS SEG NFR BLD AUTO: 65 % (ref 43–75)
NITRITE UR QL STRIP: NEGATIVE
NRBC BLD AUTO-RTO: 0 /100 WBCS
PH UR STRIP.AUTO: 6 [PH]
PLATELET # BLD AUTO: 366 THOUSANDS/UL (ref 149–390)
PMV BLD AUTO: 10 FL (ref 8.9–12.7)
POTASSIUM SERPL-SCNC: 4.2 MMOL/L (ref 3.5–5.3)
PROT SERPL-MCNC: 7.2 G/DL (ref 6.4–8.2)
PROT UR STRIP-MCNC: NEGATIVE MG/DL
PROTHROMBIN TIME: 12.3 SECONDS (ref 11.6–14.5)
RBC # BLD AUTO: 2.77 MILLION/UL (ref 3.81–5.12)
SARS-COV-2 RNA RESP QL NAA+PROBE: NEGATIVE
SODIUM SERPL-SCNC: 136 MMOL/L (ref 136–145)
SP GR UR STRIP.AUTO: <=1.005 (ref 1–1.03)
TROPONIN I SERPL-MCNC: <0.02 NG/ML
UROBILINOGEN UR QL STRIP.AUTO: 0.2 E.U./DL
WBC # BLD AUTO: 9.11 THOUSAND/UL (ref 4.31–10.16)

## 2021-09-19 PROCEDURE — G1004 CDSM NDSC: HCPCS

## 2021-09-19 PROCEDURE — 93005 ELECTROCARDIOGRAM TRACING: CPT

## 2021-09-19 PROCEDURE — 87086 URINE CULTURE/COLONY COUNT: CPT | Performed by: EMERGENCY MEDICINE

## 2021-09-19 PROCEDURE — 85025 COMPLETE CBC W/AUTO DIFF WBC: CPT | Performed by: EMERGENCY MEDICINE

## 2021-09-19 PROCEDURE — 36415 COLL VENOUS BLD VENIPUNCTURE: CPT | Performed by: EMERGENCY MEDICINE

## 2021-09-19 PROCEDURE — U0005 INFEC AGEN DETEC AMPLI PROBE: HCPCS | Performed by: EMERGENCY MEDICINE

## 2021-09-19 PROCEDURE — 99285 EMERGENCY DEPT VISIT HI MDM: CPT

## 2021-09-19 PROCEDURE — 96365 THER/PROPH/DIAG IV INF INIT: CPT

## 2021-09-19 PROCEDURE — 80053 COMPREHEN METABOLIC PANEL: CPT | Performed by: EMERGENCY MEDICINE

## 2021-09-19 PROCEDURE — 71275 CT ANGIOGRAPHY CHEST: CPT

## 2021-09-19 PROCEDURE — 96372 THER/PROPH/DIAG INJ SC/IM: CPT

## 2021-09-19 PROCEDURE — 71045 X-RAY EXAM CHEST 1 VIEW: CPT

## 2021-09-19 PROCEDURE — U0003 INFECTIOUS AGENT DETECTION BY NUCLEIC ACID (DNA OR RNA); SEVERE ACUTE RESPIRATORY SYNDROME CORONAVIRUS 2 (SARS-COV-2) (CORONAVIRUS DISEASE [COVID-19]), AMPLIFIED PROBE TECHNIQUE, MAKING USE OF HIGH THROUGHPUT TECHNOLOGIES AS DESCRIBED BY CMS-2020-01-R: HCPCS | Performed by: EMERGENCY MEDICINE

## 2021-09-19 PROCEDURE — 84484 ASSAY OF TROPONIN QUANT: CPT | Performed by: EMERGENCY MEDICINE

## 2021-09-19 PROCEDURE — 84145 PROCALCITONIN (PCT): CPT | Performed by: EMERGENCY MEDICINE

## 2021-09-19 PROCEDURE — 81003 URINALYSIS AUTO W/O SCOPE: CPT | Performed by: EMERGENCY MEDICINE

## 2021-09-19 PROCEDURE — 87040 BLOOD CULTURE FOR BACTERIA: CPT | Performed by: EMERGENCY MEDICINE

## 2021-09-19 PROCEDURE — 96361 HYDRATE IV INFUSION ADD-ON: CPT

## 2021-09-19 PROCEDURE — 85610 PROTHROMBIN TIME: CPT | Performed by: EMERGENCY MEDICINE

## 2021-09-19 PROCEDURE — 83605 ASSAY OF LACTIC ACID: CPT | Performed by: EMERGENCY MEDICINE

## 2021-09-19 PROCEDURE — 85730 THROMBOPLASTIN TIME PARTIAL: CPT | Performed by: EMERGENCY MEDICINE

## 2021-09-19 RX ORDER — OXYCODONE HYDROCHLORIDE 5 MG/1
5 TABLET ORAL ONCE
Status: COMPLETED | OUTPATIENT
Start: 2021-09-19 | End: 2021-09-19

## 2021-09-19 RX ORDER — CEFTRIAXONE 1 G/50ML
1000 INJECTION, SOLUTION INTRAVENOUS ONCE
Status: COMPLETED | OUTPATIENT
Start: 2021-09-19 | End: 2021-09-19

## 2021-09-19 RX ORDER — ACETAMINOPHEN 325 MG/1
650 TABLET ORAL ONCE
Status: COMPLETED | OUTPATIENT
Start: 2021-09-19 | End: 2021-09-19

## 2021-09-19 RX ORDER — ACETAMINOPHEN 500 MG
1000 TABLET ORAL EVERY 4 HOURS PRN
Status: ON HOLD | COMMUNITY
End: 2021-09-21 | Stop reason: SDUPTHER

## 2021-09-19 RX ADMIN — ENOXAPARIN SODIUM 100 MG: 100 INJECTION SUBCUTANEOUS at 23:38

## 2021-09-19 RX ADMIN — IOHEXOL 85 ML: 350 INJECTION, SOLUTION INTRAVENOUS at 22:36

## 2021-09-19 RX ADMIN — ACETAMINOPHEN 650 MG: 325 TABLET, FILM COATED ORAL at 20:39

## 2021-09-19 RX ADMIN — CEFTRIAXONE 1000 MG: 1 INJECTION, SOLUTION INTRAVENOUS at 21:32

## 2021-09-19 RX ADMIN — OXYCODONE HYDROCHLORIDE 5 MG: 5 TABLET ORAL at 21:32

## 2021-09-19 RX ADMIN — SODIUM CHLORIDE 1000 ML: 0.9 INJECTION, SOLUTION INTRAVENOUS at 20:45

## 2021-09-20 ENCOUNTER — APPOINTMENT (INPATIENT)
Dept: RADIOLOGY | Facility: HOSPITAL | Age: 66
DRG: 919 | End: 2021-09-20
Payer: COMMERCIAL

## 2021-09-20 ENCOUNTER — APPOINTMENT (INPATIENT)
Dept: NON INVASIVE DIAGNOSTICS | Facility: HOSPITAL | Age: 66
DRG: 919 | End: 2021-09-20
Payer: COMMERCIAL

## 2021-09-20 PROBLEM — M79.661 PAIN AND SWELLING OF RIGHT LOWER LEG: Status: ACTIVE | Noted: 2021-09-20

## 2021-09-20 PROBLEM — R65.10 SIRS (SYSTEMIC INFLAMMATORY RESPONSE SYNDROME) (HCC): Status: ACTIVE | Noted: 2021-09-20

## 2021-09-20 PROBLEM — Z96.651 STATUS POST RIGHT KNEE REPLACEMENT: Status: ACTIVE | Noted: 2021-09-20

## 2021-09-20 PROBLEM — I10 ESSENTIAL HYPERTENSION: Status: ACTIVE | Noted: 2021-09-20

## 2021-09-20 PROBLEM — M79.89 PAIN AND SWELLING OF RIGHT LOWER LEG: Status: ACTIVE | Noted: 2021-09-20

## 2021-09-20 PROBLEM — I26.99 ACUTE PULMONARY EMBOLISM WITHOUT ACUTE COR PULMONALE (HCC): Status: ACTIVE | Noted: 2021-09-20

## 2021-09-20 PROBLEM — N17.9 AKI (ACUTE KIDNEY INJURY) (HCC): Status: ACTIVE | Noted: 2021-09-20

## 2021-09-20 LAB
ANION GAP SERPL CALCULATED.3IONS-SCNC: 7 MMOL/L (ref 4–13)
BUN SERPL-MCNC: 13 MG/DL (ref 5–25)
CALCIUM SERPL-MCNC: 8.1 MG/DL (ref 8.3–10.1)
CHLORIDE SERPL-SCNC: 106 MMOL/L (ref 100–108)
CO2 SERPL-SCNC: 28 MMOL/L (ref 21–32)
CREAT SERPL-MCNC: 0.91 MG/DL (ref 0.6–1.3)
ERYTHROCYTE [DISTWIDTH] IN BLOOD BY AUTOMATED COUNT: 12.7 % (ref 11.6–15.1)
GFR SERPL CREATININE-BSD FRML MDRD: 67 ML/MIN/1.73SQ M
GLUCOSE SERPL-MCNC: 98 MG/DL (ref 65–140)
HCT VFR BLD AUTO: 24.8 % (ref 34.8–46.1)
HGB BLD-MCNC: 8 G/DL (ref 11.5–15.4)
MAGNESIUM SERPL-MCNC: 1.9 MG/DL (ref 1.6–2.6)
MCH RBC QN AUTO: 35.2 PG (ref 26.8–34.3)
MCHC RBC AUTO-ENTMCNC: 32.3 G/DL (ref 31.4–37.4)
MCV RBC AUTO: 109 FL (ref 82–98)
PLATELET # BLD AUTO: 292 THOUSANDS/UL (ref 149–390)
PMV BLD AUTO: 9 FL (ref 8.9–12.7)
POTASSIUM SERPL-SCNC: 3.8 MMOL/L (ref 3.5–5.3)
PROCALCITONIN SERPL-MCNC: 0.05 NG/ML
PROCALCITONIN SERPL-MCNC: <0.05 NG/ML
RBC # BLD AUTO: 2.27 MILLION/UL (ref 3.81–5.12)
SODIUM SERPL-SCNC: 141 MMOL/L (ref 136–145)
WBC # BLD AUTO: 6.31 THOUSAND/UL (ref 4.31–10.16)

## 2021-09-20 PROCEDURE — 99223 1ST HOSP IP/OBS HIGH 75: CPT | Performed by: FAMILY MEDICINE

## 2021-09-20 PROCEDURE — 93971 EXTREMITY STUDY: CPT

## 2021-09-20 PROCEDURE — 99222 1ST HOSP IP/OBS MODERATE 55: CPT | Performed by: ORTHOPAEDIC SURGERY

## 2021-09-20 PROCEDURE — 93306 TTE W/DOPPLER COMPLETE: CPT | Performed by: INTERNAL MEDICINE

## 2021-09-20 PROCEDURE — 83735 ASSAY OF MAGNESIUM: CPT | Performed by: FAMILY MEDICINE

## 2021-09-20 PROCEDURE — 80048 BASIC METABOLIC PNL TOTAL CA: CPT | Performed by: FAMILY MEDICINE

## 2021-09-20 PROCEDURE — 93971 EXTREMITY STUDY: CPT | Performed by: SURGERY

## 2021-09-20 PROCEDURE — 84145 PROCALCITONIN (PCT): CPT | Performed by: FAMILY MEDICINE

## 2021-09-20 PROCEDURE — 93306 TTE W/DOPPLER COMPLETE: CPT

## 2021-09-20 PROCEDURE — 85027 COMPLETE CBC AUTOMATED: CPT | Performed by: FAMILY MEDICINE

## 2021-09-20 RX ORDER — CEFTRIAXONE 1 G/50ML
1000 INJECTION, SOLUTION INTRAVENOUS EVERY 24 HOURS
Status: DISCONTINUED | OUTPATIENT
Start: 2021-09-20 | End: 2021-09-21

## 2021-09-20 RX ORDER — POLYETHYLENE GLYCOL 3350 17 G/17G
17 POWDER, FOR SOLUTION ORAL DAILY PRN
Status: DISCONTINUED | OUTPATIENT
Start: 2021-09-20 | End: 2021-09-21 | Stop reason: HOSPADM

## 2021-09-20 RX ORDER — ACETAMINOPHEN 325 MG/1
650 TABLET ORAL EVERY 6 HOURS PRN
Status: DISCONTINUED | OUTPATIENT
Start: 2021-09-20 | End: 2021-09-21 | Stop reason: HOSPADM

## 2021-09-20 RX ORDER — MORPHINE SULFATE 4 MG/ML
4 INJECTION, SOLUTION INTRAMUSCULAR; INTRAVENOUS EVERY 6 HOURS PRN
Status: DISCONTINUED | OUTPATIENT
Start: 2021-09-20 | End: 2021-09-21 | Stop reason: HOSPADM

## 2021-09-20 RX ORDER — OXYCODONE HYDROCHLORIDE 5 MG/1
5 TABLET ORAL EVERY 4 HOURS PRN
Status: DISCONTINUED | OUTPATIENT
Start: 2021-09-20 | End: 2021-09-20

## 2021-09-20 RX ORDER — SODIUM CHLORIDE 9 MG/ML
75 INJECTION, SOLUTION INTRAVENOUS CONTINUOUS
Status: DISCONTINUED | OUTPATIENT
Start: 2021-09-20 | End: 2021-09-20

## 2021-09-20 RX ORDER — CYCLOBENZAPRINE HCL 5 MG
5 TABLET ORAL 3 TIMES DAILY PRN
Status: DISCONTINUED | OUTPATIENT
Start: 2021-09-20 | End: 2021-09-21 | Stop reason: HOSPADM

## 2021-09-20 RX ORDER — B-COMPLEX WITH VITAMIN C
1 TABLET ORAL
Status: DISCONTINUED | OUTPATIENT
Start: 2021-09-20 | End: 2021-09-21 | Stop reason: HOSPADM

## 2021-09-20 RX ORDER — GABAPENTIN 300 MG/1
600 CAPSULE ORAL 3 TIMES DAILY
Status: DISCONTINUED | OUTPATIENT
Start: 2021-09-20 | End: 2021-09-21 | Stop reason: HOSPADM

## 2021-09-20 RX ORDER — OXYCODONE HYDROCHLORIDE 5 MG/1
5 TABLET ORAL EVERY 6 HOURS PRN
Status: DISCONTINUED | OUTPATIENT
Start: 2021-09-20 | End: 2021-09-21 | Stop reason: HOSPADM

## 2021-09-20 RX ORDER — OXYCODONE HYDROCHLORIDE 10 MG/1
10 TABLET ORAL EVERY 4 HOURS PRN
Status: DISCONTINUED | OUTPATIENT
Start: 2021-09-20 | End: 2021-09-21 | Stop reason: HOSPADM

## 2021-09-20 RX ORDER — DOCUSATE SODIUM 100 MG/1
100 CAPSULE, LIQUID FILLED ORAL 2 TIMES DAILY
Status: DISCONTINUED | OUTPATIENT
Start: 2021-09-20 | End: 2021-09-21 | Stop reason: HOSPADM

## 2021-09-20 RX ORDER — MELATONIN
3000 DAILY
Status: DISCONTINUED | OUTPATIENT
Start: 2021-09-20 | End: 2021-09-21 | Stop reason: HOSPADM

## 2021-09-20 RX ORDER — LEVOTHYROXINE SODIUM 0.05 MG/1
50 TABLET ORAL
Status: DISCONTINUED | OUTPATIENT
Start: 2021-09-20 | End: 2021-09-21 | Stop reason: HOSPADM

## 2021-09-20 RX ADMIN — Medication 3000 UNITS: at 09:40

## 2021-09-20 RX ADMIN — ENOXAPARIN SODIUM 100 MG: 100 INJECTION SUBCUTANEOUS at 11:52

## 2021-09-20 RX ADMIN — OXYCODONE HYDROCHLORIDE 10 MG: 10 TABLET ORAL at 16:58

## 2021-09-20 RX ADMIN — OXYCODONE HYDROCHLORIDE 5 MG: 5 TABLET ORAL at 11:51

## 2021-09-20 RX ADMIN — GABAPENTIN 600 MG: 300 CAPSULE ORAL at 09:40

## 2021-09-20 RX ADMIN — Medication 1 TABLET: at 09:40

## 2021-09-20 RX ADMIN — DOCUSATE SODIUM 100 MG: 100 CAPSULE, LIQUID FILLED ORAL at 09:40

## 2021-09-20 RX ADMIN — ACETAMINOPHEN 650 MG: 325 TABLET, FILM COATED ORAL at 14:17

## 2021-09-20 RX ADMIN — GABAPENTIN 600 MG: 300 CAPSULE ORAL at 15:28

## 2021-09-20 RX ADMIN — CEFTRIAXONE 1000 MG: 1 INJECTION, SOLUTION INTRAVENOUS at 20:06

## 2021-09-20 RX ADMIN — OXYCODONE HYDROCHLORIDE 10 MG: 10 TABLET ORAL at 21:54

## 2021-09-20 RX ADMIN — Medication 1 TABLET: at 09:49

## 2021-09-20 RX ADMIN — ENOXAPARIN SODIUM 100 MG: 100 INJECTION SUBCUTANEOUS at 23:22

## 2021-09-20 RX ADMIN — LEVOTHYROXINE SODIUM 50 MCG: 50 TABLET ORAL at 05:47

## 2021-09-20 RX ADMIN — SODIUM CHLORIDE 75 ML/HR: 0.9 INJECTION, SOLUTION INTRAVENOUS at 02:31

## 2021-09-20 RX ADMIN — DOCUSATE SODIUM 100 MG: 100 CAPSULE, LIQUID FILLED ORAL at 17:00

## 2021-09-20 RX ADMIN — OXYCODONE HYDROCHLORIDE 5 MG: 5 TABLET ORAL at 02:41

## 2021-09-20 RX ADMIN — OXYCODONE HYDROCHLORIDE 5 MG: 5 TABLET ORAL at 09:49

## 2021-09-20 RX ADMIN — GABAPENTIN 600 MG: 300 CAPSULE ORAL at 21:54

## 2021-09-20 NOTE — ASSESSMENT & PLAN NOTE
Patient with pain, redness and swelling of right lower leg  Possibly due to right total knee arthroplasty on 09/15  vascular duplex of right lower extremity showed acute DVT in mid peroneal veins and soleal vein

## 2021-09-20 NOTE — H&P
History and Physical - [facility] Internal Medicine    Patient Information: Christy Mckinley 61 y o  female MRN: [id number]  Unit/Bed#: ED 08 Encounter: [id number]  Admitting Physician: Ousmane Santos DO  PCP: James Arita MD  Date of Admission:  9/19/21        Hospital Problem List:     Principal Problem:    Acute pulmonary embolism without acute cor pulmonale (HCC)  Active Problems:    SIRS (systemic inflammatory response syndrome) ([facility])    Pain and swelling of right lower leg    JN (acute kidney injury) ([facility] [address] )    Hypothyroid    Status post right knee replacement    Essential hypertension      Assessment/Plan:    * Acute pulmonary embolism without acute cor pulmonale ([facility] )  Assessment & Plan  Patient presented to the ER with complaints of right lower leg redness, swelling, pain and also shortness of breath  On CTA patient noted to have findings of acute on chronic PE  No obvious signs of right heart strain on CTA but will obtain echo for further evaluation  Patient received a dose of weight based Lovenox in the ER which will be continued for now    SIRS (systemic inflammatory response syndrome) ([facility] [address] )  Assessment & Plan  As noted by fever, tachycardia due to PE and possible DVT  Patient does have right lower extremity swelling, erythema but doubtful that this is due to cellulitis and most likely due to DVT  Patient did receive a dose of IV Rocephin in the ER which will be continued for now till blood cultures finalize and DVT is ruled out  Procalcitonin pending  Repeat procalcitonin in a m  Pain and swelling of right lower leg  Assessment & Plan  Patient with pain, redness and swelling of right lower leg, which is likely due to DVT  Less likely cellulitis  Obtain vascular duplex of right lower extremity    JN (acute kidney injury) ([facility] [address] )  Assessment & Plan  Creatinine of 1 34 today   Patient does report good fluid intake at home  Recent lab work from 09/16 shows creatinine of 0 79  Likely pre renal in nature  hydrate with IVF and get repeat lab work in a m  Check PVR    Essential hypertension  Assessment & Plan  Patient on Prinzide and hydrochlorothiazide separately which will be on hold due to [place]  Monitor BPs    Status post right knee replacement  Assessment & Plan  Patient is status post right total knee replacement on 9/15 through [facility]  States she was discharged on 81 mg of aspirin daily which she has been compliant with  Follow-up with primary orthopedist after discharge  Patient states that she is currently weight-bearing as tolerated and ambulates with a walker  Continue Tylenol and oxycodone p r n  Hypothyroid  Assessment & Plan  Continue levothyroxine          VTE Prophylaxis: Enoxaparin (Lovenox)  / reason for no mechanical VTE prophylaxis Possible DVT   Code Status: Level 1 - Full Code    Anticipated Length of Stay:  Patient will be admitted on an Inpatient basis with an anticipated length of stay of atleast 2 midnights  Justification for Hospital Stay:  Acute on chronic PE, SIRS, right lower extremity swelling-rule out DVT    Total Time for Visit, including Counseling / Coordination of Care: 45 minutes  Greater than 50% of this total time spent on direct patient counseling and coordination of care  Chief Complaint:     Post-Op Infection (had R knee replacement four days ago  today started with increased pain, and redness to leg and running fever  last tylenol about noon)    History of Present Illness:    Jessica Robertson is a 61 y o  female who presents with Right lower leg redness, swelling and pain that started on 09/19  Patient also reported a temp of 102° at home and then shortness of breath that started later in the day  Patient is status post right total knee replacement through [facility] on 9/15  Reports compliance with baby aspirin    Patient reports pain behind her right knee and states that she has a Baker cyst but also reports a tightness feeling in her right lower leg    Review of Systems:    Review of Systems   Constitutional: Positive for chills and fever  Negative for appetite change  HENT: Negative for congestion  Eyes: Negative for photophobia  Respiratory: Positive for shortness of breath  Negative for chest tightness  Cardiovascular: Positive for leg swelling  Negative for chest pain  Gastrointestinal: Positive for constipation  Negative for abdominal pain, blood in stool, nausea and vomiting  Genitourinary: Negative for dysuria, frequency and hematuria  Musculoskeletal: Positive for neck pain  (+) right knee/lower leg pain   Skin: Positive for color change  Neurological: Negative for dizziness and light-headedness  Hematological: Does not bruise/bleed easily  Psychiatric/Behavioral: Negative for agitation         Past Medical and Surgical History:     Past Medical History:   Diagnosis Date    Anemia     Cancer [facility] [address] )     Degenerative joint disease     Disease of thyroid gland     underactive    FHx: non-Hodgkin's lymphoma     macroglobulin anemia    History of back surgery     History of chemotherapy 2014    lymphoma - [id number]    Lumbar disc disease        Past Surgical History:   Procedure Laterality Date    ANKLE SURGERY      [id number]    BACK SURGERY  10/1996    BREAST BIOPSY Left 06/2012    benign    BUNIONECTOMY  08/18/2015    CARPAL TUNNEL RELEASE Left 03/1995    CARPAL TUNNEL RELEASE Right 12/1999    COLONOSCOPY      CT EPIDURAL STEROID INJECTION (TERRELL LUMBAR)  8/26/2016    DISCECTOMY SPINE CERVICAL ANTERIOR W/ ARTHROPLASTY      C3/C4    FOOT GANGLION EXCISION Left 10/27/2017    FOOT SURGERY  08/09/2011    ankle/foot sx    HEMORRHOID SURGERY  2009    JOINT REPLACEMENT      KNEE CARTILAGE SURGERY Right     MOUTH SURGERY      ROTATOR CUFF REPAIR Left 05/14/2008    AIME-EN-Y PROCEDURE  03/31/2008    TOTAL SHOULDER REPLACEMENT  06/24/2014    UPPER GASTROINTESTINAL ENDOSCOPY Meds/Allergies:    PTA meds:  REviewed with patient      Allergies: Allergies   Allergen Reactions    Cefaclor Other (See Comments)    Ciprofloxacin     Sulfa Antibiotics GI Intolerance    Sulfamethoxazole-Trimethoprim Other (See Comments)    Talwin [Pentazocine] Dizziness    Vicodin [Hydrocodone-Acetaminophen] GI Intolerance    Ondansetron Rash     History:     Marital Status: /Civil Union     Substance Use History:   Social History     Substance and Sexual Activity   Alcohol Use Yes    Comment: liter of wine a night  none since about 5 days     Social History     Tobacco Use   Smoking Status Former Smoker   Smokeless Tobacco Never Used     Social History     Substance and Sexual Activity   Drug Use Yes    Types: Marijuana    Comment: Medical marijuana 03/01/2021       Family History:    Family History   Problem Relation Age of Onset    Diabetes Mother     Hypertension Mother     Stroke Mother     Stroke Father     Leukemia Father     Thyroid disease Sister     Stroke Brother     Heart disease Brother        Physical Exam:     Vitals:   Blood Pressure: 109/57 (09/19/21 2339)  Pulse: 93 (09/19/21 2339)  Temperature: 99 7 °F (37 6 °C) (09/19/21 2339)  Temp Source: Oral (09/19/21 2339)  Respirations: 18 (09/19/21 2339)  SpO2: 95 % (09/19/21 2339)    Physical Exam  Constitutional:       General: She is not in acute distress  Appearance: She is not diaphoretic  HENT:      Head: Normocephalic and atraumatic  Eyes:      General:         Right eye: No discharge  Left eye: No discharge  Cardiovascular:      Rate and Rhythm: Normal rate and regular rhythm  Pulmonary:      Effort: Pulmonary effort is normal  No respiratory distress  Breath sounds: Normal breath sounds  No wheezing or rales  Abdominal:      General: Bowel sounds are normal  There is no distension  Palpations: Abdomen is soft  Tenderness: There is no abdominal tenderness     Musculoskeletal: Comments: Right lower extremity with edema, erythema, warmth  Mild Right calf tenderness noted with palpation  Right knee with dressing in place anteriorly along with some bruising around the knee   Neurological:      Mental Status: She is alert and oriented to person, place, and time  Lab Results: I have personally reviewed pertinent reports  Results from last 7 days   Lab Units 09/19/21 2041   WBC Thousand/uL 9 11   HEMOGLOBIN g/dL 9 8*   HEMATOCRIT % 30 6*   PLATELETS Thousands/uL 366   NEUTROS PCT % 65   LYMPHS PCT % 14   MONOS PCT % 11   EOS PCT % 9*     Results from last 7 days   Lab Units 09/19/21 2041   POTASSIUM mmol/L 4 2   CHLORIDE mmol/L 97*   CO2 mmol/L 29   BUN mg/dL 17   CREATININE mg/dL 1 34*   CALCIUM mg/dL 9 1   ALK PHOS U/L 98   ALT U/L 28   AST U/L 28     Results from last 7 days   Lab Units 09/19/21 2041   INR  0 93       Imaging: I have personally reviewed pertinent reports  CTA ED chest PE study    Result Date: 9/19/2021  Narrative: CTA - CHEST WITH IV CONTRAST - PULMONARY ANGIOGRAM INDICATION:   Shortness of breath PE suspected, high pretest prob sob, post-op  COMPARISON: 11/11/2020  TECHNIQUE: CTA examination of the chest was performed using angiographic technique according to a protocol specifically tailored to evaluate for pulmonary embolism  Axial, sagittal, and coronal 2D reformatted images were created from the source data and  submitted for interpretation  In addition, coronal 3D MIP postprocessing was performed on the acquisition scanner  Radiation dose length product (DLP) for this visit:  405 95 mGy-cm   This examination, like all CT scans performed in the [facility], was performed utilizing techniques to minimize radiation dose exposure, including the use of iterative  reconstruction and automated exposure control   IV Contrast:  85 mL of iohexol (OMNIPAQUE)  FINDINGS: PULMONARY ARTERIAL TREE:  There is new intraluminal filling defect noted within the right superior pulmonary and upper lobe segmental branch (2:64-66, 601:76), in the region of previously described eccentric filling defect suspicious for chronic pulmonary embolism  Previously noted eccentric filling defect in left inferior pulmonary artery is not well appreciated on current study  LUNGS:  No consolidation or suspicious pulmonary nodule  There is no tracheal or endobronchial lesion  PLEURA:  Unremarkable  HEART/GREAT VESSELS:  Unremarkable for patient's age  MEDIASTINUM AND SIS:  Unremarkable  CHEST WALL AND LOWER NECK:   Unremarkable  VISUALIZED STRUCTURES IN THE UPPER ABDOMEN:  Unremarkable  OSSEOUS STRUCTURES:  No acute fracture or destructive osseous lesion  Partially visualized thoracolumbar fusion hardware again noted  Impression: Findings as above suspicious for acute on chronic pulmonary embolus involving the right superior pulmonary artery and segmental branch  No bowing of the intraventricular septum or reflux of contrast into the hepatic veins/IVC to suggest associated right  heart strain  No consolidation or effusion  Discussed with Dr Jessi Mendoza at 57:15 PM on 5/81/6982 with verbal confirmation by the recipient  Workstation performed: [id number]       CTA ED chest PE study   Final Result      Findings as above suspicious for acute on chronic pulmonary embolus involving the right superior pulmonary artery and segmental branch  No bowing of the intraventricular septum or reflux of contrast into the hepatic veins/IVC to suggest associated right    heart strain  No consolidation or effusion  Discussed with Dr Jessi Mendoza at 99:04 PM on 7/66/3416 with verbal confirmation by the recipient        Workstation performed: [id number]         XR chest 1 view portable   ED Interpretation   Poor inspiration; no definite infiltrate; atelectasis left          EKG, Pathology, and Other Studies Reviewed on Admission:   · EKG shows sinus tachycardia with no acute ST elevations    Allscripts/[facility] Records Reviewed: Yes     Please Note: "This note has been constructed using a voice recognition system  Therefore there may be syntax, spelling, and/or grammatical errors   Please call if you have any questions  "

## 2021-09-20 NOTE — ASSESSMENT & PLAN NOTE
As noted by fever, tachycardia due to PE and DVT  Patient presented with right lower extremity swelling, erythema but doubtful that this is due to cellulitis and most likely due to DVT  Patient did receive doses of IV Rocephin while here till blood cultures finalized and it was negative  Procalcitonin neg X 2

## 2021-09-20 NOTE — ASSESSMENT & PLAN NOTE
Patient presented to the ER with complaints of right lower leg redness, swelling, pain and also shortness of breath  On CTA patient noted to have findings of acute on chronic PE  echo showed normal EF with grade 1 diastolic dysfunction and no evidence of right heart strain  She was started on weight based Lovenox q 12 hours  Discussed with Dr Zbigniew Young and as patient is recently postop, will continue with Lovenox on discharge    Lovenox teaching done prior to discharge  Discussed with patient to follow up with her primary orthopedic doctor as soon as possible after discharge and patient to discuss with Dr Xander Correia to see when it is okay to stop the Lovenox and start Eliquis instead

## 2021-09-20 NOTE — PLAN OF CARE
Problem: MOBILITY - ADULT  Goal: Maintain or return to baseline ADL function  Description: INTERVENTIONS:  -  Assess patient's ability to carry out ADLs; assess patient's baseline for ADL function and identify physical deficits which impact ability to perform ADLs (bathing, care of mouth/teeth, toileting, grooming, dressing, etc )  - Assess/evaluate cause of self-care deficits   - Assess range of motion  - Assess patient's mobility; develop plan if impaired  - Assess patient's need for assistive devices and provide as appropriate  - Encourage maximum independence but intervene and supervise when necessary  - Involve family in performance of ADLs  - Assess for home care needs following discharge   - Consider OT consult to assist with ADL evaluation and planning for discharge  - Provide patient education as appropriate  Outcome: Progressing  Goal: Maintains/Returns to pre admission functional level  Description: INTERVENTIONS:  - Perform BMAT or MOVE assessment daily    - Set and communicate daily mobility goal to care team and patient/family/caregiver  - Collaborate with rehabilitation services on mobility goals if consulted  - Perform Range of Motion 4 times a day  - Reposition patient every 2 hours    - Dangle patient 3 times a day  - Stand patient 3 times a day  - Ambulate patient 3 times a day  - Out of bed to chair 3 times a day   - Out of bed for meals 3 times a day  - Out of bed for toileting  - Record patient progress and toleration of activity level   Outcome: Progressing     Problem: Potential for Falls  Goal: Patient will remain free of falls  Description: INTERVENTIONS:  - Educate patient/family on patient safety including physical limitations  - Instruct patient to call for assistance with activity   - Consult OT/PT to assist with strengthening/mobility   - Keep Call bell within reach  - Keep bed low and locked with side rails adjusted as appropriate  - Keep care items and personal belongings within reach  - Initiate and maintain comfort rounds  - Make Fall Risk Sign visible to staff  - Offer Toileting every 2 Hours, in advance of need  - Initiate/Maintain bed alarm  - Obtain necessary fall risk management equipment: bed alarm  - Apply yellow socks and bracelet for high fall risk patients  - Consider moving patient to room near nurses station  Outcome: Progressing     Problem: PAIN - ADULT  Goal: Verbalizes/displays adequate comfort level or baseline comfort level  Description: Interventions:  - Encourage patient to monitor pain and request assistance  - Assess pain using appropriate pain scale  - Administer analgesics based on type and severity of pain and evaluate response  - Implement non-pharmacological measures as appropriate and evaluate response  - Consider cultural and social influences on pain and pain management  - Notify physician/advanced practitioner if interventions unsuccessful or patient reports new pain  Outcome: Progressing     Problem: INFECTION - ADULT  Goal: Absence or prevention of progression during hospitalization  Description: INTERVENTIONS:  - Assess and monitor for signs and symptoms of infection  - Monitor lab/diagnostic results  - Monitor all insertion sites, i e  indwelling lines, tubes, and drains  - Monitor endotracheal if appropriate and nasal secretions for changes in amount and color  - Monson appropriate cooling/warming therapies per order  - Administer medications as ordered  - Instruct and encourage patient and family to use good hand hygiene technique  - Identify and instruct in appropriate isolation precautions for identified infection/condition  Outcome: Progressing     Problem: SAFETY ADULT  Goal: Maintain or return to baseline ADL function  Description: INTERVENTIONS:  -  Assess patient's ability to carry out ADLs; assess patient's baseline for ADL function and identify physical deficits which impact ability to perform ADLs (bathing, care of mouth/teeth, toileting, grooming, dressing, etc )  - Assess/evaluate cause of self-care deficits   - Assess range of motion  - Assess patient's mobility; develop plan if impaired  - Assess patient's need for assistive devices and provide as appropriate  - Encourage maximum independence but intervene and supervise when necessary  - Involve family in performance of ADLs  - Assess for home care needs following discharge   - Consider OT consult to assist with ADL evaluation and planning for discharge  - Provide patient education as appropriate  Outcome: Progressing  Goal: Maintains/Returns to pre admission functional level  Description: INTERVENTIONS:  - Perform BMAT or MOVE assessment daily    - Set and communicate daily mobility goal to care team and patient/family/caregiver  - Collaborate with rehabilitation services on mobility goals if consulted  - Perform Range of Motion 4 times a day  - Reposition patient every 2 hours    - Dangle patient 3 times a day  - Stand patient 3 times a day  - Ambulate patient 3 times a day  - Out of bed to chair 3 times a day   - Out of bed for meals 3 times a day  - Out of bed for toileting  - Record patient progress and toleration of activity level   Outcome: Progressing  Goal: Patient will remain free of falls  Description: INTERVENTIONS:  - Educate patient/family on patient safety including physical limitations  - Instruct patient to call for assistance with activity   - Consult OT/PT to assist with strengthening/mobility   - Keep Call bell within reach  - Keep bed low and locked with side rails adjusted as appropriate  - Keep care items and personal belongings within reach  - Initiate and maintain comfort rounds  - Make Fall Risk Sign visible to staff  - Offer Toileting every 2 Hours, in advance of need  - Initiate/Maintain bed alarm  - Obtain necessary fall risk management equipment: bed alarm  - Apply yellow socks and bracelet for high fall risk patients  - Consider moving patient to room near nurses station  Outcome: Progressing     Problem: DISCHARGE PLANNING  Goal: Discharge to home or other facility with appropriate resources  Description: INTERVENTIONS:  - Identify barriers to discharge w/patient and caregiver  - Arrange for needed discharge resources and transportation as appropriate  - Identify discharge learning needs (meds, wound care, etc )  - Arrange for interpretive services to assist at discharge as needed  - Refer to Case Management Department for coordinating discharge planning if the patient needs post-hospital services based on physician/advanced practitioner order or complex needs related to functional status, cognitive ability, or social support system  Outcome: Progressing     Problem: Knowledge Deficit  Goal: Patient/family/caregiver demonstrates understanding of disease process, treatment plan, medications, and discharge instructions  Description: Complete learning assessment and assess knowledge base    Interventions:  - Provide teaching at level of understanding  - Provide teaching via preferred learning methods  Outcome: Progressing

## 2021-09-20 NOTE — ASSESSMENT & PLAN NOTE
Creatinine of 1 34 on admission and has improved to 0 91 today  Patient does report good fluid intake at home  Recent lab work from 09/16 shows creatinine of 0 79  Likely pre renal in nature    She was hydrated with IVF

## 2021-09-20 NOTE — PROGRESS NOTES
RLE elevated  Dressing change to right knee with Mepilex Border Ag dressing per Dr Darin Pearson request  Patient tolerated well

## 2021-09-20 NOTE — ASSESSMENT & PLAN NOTE
Patient is status post right total knee replacement on 9/15 through [facility]  States she was discharged on 81 mg of aspirin daily which she has been compliant with  Follow-up with primary orthopedist after discharge as soon as possible after discharge  Her right knee dressing was changed while here  Patient states that she is currently weight-bearing as tolerated and ambulates with a walker  Continue Tylenol and oxycodone p r n

## 2021-09-20 NOTE — ASSESSMENT & PLAN NOTE
Patient on Prinzide and hydrochlorothiazide separately which was on hold due to [place]  Advised to discontinue HCTZ that she takes separately and continue with Prinzide for now   Monitor BPs at home and follow up with PCP

## 2021-09-20 NOTE — CONSULTS
Consultation - Orthopedics   Alirio Reeder 61 y o  female MRN: [id number]  Unit/Bed#: [address] [place] [address] Encounter: [id number]      Assessment/Plan     Assessment:  1) acute on chronic pulmonary emboli  2) acute DVT right lower extremity  3) status post right total knee arthroplasty 9/15/2021 with Dr Cony Church:  Patient seen examined at bedside  At this point her right lower extremity complaints are due to multiple acute DVTs in the right lower extremity status post right total knee arthroplasty  Patient was recommended to take aspirin 81 mg p o  Q day and states that she had been compliant with this since her surgery  I do not appreciate any signs of acute postoperative infection or cellulitis at this point  T-max on admission was 102  Temperature today was 99 3  She is without leukocytosis  Fever increasing pain and swelling in the right lower extremity consistent with DVT and PE rather than infection currently  No purulent drainage or redness around the incision on inspection  Treatment for acute on chronic PE/acute right lower extremity DVT as per the medical team- patient advised that therapeutic anticoagulation this early the postoperative course may increase the chance of hemarthrosis/hematoma  Risk benefit ratio favors treatment of PE/DVT    Elevate right lower extremity at rest  Analgesia p r n  Weightbear as tolerated right lower extremity when symptoms improve  P T /OT-weightbear as tolerated right lower extremity  Dressing changed at bedside by nursing today  Dressing may stay in place 7 days  Patient to follow-up with Dr Shaw Ghosh immediately upon discharge  All the patient's questions addressed      History of Present Illness   Physician Requesting Consult: Yuliana Mckenzie MD  Reason for Consult / Principal Problem:  Right leg pain and swelling  HPI: Alirio Reeder is a 61y o  year old female who presents with increased right knee pain and right lower extremity swelling  Patient states that she underwent right total knee arthroplasty with Dr Jeffrey Garrison on 9/15/2021  The patient remained an inpatient until 9/17  With the patient's delayed discharge in today she did miss her 1st session of physical therapy  She is not participated with physical therapy since discharge  Over the last 2 days her right lower extremity pain and swelling has been progressively getting worse  She states the pain started lower in the right lower extremity and started to migrate up towards the posterior aspect of the knee  Soon the pain started to involve the knee diffusely as well  She denies any chills or rigors at home  She does report a fever at home  She states that she notified her primary surgeon's office and she was recommended to monitor her symptoms of right lower extremity swelling and pain and if it got worse that she was to proceed to the hospital   Over the course of Sunday 9/19/2021 the swelling became progressively worse as did the pain  The patient attempted to contact her surgeon's office but the office was closed  The proceeded to [facility] where they sought treatment  With complaints of right lower extremity swelling, calf pain, knee pain, and fever upon presentation workup for DVT/PE was performed  The patient was found to have acute on chronic PE at the time  A Doppler was not performed overnight  The emergency department staff attempted to contact the on-call service of [facility] for Dr Jeffrey Garrison  In the setting of the postoperative complication [facility] was requested and [facility]  Patient was then admitted to the medical service  Further workup demonstrated right lower extremity swelling was due to multiple, acute DVTs in the right lower extremity  The patient's primary surgeon contacted the patient today and their surgeon advised that the patient be seen by Orthopedics at our institution    Currently the patient's  is at bedside  The pain remains stable through the calf and around the knee today  No paresthesias in the right lower extremity today  The patient denies any shortness of breath or chest pain at bedside today  Consults    Review of Systems   Constitutional: Positive for fever  Negative for chills  HENT: Negative  Eyes: Negative  Respiratory: Negative for cough, choking, chest tightness and shortness of breath  Cardiovascular: Positive for leg swelling  Negative for chest pain  Gastrointestinal: Negative  Musculoskeletal: Positive for arthralgias, gait problem, joint swelling and myalgias  See ortho HPI   Skin: Positive for color change  Hematological: Negative  Psychiatric/Behavioral: Negative          Historical Information   Past Medical History:   Diagnosis Date    Anemia     Cancer ([facility] [address] )     Degenerative joint disease     Disease of thyroid gland     underactive    FHx: non-Hodgkin's lymphoma     macroglobulin anemia    History of back surgery     History of chemotherapy 2014    lymphoma - [id number]    Lumbar disc disease      Past Surgical History:   Procedure Laterality Date    ANKLE SURGERY      [id number]    BACK SURGERY  10/1996    BREAST BIOPSY Left 06/2012    benign    BUNIONECTOMY  08/18/2015    CARPAL TUNNEL RELEASE Left 03/1995    CARPAL TUNNEL RELEASE Right 12/1999    COLONOSCOPY      CT EPIDURAL STEROID INJECTION (TERRELL LUMBAR)  8/26/2016    DISCECTOMY SPINE CERVICAL ANTERIOR W/ ARTHROPLASTY      C3/C4    FOOT GANGLION EXCISION Left 10/27/2017    FOOT SURGERY  08/09/2011    ankle/foot sx    HEMORRHOID SURGERY  2009    JOINT REPLACEMENT      KNEE CARTILAGE SURGERY Right     MOUTH SURGERY      ROTATOR CUFF REPAIR Left 05/14/2008    AIME-EN-Y PROCEDURE  03/31/2008    TOTAL SHOULDER REPLACEMENT  06/24/2014    UPPER GASTROINTESTINAL ENDOSCOPY       Social History   Social History     Substance and Sexual Activity   Alcohol Use Yes    Comment: liter of wine a night  none since about 5 days     Social History     Substance and Sexual Activity   Drug Use Yes    Types: Marijuana    Comment: Medical marijuana 03/01/2021     E-Cigarette/Vaping    E-Cigarette Use Never User      E-Cigarette/Vaping Substances     Social History     Tobacco Use   Smoking Status Former Smoker   Smokeless Tobacco Never Used     Family History: non-contributory    Meds/Allergies   all current active meds have been reviewed  Allergies   Allergen Reactions    Cefaclor Other (See Comments)    Ciprofloxacin     Sulfa Antibiotics GI Intolerance    Sulfamethoxazole-Trimethoprim Other (See Comments)    Talwin [Pentazocine] Dizziness    Vicodin [Hydrocodone-Acetaminophen] GI Intolerance    Ondansetron Rash       Objective   Vitals: Blood pressure 121/74, pulse 93, temperature 99 4 °F (37 4 °C), temperature source Oral, resp  rate 16, height 5' 5" (1 651 m), weight 95 3 kg (210 lb), SpO2 96 %  ,Body mass index is 34 95 kg/m²  Intake/Output Summary (Last 24 hours) at 9/20/2021 1708  Last data filed at 9/20/2021 0830  Gross per 24 hour   Intake 1290 ml   Output 107 ml   Net 1183 ml     I/O last 24 hours: In: 3738 [P O :240; IV Piggyback:1050]  Out: 107 [Urine:107]    Invasive Devices     Peripheral Intravenous Line            Peripheral IV 09/19/21 Distal;Right;Ventral (anterior) Forearm <1 day                Physical Exam  Vitals and nursing note reviewed  Constitutional:       Appearance: Normal appearance  HENT:      Head: Normocephalic and atraumatic  Nose: Nose normal       Mouth/Throat:      Mouth: Mucous membranes are moist       Pharynx: Oropharynx is clear  Eyes:      General:         Right eye: No discharge  Left eye: No discharge  Extraocular Movements: Extraocular movements intact  Conjunctiva/sclera: Conjunctivae normal    Cardiovascular:      Rate and Rhythm: Normal rate  Pulses: Normal pulses     Pulmonary: Effort: Pulmonary effort is normal    Musculoskeletal:      Cervical back: Neck supple  Right knee: Effusion present  Comments: See ortho exam   Skin:     General: Skin is warm  Capillary Refill: Capillary refill takes less than 2 seconds  Findings: Bruising present  Neurological:      General: No focal deficit present  Mental Status: She is alert and oriented to person, place, and time  Psychiatric:         Mood and Affect: Mood normal        Right Knee Exam     Tenderness   The patient is experiencing tenderness in the medial retinaculum, lateral joint line, patella and medial joint line  Range of Motion   Extension: abnormal   Flexion: abnormal     Other   Scars: present (Right foot, fresh anterior knee incision)  Sensation: normal  Pulse: present  Swelling: severe  Effusion: effusion present    Comments: On gross inspection of the right lower extremity there is significant swelling from the thigh down to the foot  Plus three in nature  There is a dressing over the anterior aspect of the right knee  There is some sanguinous drainage on the dressing distally  Dressing was removed and incision was inspected  Staples were used as primary closure of anterior knee incision  There is bruising distally around the distal 3rd of the incision  No erythema or foul odor from the incision  No purulence drainage from the incision  No cellulitic response appreciated  Significant calf tenderness to compression, compartments soft but leg is diffusely swollen  Warmth of the knee normal for stage in postoperative course  Neurovascularly intact    Dressing was replaced with silver impregnated Mepilex dressing by nursing at bedside            Lab Results:   CBC:   Lab Results   Component Value Date    WBC 6 31 09/20/2021    HGB 8 0 (L) 09/20/2021    HCT 24 8 (L) 09/20/2021     (H) 09/20/2021     09/20/2021    MCH 35 2 (H) 09/20/2021    MCHC 32 3 09/20/2021    RDW 12 7 09/20/2021 MPV 9 0 09/20/2021    NRBC 0 09/19/2021     CMP:   Lab Results   Component Value Date    SODIUM 141 09/20/2021     09/20/2021    CO2 28 09/20/2021    BUN 13 09/20/2021    CREATININE 0 91 09/20/2021    CALCIUM 8 1 (L) 09/20/2021    AST 28 09/19/2021    ALT 28 09/19/2021    ALKPHOS 98 09/19/2021    EGFR 67 09/20/2021     PT/INR:   Lab Results   Component Value Date    INR 0 93 09/19/2021     Imaging Studies: I have personally reviewed pertinent reports  Code Status: Level 1 - Full Code  Advance Directive and Living Will:      Power of : Yes  POLST:      Elicia AVILA    Division of Adult Reconstruction  Department of Orthopaedic Surgery  [facility]

## 2021-09-21 PROBLEM — N17.9 AKI (ACUTE KIDNEY INJURY) (HCC): Status: RESOLVED | Noted: 2021-09-20 | Resolved: 2021-09-21

## 2021-09-21 PROBLEM — I82.401 RIGHT LEG DVT (HCC): Status: ACTIVE | Noted: 2021-09-20

## 2021-09-21 PROBLEM — R65.10 SIRS (SYSTEMIC INFLAMMATORY RESPONSE SYNDROME) (HCC): Status: RESOLVED | Noted: 2021-09-20 | Resolved: 2021-09-21

## 2021-09-21 PROBLEM — R51.9 HEADACHE: Status: ACTIVE | Noted: 2021-09-21

## 2021-09-21 LAB — BACTERIA UR CULT: NORMAL

## 2021-09-21 PROCEDURE — 99285 EMERGENCY DEPT VISIT HI MDM: CPT | Performed by: EMERGENCY MEDICINE

## 2021-09-21 PROCEDURE — 99239 HOSP IP/OBS DSCHRG MGMT >30: CPT | Performed by: FAMILY MEDICINE

## 2021-09-21 RX ORDER — ACETAMINOPHEN 500 MG
500 TABLET ORAL EVERY 6 HOURS PRN
Refills: 0
Start: 2021-09-21

## 2021-09-21 RX ORDER — DOCUSATE SODIUM 100 MG/1
100 CAPSULE, LIQUID FILLED ORAL 2 TIMES DAILY
Refills: 0
Start: 2021-09-21 | End: 2021-12-13 | Stop reason: ALTCHOICE

## 2021-09-21 RX ORDER — BUTALBITAL, ACETAMINOPHEN AND CAFFEINE 50; 325; 40 MG/1; MG/1; MG/1
1 TABLET ORAL ONCE
Status: COMPLETED | OUTPATIENT
Start: 2021-09-21 | End: 2021-09-21

## 2021-09-21 RX ADMIN — BUTALBITAL, ACETAMINOPHEN AND CAFFEINE 1 TABLET: 50; 325; 40 TABLET ORAL at 11:57

## 2021-09-21 RX ADMIN — Medication 1 TABLET: at 08:56

## 2021-09-21 RX ADMIN — Medication 3000 UNITS: at 08:56

## 2021-09-21 RX ADMIN — ENOXAPARIN SODIUM 100 MG: 100 INJECTION SUBCUTANEOUS at 13:03

## 2021-09-21 RX ADMIN — Medication 1 TABLET: at 08:55

## 2021-09-21 RX ADMIN — GABAPENTIN 600 MG: 300 CAPSULE ORAL at 08:55

## 2021-09-21 RX ADMIN — OXYCODONE HYDROCHLORIDE 10 MG: 10 TABLET ORAL at 08:55

## 2021-09-21 RX ADMIN — LEVOTHYROXINE SODIUM 50 MCG: 50 TABLET ORAL at 05:26

## 2021-09-21 RX ADMIN — ACETAMINOPHEN 650 MG: 325 TABLET, FILM COATED ORAL at 08:54

## 2021-09-21 RX ADMIN — OXYCODONE HYDROCHLORIDE 10 MG: 10 TABLET ORAL at 03:39

## 2021-09-21 RX ADMIN — OXYCODONE HYDROCHLORIDE 5 MG: 5 TABLET ORAL at 11:58

## 2021-09-21 RX ADMIN — DOCUSATE SODIUM 100 MG: 100 CAPSULE, LIQUID FILLED ORAL at 08:55

## 2021-09-21 NOTE — ASSESSMENT & PLAN NOTE
Creatinine of 1 34 on admission  Patient reported good fluid intake at home  Recent lab work from 09/16 shows creatinine of 0 79  Suspect pre renal in nature  Hydrate with IVF    Creatinine improved today at 0 9

## 2021-09-21 NOTE — ASSESSMENT & PLAN NOTE
As noted by fever, tachycardia due to PE and possible DVT  Patient does have right lower extremity swelling, erythema but likely due to DVT  Patient received a dose of IV Rocephin in the ER, now d/c as BCx negative x2  Procalcitonin negative

## 2021-09-21 NOTE — DISCHARGE SUMMARY
Discharge Summary - [address] [facility]    Patient Information: Cal Pate 61 y o  female MRN: [id number]  Unit/Bed#: [address] [address] Encounter: [id number]    Discharging Physician / Practitioner: Sejal Grimes DO  PCP: Steph Roman MD  Admission Date: 9/19/2021  Discharge Date: 09/21/21    Reason for Admission: Post-Op Infection (had R knee replacement four days ago  today started with increased pain, and redness to leg and running fever  last tylenol about noon)      Discharge Diagnoses:     Principal Problem:    Acute pulmonary embolism without acute cor pulmonale ([facility])  Active Problems:    Right leg DVT ([facility])    Hypothyroid    Status post right knee replacement    Essential hypertension  Resolved Problems:    SIRS (systemic inflammatory response syndrome) ([facility])    JN (acute kidney injury) ([facility] [address] )        * Acute pulmonary embolism without acute cor pulmonale ([facility] [address] )  Assessment & Plan  Patient presented to the ER with complaints of right lower leg redness, swelling, pain and also shortness of breath  On CTA patient noted to have findings of acute on chronic PE  echo showed normal EF with grade 1 diastolic dysfunction and no evidence of right heart strain  She was started on weight based Lovenox q 12 hours  Discussed with Dr Jose L Weems and as patient is recently postop, will continue with Lovenox on discharge    Lovenox teaching done prior to discharge  Discussed with patient to follow up with her primary orthopedic doctor as soon as possible after discharge and patient to discuss with Dr Karen Jesus to see when it is okay to stop the Lovenox and start Eliquis instead    SIRS (systemic inflammatory response syndrome) ([facility])-resolved as of 9/21/2021  Assessment & Plan  As noted by fever, tachycardia due to PE and DVT  Patient presented with right lower extremity swelling, erythema but doubtful that this is due to cellulitis and most likely due to DVT  Patient did receive doses of IV Rocephin while here till blood cultures finalized and it was negative  Procalcitonin neg X 2      Right leg DVT [facility])  Assessment & Plan  Patient with pain, redness and swelling of right lower leg  Possibly due to right total knee arthroplasty on 09/15  vascular duplex of right lower extremity showed acute DVT in mid peroneal veins and soleal vein    MIGUEL ANGEL (acute kidney injury) ([facility])-resolved as of 9/21/2021  Assessment & Plan  Creatinine of 1 34 on admission and has improved to 0 91 today  Patient does report good fluid intake at home  Recent lab work from 09/16 shows creatinine of 0 79  Likely pre renal in nature  She was hydrated with IVF     Essential hypertension  Assessment & Plan  Patient on Prinzide and hydrochlorothiazide separately which was on hold due to Miguel Angel  Advised to discontinue HCTZ that she takes separately and continue with Prinzide for now   Monitor BPs at home and follow up with PCP    Status post right knee replacement  Assessment & Plan  Patient is status post right total knee replacement on 9/15 through [facility]  States she was discharged on 81 mg of aspirin daily which she has been compliant with  Follow-up with primary orthopedist after discharge as soon as possible after discharge  Her right knee dressing was changed while here  Patient states that she is currently weight-bearing as tolerated and ambulates with a walker  Continue Tylenol and oxycodone p r n  Hypothyroid  Assessment & Plan  Continue levothyroxine        Consultations During Hospital Stay:  IP CONSULT TO ORTHOPEDIC SURGERY    Procedures Performed:     · Echo    Significant Findings:     · Refer to hospital course and above listed diagnosis related plan for details    Imaging while in hospital:    Echo complete with contrast if indicated    Result Date: 9/20/2021  Narrative: [address] [phone] Transthoracic Echocardiogram 2D, M-mode, Doppler, and Color Doppler Study date: 20-Sep-2021 Patient: Sahil Resendez MR number: [id number] Account number: [de-identified] : 1955 Age: 61 years Gender: Female Status: Inpatient Location: Bedside Height: 65 in Weight: 209 7 lb BP: 128/ 92 mmHg Indications: Pulmonary Embolism Diagnoses: 415 19 - PULM EMBOL/INFARCT NEC Sonographer:  ADINA Pearce Primary Physician:  Sola Sky MD Referring Physician:  Dot Santamaria DO Group:  [facility] Interpreting Physician:  Maudine Bamberger, MD SUMMARY LEFT VENTRICLE: Systolic function was normal  Ejection fraction was estimated in the range of 50 % to 55 %  There were no regional wall motion abnormalities  Doppler parameters were consistent with abnormal left ventricular relaxation (grade 1 diastolic dysfunction)  LEFT ATRIUM: The atrium was mildly dilated  MITRAL VALVE: There was trace regurgitation  TRICUSPID VALVE: There was trace regurgitation  HISTORY: PRIOR HISTORY: HTN, Hypothyroid, SIRS, Non-Hodgkin's Lymphoma, Obesity, GERD PROCEDURE: The procedure was performed at the bedside  This was a routine study  The transthoracic approach was used  The study included complete 2D imaging, M-mode, complete spectral Doppler, and color Doppler  The heart rate was 82 bpm, at the start of the study  Image quality was adequate  LEFT VENTRICLE: Size was normal  Systolic function was normal  Ejection fraction was estimated in the range of 50 % to 55 %  There were no regional wall motion abnormalities  Wall thickness was normal  No evidence of apical thrombus  DOPPLER: Doppler parameters were consistent with abnormal left ventricular relaxation (grade 1 diastolic dysfunction)  RIGHT VENTRICLE: The size was normal  Systolic function was normal with TAPSE-2 7cm Wall thickness was normal  LEFT ATRIUM: The atrium was mildly dilated  RIGHT ATRIUM: Size was normal  MITRAL VALVE: There was mild thickening  There was normal leaflet separation   DOPPLER: The transmitral velocity was within the normal range  There was no evidence for stenosis  There was trace regurgitation  AORTIC VALVE: The valve was probably trileaflet  Leaflets exhibited mildly increased thickness and normal cuspal separation  DOPPLER: Transaortic velocity was within the normal range  There was no evidence for stenosis  There was no significant regurgitation  TRICUSPID VALVE: The valve structure was normal  There was normal leaflet separation  DOPPLER: The transtricuspid velocity was within the normal range  There was no evidence for stenosis  There was trace regurgitation  Estimated peak PA pressure was 39 mmHg  The findings suggest mild pulmonary hypertension  PULMONIC VALVE: Leaflets exhibited normal thickness, no calcification, and normal cuspal separation  DOPPLER: The transpulmonic velocity was within the normal range  There was no significant regurgitation  PERICARDIUM: There was no pericardial effusion  The pericardium was normal in appearance  AORTA: The root exhibited normal size  SYSTEMIC VEINS: IVC: The inferior vena cava was normal in size   SYSTEM MEASUREMENT TABLES 2D EF (Teich): 52 59 % %FS: 27 05 % Ao Diam: 3 28 cm EDV(Teich): 111 65 ml ESV(Teich): 52 94 ml HR_2Ch_Q: 89 97 bpm HR_4Ch_Q: 95 45 bpm IVSd: 1 03 cm LA Area: 22 02 cm2 LA Diam: 4 05 cm LVCO_2Ch_Q: 6 48 L/min LVCO_4Ch_Q: 6 36 L/min LVCO_BiP_Q: 6 42 L/min LVEF_2Ch_Q: 52 9 % LVEF_4Ch_Q: 54 62 % LVEF_BiP_Q: 54 07 % LVIDd: 4 88 cm LVIDs: 3 56 cm LVLd_2Ch_Q: 8 69 cm LVLd_4Ch_Q: 8 73 cm LVLs_2Ch_Q: 7 21 cm LVLs_4Ch_Q: 7 cm LVPWd: 0 91 cm LVSV_2Ch_Q: 72 01 ml LVSV_4Ch_Q: 66 63 ml LVSV_BiP_Q: 69 41 ml LVVED_2Ch_Q: 136 14 ml LVVED_4Ch_Q: 122 ml LVVED_BiP_Q: 128 38 ml LVVES_2Ch_Q: 64 13 ml LVVES_4Ch_Q: 55 37 ml LVVES_BiP_Q: 58 97 ml RA Area: 14 54 cm2 RVIDd: 3 31 cm SV (Teich): 58 72 ml CW TR Vmax: 2 53 m/s TR maxP 52 mmHg MM TAPSE: 2 72 cm PW MV E/A Ratio: 0 85 E' Sept: 0 09 m/s E/E' Sept: 10 63 MV A Cory: 1 13 m/s MV Dec [place]: 5 66 m/s2 MV DecT: 168 6 ms MV E Cory: 0 95 m/s MV PHT: 48 89 ms MVA By PHT: 4 5 cm2 [employer] Accredited Echocardiography Laboratory Prepared and electronically signed by Laurie Yip MD Signed 20-Sep-2021 16:30:28     XR chest 1 view portable    Result Date: 9/20/2021  Narrative: CHEST INDICATION:   fever, sob  COMPARISON:  February 14, 2019 EXAM PERFORMED/VIEWS:  XR CHEST PORTABLE FINDINGS: Cardiomediastinal silhouette appears unremarkable  Linear density in the left midlung field consistent with atelectasis  [place] Sole No pneumothorax or pleural effusion  Osseous structures appear within normal limits for patient age  Stable postoperative changes in the right shoulder  Interval thoracolumbar fusion  Impression: Left midlung field linear atelectasis  Workstation performed: [id number]     CTA ED chest PE study    Result Date: 9/19/2021  Narrative: CTA - CHEST WITH IV CONTRAST - PULMONARY ANGIOGRAM INDICATION:   Shortness of breath PE suspected, high pretest prob sob, post-op  COMPARISON: 11/11/2020  TECHNIQUE: CTA examination of the chest was performed using angiographic technique according to a protocol specifically tailored to evaluate for pulmonary embolism  Axial, sagittal, and coronal 2D reformatted images were created from the source data and  submitted for interpretation  In addition, coronal 3D MIP postprocessing was performed on the acquisition scanner  Radiation dose length product (DLP) for this visit:  405 95 mGy-cm   This examination, like all CT scans performed in the [facility], was performed utilizing techniques to minimize radiation dose exposure, including the use of iterative  reconstruction and automated exposure control   IV Contrast:  85 mL of iohexol (OMNIPAQUE)  FINDINGS: PULMONARY ARTERIAL TREE:  There is new intraluminal filling defect noted within the right superior pulmonary and upper lobe segmental branch (2:64-66, 601:76), in the region of previously described eccentric filling defect suspicious for chronic pulmonary embolism  Previously noted eccentric filling defect in left inferior pulmonary artery is not well appreciated on current study  LUNGS:  No consolidation or suspicious pulmonary nodule  There is no tracheal or endobronchial lesion  PLEURA:  Unremarkable  HEART/GREAT VESSELS:  Unremarkable for patient's age  MEDIASTINUM AND SIS:  Unremarkable  CHEST WALL AND LOWER NECK:   Unremarkable  VISUALIZED STRUCTURES IN THE UPPER ABDOMEN:  Unremarkable  OSSEOUS STRUCTURES:  No acute fracture or destructive osseous lesion  Partially visualized thoracolumbar fusion hardware again noted  Impression: Findings as above suspicious for acute on chronic pulmonary embolus involving the right superior pulmonary artery and segmental branch  No bowing of the intraventricular septum or reflux of contrast into the hepatic veins/IVC to suggest associated right  heart strain  No consolidation or effusion  Discussed with Dr Юлия Carrasquillo at 00:21 PM on 8/51/0079 with verbal confirmation by the recipient  Workstation performed: [id number]     VAS lower limb venous duplex study, unilateral/limited    Result Date: 9/20/2021  Narrative:  [facility] REPORT CLINICAL: Indications: Patient presents with recent discovery of pulmonary embolism and physician wants to determine potential source  Patient reports post-op right TKR with pain and swelling  Operative History: No cardiovascular surgeries Risk Factors: The patient has history of obesity  FINDINGS:  Right          Impression          Gastrocnemius  Thrombosed (acute)  Calf Veins     Thrombosed (acute)     CONCLUSION:  Impression: RIGHT LOWER LIMB Acute, deep vein thrombosis noted in the mid peroneal veins and soleal vein  No evidence of superficial thrombophlebitis noted  Doppler evaluation shows a normal response to augmentation maneuvers  Popliteal, posterior tibial and anterior tibial arterial Doppler waveforms are triphasic  There is a well defined hypoechoic non-vascularized cystic-type structure in the popliteal fossa  LEFT LOWER LIMB LIMITED Evaluation shows no evidence of thrombus in the common femoral vein  Doppler evaluation shows a normal response to augmentation maneuvers  Technical findings were given to Dr Claudette العراقي 4:04 pm   SIGNATURE: Electronically Signed by: Sammy Bui on 2021-09-20 04:40:19 PM      Incidental Findings:   · none    Test Results Pending at Discharge (will require follow up):   · As per After Visit Summary     Outpatient Tests Requested:  · none    Complications:  Refer to hospital course and above listed diagnosis related plan, if any    Hospital Course:     Hermes Smith is a 61 y o  female patient who originally presented to the hospital on 9/19/2021 due to right lower leg redness, swelling and pain that started on 09/19  Patient also reported a temp of 102° at home and then shortness of breath that started later in the day  Patient is status post right total knee replacement through [facility] on 9/15  Reported compliance with baby aspirin  Reported pain behind her right knee and tightness feeling in her right lower leg  On imaging patient was noted to have PE and was admitted  Patient also noted to have DVT on the ocular duplex  She was seen by Orthopedics while here in cleared by Orthopedics prior to discharge  Discharge plan discussed in details with patient  Offered to call family but patient declined  Patient to hold off on outpatient physical therapy till re-evaluated by Orthopedics and PMD as patient still with significant edema in her right lower leg which is limiting her range of motion  Discussed with patient to weight bear as tolerated of right lower extremity at home and to slowly increase her activity level as her symptoms improve    Please see above list of diagnoses and related plan for additional information         Condition at Discharge: stable Discharge Day Visit / Exam:     Subjective:  Reports headache and pain and swelling of her right leg    Vitals: Blood Pressure: 118/80 (09/21/21 0849)  Pulse: 96 (09/21/21 0849)  Temperature: (!) 97 3 °F (36 3 °C) (09/21/21 0849)  Temp Source: Oral (09/21/21 0100)  Respirations: 16 (09/21/21 0100)  Height: 5' 5" (165 1 cm) (09/20/21 0149)  Weight - Scale: 95 3 kg (210 lb) (09/20/21 0149)  SpO2: 95 % (09/21/21 0849)  Exam:   Physical Exam  Constitutional:       General: She is not in acute distress  Appearance: She is not diaphoretic  HENT:      Head: Normocephalic and atraumatic  Eyes:      General:         Right eye: No discharge  Left eye: No discharge  Cardiovascular:      Rate and Rhythm: Normal rate and regular rhythm  Pulmonary:      Effort: Pulmonary effort is normal  No respiratory distress  Breath sounds: Normal breath sounds  No wheezing or rales  Abdominal:      General: Bowel sounds are normal  There is no distension  Palpations: Abdomen is soft  Tenderness: There is no abdominal tenderness  Musculoskeletal:      Comments: Right lower extremity with edema and tenderness  Erythema has resolved  Right knee dressing in place  Some bruising and tenderness noted around the right knee   Neurological:      Mental Status: She is alert and oriented to person, place, and time  Discharge instructions/Information to patient and family:(Discharge Medications and Follow up):   See after visit summary for information provided to patient and family  Provisions for Follow-Up Care:  See after visit summary for information related to follow-up care and any pertinent home health orders  Disposition: Home    Planned Readmission:  No     Discharge Statement:  I spent 35 minutes discharging the patient  This time was spent on the day of discharge  I had direct contact with the patient on the day of discharge   Greater than 50% of the total time was spent examining patient, answering all patient questions, arranging and discussing plan of care with patient as well as directly providing post-discharge instructions  Additional time then spent on discharge activities  Discharge Medications:  See after visit summary for reconciled discharge medications provided to patient and family  ** Please Note: "This note has been constructed using a voice recognition system  Therefore there may be syntax, spelling, and/or grammatical errors   Please call if you have any questions  "**

## 2021-09-21 NOTE — DISCHARGE INSTRUCTIONS
Follow-up with Dr Dev Pond as soon as possible after discharge    You are being discharged on Lovenox injections  When you follow up with Dr Dev Pond, please find out from him when it is okay to stop the Lovenox and start Eliquis instead  Your primary care doctor or Dr Dev Pond can prescribe you the Eliquis    For now, discontinue hydrochlorothiazide that you take separately at home and continue with Prinzide (Lisinopril-HCTZ) alone  Check your blood pressures at home and if the top number is consistently running less than 110, then you can even hold the Prinzide for now till you follow-up with your primary doctor    Acute Kidney Injury   AMBULATORY CARE:   Acute kidney injury (JN) is also called acute kidney failure, or acute renal failure  JN happens when your kidneys suddenly stop working correctly  Normally, the kidneys remove fluid, chemicals, and waste from your blood  These wastes are turned into urine by your kidneys  JN usually happens over hours or days  When you have JN, your kidneys do not remove the waste, chemicals, or extra fluid from your body  A normal amount of urine is not produced  JN is usually temporary, but it may become a chronic kidney condition  Causes of JN:   · Decreased blood flow to the kidney, such as from hypercalcemia (high blood calcium level) or severe heart disease     · A disease or condition that affects the kidneys, such as hypertension (high blood pressure) or diabetes     · A blockage in the kidney or ureter, such as a kidney or bladder stone, enlarged prostate, or tumor    Common symptoms include the following: You may not have any symptoms with early or mild JN   As JN progresses, you may have any of the following:  · Decrease in the amount of urine or no urination    · Swelling in your arms, legs, or feet     · Weakness, drowsiness, or no appetite    · Nausea, flank pain, muscle twitching or muscle cramps    · Itchy skin, or your, breath or body smells like urine    · Behavior changes, confusion, disorientation, or seizures    Call 911 if:   · You have sudden chest pain or trouble breathing  Seek care immediately if:   · Your symptoms get worse  Contact your healthcare provider if:   · Your symptoms return  · Your blood sugar or blood pressure level is not within the range your healthcare provider recommends  · You have questions or concerns about your condition or care  Treatment for JN  depends upon the cause of your acute kidney injury and how severe it is  Usually, JN will be monitored in the hospital  If you have mild JN, you may be able to go home to recover  Your healthcare providers will treat the cause of your JN  You may need IV fluids if your JN was caused by little or no fluid in your body  You may need dialysis to remove waste and extra fluid from your body  Nutrition:  Your healthcare provider may tell you to eat food low in sodium (salt), potassium, phosphorus, or protein  A dietitian can help you plan your meals  Drink liquids as directed: Your healthcare provider may recommend that you drink a certain amount of liquids  This will help your kidneys work better and decrease your risk for dehydration  Ask how much liquid to drink each day and which liquids are best for you  What you can do to manage and prevent JN:   · Monitor and manage other health conditions  such as diabetes, high blood pressure, or heart disease  These conditions increase your risk for acute kidney injury  Take your medicines for these conditions as directed  Also, monitor your blood sugar and blood pressure levels as directed  Contact your healthcare provider if your levels are not in the range he or she says it should be  · Talk to your healthcare provider before you take over-the-counter-medicine  NSAIDs, stomach medicine, or laxatives may harm your kidneys and increase your risk for acute kidney injury   If it is okay to take the medicine, follow the directions on the package  Do not take more than directed  · Tell healthcare providers you have had acute kidney injury  before you get contrast liquid for an x-ray or CT scan  Your healthcare provider may give you medicine to prevent kidney problems caused by the liquid  Follow up with your healthcare provider as directed:  Write down your questions so you remember to ask them during your visits  © Copyright [employer] 2021 Information is for End User's use only and may not be sold, redistributed or otherwise used for commercial purposes  All illustrations and images included in CareNotes® are the copyrighted property of [employer]  or [address] [place], [place] [address]janet   The above information is an  only  It is not intended as medical advice for individual conditions or treatments  Talk to your doctor, nurse or pharmacist before following any medical regimen to see if it is safe and effective for you      Stop your aspirin on discharge

## 2021-09-21 NOTE — ASSESSMENT & PLAN NOTE
Patient on Prinzide and hydrochlorothiazide separately, currently on hold due to JN  Monitor BP, stable today  Restart Prinzide on d/c, continue holding hydrochlorothiazide

## 2021-09-21 NOTE — ASSESSMENT & PLAN NOTE
Suspect tension type headache secondary to chronic neck pain and stiffness  Given dose of Fioricet  Continue with acetaminophen PRN

## 2021-09-21 NOTE — CASE MANAGEMENT
Case Management Assessment & Discharge Planning Note    Patient name Regan Phillip  Location [address] [facility] [address] MRN [id number]  : 1955 Date 2021       Current Admission Date: 2021  Current Admission Diagnosis:  Acute pulmonary embolism without acute cor pulmonale ([address] )  Previous Admission - Discharge Date:20   LOS (days): 1  Geometric Mean LOS (GMLOS) (days): 3 90  Days to GMLOS:2 4 Previous Discharge Diagnosis:  There are no discharge diagnoses documented for the most recent discharge  OBJECTIVE:    Risk of Unplanned Readmission Score: 18   Bundle(if applicable):    Current admission status: Inpatient       Preferred Pharmacy:   [id number], [address] [facility], [address] [place] [address],[address] [id number]  Phone: [phone] Fax: [phone] [address] [place] [address], [address] [place] [address]  Phone: [phone] Fax: [phone] [address], [address], [address]  [address], [address] [address]  Phone: [phone] Fax: [phone] [facility] [place]  [address] [place] [address]  Phone: [phone] Fax: [phone]    Primary Care Provider: Aviva Espitia MD    Primary Insurance: [employer]  Secondary Insurance:     ASSESSMENT:  Patient Information  Mental Status: Alert  Assessment information provided by[de-identified] Patient  Primary Caregiver: Self  Support Systems: Spouse/significant other    Means of Transportation  Means of Transport to Appts[de-identified] Drives Self    DISCHARGE DETAILS:    Discharge planning discussed with[de-identified] patient    IMM Given (Date):: 21  IMM Given to[de-identified] Patient (Discussed and she verbally states understanding and is agreeable to dc today   Copy provided to patient and copy in bin to be scanned into chart )     CM met with patient and discussed dc planning and the role of cm  Patient denies the need for any services at dc

## 2021-09-21 NOTE — APP STUDENT NOTE
QUINN STUDENT  Inpatient Progress Note for TRAINING ONLY  Not Part of Legal Medical Record     Progress Note - Aziza Gifford 61 y o  female MRN: [id number]  Unit/Bed#: [address] [place] [address] Encounter: [id number]        * Acute pulmonary embolism without acute cor pulmonale [facility])  Assessment & Plan  Patient presented to the ER with complaints of right lower leg redness, swelling, pain and also shortness of breath  On CTA patient noted to have findings of acute on chronic PE  No obvious signs of right heart strain on CTA and ECHO  Patient placed on Lovenox during admission, will continue on d/c  SIRS (systemic inflammatory response syndrome) (HCC)  Assessment & Plan  As noted by fever, tachycardia due to PE and possible DVT  Patient does have right lower extremity swelling, erythema but likely due to DVT  Patient received a dose of IV Rocephin in the ER, now d/c as BCx negative x2  Procalcitonin negative  Pain and swelling of right lower leg  Assessment & Plan  Patient with pain, redness and swelling of right lower leg  Vascular duplex on 9/20 showed acute, deep vein thrombosis noted in the mid peroneal veins and soleal vein of RLE  JN (acute kidney injury) ([facility] [address] )  Assessment & Plan  Creatinine of 1 34 on admission  Patient reported good fluid intake at home  Recent lab work from 09/16 shows creatinine of 0 79  Suspect pre renal in nature  Hydrate with IVF  Creatinine improved today at 0 9    Headache  Assessment & Plan  Suspect tension type headache secondary to chronic neck pain and stiffness  Given dose of Fioricet  Continue with acetaminophen PRN  Essential hypertension  Assessment & Plan  Patient on Prinzide and hydrochlorothiazide separately, currently on hold due to JN  Monitor BP, stable today  Restart Prinzide on d/c, continue holding hydrochlorothiazide       Status post right knee replacement  Assessment & Plan  Patient is status post right total knee replacement on 9/15 through [facility]  States she was discharged on 81 mg of aspirin daily which she has been compliant with  Follow-up with primary orthopedist after discharge  Patient currently weight-bearing as tolerated and ambulates with a walker  Continue Tylenol and oxycodone p r n  Ortho eval on 9/20 for R knee pain and swelling, suspect related to DVT  No evidence of post-op infection or cellulitis  Continue elevated, analgesia, weight bearing as tolerated  Hypothyroid  Assessment & Plan  Continue Levothyroxine  VTE Pharmacologic Prophylaxis:   Pharmacologic: Enoxaparin (Lovenox)  Mechanical VTE Prophylaxis in Place: No    Patient Centered Rounds: I have evaluated patient without nursing staff present due to discussing case with Dr Joanne Robertson with Specialists or Other Care Team Provider: 10 min    Education and Discussions with Family / Patient: 20 min    Time Spent for Care: 20 minutes  More than 50% of total time spent on counseling and coordination of care as described above  Current Length of Stay: 1 day(s)    Current Patient Status: Inpatient   Certification Statement: The patient, who was admitted as an inpatient, is being discharged sooner than originally anticipated due to: ECHO normal and clinically improving  Discharge Plan: today     Code Status: Level 1 - Full Code    Subjective:   DS is a 60 y/o F currently HD #2 admitted for acute PE and RLE DVT  Pt reports difficulty sleeping last night secondary to headache  Pt states headache has been present x2 days, located in the back side of head, and has been progressively worsening  Notes a hx of cervical spine issues with removal of cervical disc several years ago  She continues to experience chronic neck pain and limited ROM, and unsure if she has recently aggravated her neck since being admitted  She believes headache may be related to her worsening neck pain  She denies any associated vision changes, dizziness, or lightheadedness   Pt also notes ongoing pain and swelling in RLE secondary to DVT and s/p knee replacement  She reports 9/10 pain mostly in her R knee area, however states the pain radiates down her leg with numbness in her toes  She reports limited ROM in R knee and foot  She denies any fevers or chills  Denies any CP or heart palpitations  Denies any SOB  Notes some WEBSTER secondary to deconditioning  Notes a chronic productive cough, which she attributes to allergies  She reports appetite is limited secondary to pain, however she is forcing herself to eat  She notes last BM was yesterday  Objective:     Vitals:   Temp (24hrs), Av 5 °F (36 9 °C), Min:97 3 °F (36 3 °C), Max:99 2 °F (37 3 °C)    Temp:  [97 3 °F (36 3 °C)-99 2 °F (37 3 °C)] 97 3 °F (36 3 °C)  HR:  [] 96  Resp:  [16-18] 16  BP: (118-127)/(80-86) 118/80  SpO2:  [90 %-95 %] 95 %  Body mass index is 34 95 kg/m²  Input and Output Summary (last 24 hours): Intake/Output Summary (Last 24 hours) at 2021 1153  Last data filed at 2021 1901  Gross per 24 hour   Intake 480 ml   Output --   Net 480 ml       Physical Exam:     General: WDWN female appearing stated age in no acute distress  Skin: Warm and dry  Neck: Limited ROM with lateral flexion and lateral rotation  CV: Tachycardic  Regular rhythm  No murmurs, rubs, or gallops  Pulm: CTAB  No wheezes, rales, or rhonchi  Abdomen: Soft, non-distended, non-tender  BS x4  MSK: R leg swollen and tender to palpation at knee  Limited ROM in R knee and foot  PV: 2+ dorsalis pedis pulses           Historical Information   Past Medical History:   Diagnosis Date    Anemia     Cancer ([facility] [address] )     Degenerative joint disease     Disease of thyroid gland     underactive    FHx: non-Hodgkin's lymphoma     macroglobulin anemia    History of back surgery     History of chemotherapy 2014    lymphoma - [id number]    Lumbar disc disease      Past Surgical History:   Procedure Laterality Date    ANKLE SURGERY [id number],[id number]    BACK SURGERY  10/1996    BREAST BIOPSY Left 06/2012    benign    BUNIONECTOMY  08/18/2015    CARPAL TUNNEL RELEASE Left 03/1995    CARPAL TUNNEL RELEASE Right 12/1999    COLONOSCOPY      CT EPIDURAL STEROID INJECTION (TERRELL LUMBAR)  8/26/2016    DISCECTOMY SPINE CERVICAL ANTERIOR W/ ARTHROPLASTY      C3/C4    FOOT GANGLION EXCISION Left 10/27/2017    FOOT SURGERY  08/09/2011    ankle/foot sx    HEMORRHOID SURGERY  2009    JOINT REPLACEMENT      KNEE CARTILAGE SURGERY Right     MOUTH SURGERY      ROTATOR CUFF REPAIR Left 05/14/2008    AIME-EN-Y PROCEDURE  03/31/2008    TOTAL SHOULDER REPLACEMENT  06/24/2014    UPPER GASTROINTESTINAL ENDOSCOPY       Social History   Social History     Substance and Sexual Activity   Alcohol Use Yes    Comment: liter of wine a night   none since about 5 days     Social History     Substance and Sexual Activity   Drug Use Yes    Types: Marijuana    Comment: Medical marijuana 03/01/2021     Social History     Tobacco Use   Smoking Status Former Smoker   Smokeless Tobacco Never Used     Family History: non-contributory    Meds/Allergies   all medications and allergies reviewed  Allergies   Allergen Reactions    Cefaclor Other (See Comments)    Ciprofloxacin     Sulfa Antibiotics GI Intolerance    Sulfamethoxazole-Trimethoprim Other (See Comments)    Talwin [Pentazocine] Dizziness    Vicodin [Hydrocodone-Acetaminophen] GI Intolerance    Ondansetron Rash       Additional Data:     Labs:    Results from last 7 days   Lab Units 09/20/21  0529 09/19/21  2041   WBC Thousand/uL 6 31 9 11   HEMOGLOBIN g/dL 8 0* 9 8*   HEMATOCRIT % 24 8* 30 6*   PLATELETS Thousands/uL 292 366   NEUTROS PCT %  --  65   LYMPHS PCT %  --  14   MONOS PCT %  --  11   EOS PCT %  --  9*     Results from last 7 days   Lab Units 09/20/21  0529 09/19/21  2041   SODIUM mmol/L 141 136   POTASSIUM mmol/L 3 8 4 2   CHLORIDE mmol/L 106 97*   CO2 mmol/L 28 29   BUN mg/dL 13 17   CREATININE mg/dL 0 91 1 34*   ANION GAP mmol/L 7 10   CALCIUM mg/dL 8 1* 9 1   ALBUMIN g/dL  --  3 4*   TOTAL BILIRUBIN mg/dL  --  0 79   ALK PHOS U/L  --  98   ALT U/L  --  28   AST U/L  --  28   GLUCOSE RANDOM mg/dL 98 107     Results from last 7 days   Lab Units 09/19/21  2041   INR  0 93             Results from last 7 days   Lab Units 09/20/21  0529 09/19/21 2041   LACTIC ACID mmol/L  --  1 1   PROCALCITONIN ng/ml <0 05 0 05         * I Have Reviewed All Lab Data Listed Above  * Additional Pertinent Lab Tests Reviewed: All Labs Within Last 24 Hours Reviewed    Imaging:    Imaging Reports Reviewed Today Include: ECHO      Recent Cultures (last 7 days):     Results from last 7 days   Lab Units 09/19/21  2259 09/19/21 2041 09/19/21 2026   BLOOD CULTURE   --  No Growth at 24 hrs  No Growth at 24 hrs     URINE CULTURE  No Growth <1000 cfu/mL  --   --        Last 24 Hours Medication List:   Current Facility-Administered Medications   Medication Dose Route Frequency Provider Last Rate    acetaminophen  650 mg Oral Q6H PRN Thuy Revankar, DO      butalbital-acetaminophen-caffeine  1 tablet Oral Once Thuy Revankar, DO      calcium carbonate-vitamin D  1 tablet Oral Daily With Breakfast Thuy Revankar, DO      cholecalciferol  3,000 Units Oral Daily Thuy Revankar, DO      cyclobenzaprine  5 mg Oral TID PRN Thuy Revankar, DO      docusate sodium  100 mg Oral BID Thuy Revankar, DO      enoxaparin  1 mg/kg Subcutaneous Q12H [facility] Thuy Revankar, DO      gabapentin  600 mg Oral TID Thuy Revankar, DO      levothyroxine  50 mcg Oral Early Morning Thuy Revankar, DO      morphine injection  4 mg Intravenous Q6H PRN Teodoro Beltran MD      multivitamin-minerals  1 tablet Oral Daily Thuy Revankar, DO      oxyCODONE  10 mg Oral Q4H PRN Opal Fuchs MD      oxyCODONE  5 mg Oral Q6H PRN Opal Fuchs MD      polyethylene glycol  17 g Oral Daily PRN Thuy Revankar, DO          Today, Patient Was Seen By: Jany Holguin    ** Please Note: Dictation voice to text software may have been used in the creation of this document   **

## 2021-09-21 NOTE — PLAN OF CARE
Problem: MOBILITY - ADULT  Goal: Maintain or return to baseline ADL function  Description: INTERVENTIONS:  -  Assess patient's ability to carry out ADLs; assess patient's baseline for ADL function and identify physical deficits which impact ability to perform ADLs (bathing, care of mouth/teeth, toileting, grooming, dressing, etc )  - Assess/evaluate cause of self-care deficits   - Assess range of motion  - Assess patient's mobility; develop plan if impaired  - Assess patient's need for assistive devices and provide as appropriate  - Encourage maximum independence but intervene and supervise when necessary  - Involve family in performance of ADLs  - Assess for home care needs following discharge   - Consider OT consult to assist with ADL evaluation and planning for discharge  - Provide patient education as appropriate  Outcome: Progressing  Goal: Maintains/Returns to pre admission functional level  Description: INTERVENTIONS:  - Perform BMAT or MOVE assessment daily    - Set and communicate daily mobility goal to care team and patient/family/caregiver  - Collaborate with rehabilitation services on mobility goals if consulted  - Perform Range of Motion 4 times a day  - Reposition patient every 2 hours    - Dangle patient 3 times a day  - Stand patient 3 times a day  - Ambulate patient 3 times a day  - Out of bed to chair 3 times a day   - Out of bed for meals 3 times a day  - Out of bed for toileting  - Record patient progress and toleration of activity level   Outcome: Progressing     Problem: Potential for Falls  Goal: Patient will remain free of falls  Description: INTERVENTIONS:  - Educate patient/family on patient safety including physical limitations  - Instruct patient to call for assistance with activity   - Consult OT/PT to assist with strengthening/mobility   - Keep Call bell within reach  - Keep bed low and locked with side rails adjusted as appropriate  - Keep care items and personal belongings within reach  - Initiate and maintain comfort rounds  - Make Fall Risk Sign visible to staff  - Offer Toileting every 2 Hours, in advance of need  - Initiate/Maintain bed alarm  - Obtain necessary fall risk management equipment: yellow socks  - Apply yellow socks and bracelet for high fall risk patients  - Consider moving patient to room near nurses station  Outcome: Progressing     Problem: PAIN - ADULT  Goal: Verbalizes/displays adequate comfort level or baseline comfort level  Description: Interventions:  - Encourage patient to monitor pain and request assistance  - Assess pain using appropriate pain scale  - Administer analgesics based on type and severity of pain and evaluate response  - Implement non-pharmacological measures as appropriate and evaluate response  - Consider cultural and social influences on pain and pain management  - Notify physician/advanced practitioner if interventions unsuccessful or patient reports new pain  Outcome: Progressing     Problem: INFECTION - ADULT  Goal: Absence or prevention of progression during hospitalization  Description: INTERVENTIONS:  - Assess and monitor for signs and symptoms of infection  - Monitor lab/diagnostic results  - Monitor all insertion sites, i e  indwelling lines, tubes, and drains  - Monitor endotracheal if appropriate and nasal secretions for changes in amount and color  - [place] appropriate cooling/warming therapies per order  - Administer medications as ordered  - Instruct and encourage patient and family to use good hand hygiene technique  - Identify and instruct in appropriate isolation precautions for identified infection/condition  Outcome: Progressing     Problem: SAFETY ADULT  Goal: Maintain or return to baseline ADL function  Description: INTERVENTIONS:  -  Assess patient's ability to carry out ADLs; assess patient's baseline for ADL function and identify physical deficits which impact ability to perform ADLs (bathing, care of mouth/teeth, toileting, grooming, dressing, etc )  - Assess/evaluate cause of self-care deficits   - Assess range of motion  - Assess patient's mobility; develop plan if impaired  - Assess patient's need for assistive devices and provide as appropriate  - Encourage maximum independence but intervene and supervise when necessary  - Involve family in performance of ADLs  - Assess for home care needs following discharge   - Consider OT consult to assist with ADL evaluation and planning for discharge  - Provide patient education as appropriate  Outcome: Progressing  Goal: Maintains/Returns to pre admission functional level  Description: INTERVENTIONS:  - Perform BMAT or MOVE assessment daily    - Set and communicate daily mobility goal to care team and patient/family/caregiver  - Collaborate with rehabilitation services on mobility goals if consulted  - Perform Range of Motion 4 times a day  - Reposition patient every 2 hours    - Dangle patient 3 times a day  - Stand patient 3 times a day  - Ambulate patient 3 times a day  - Out of bed to chair 3 times a day   - Out of bed for meals 3 times a day  - Out of bed for toileting  - Record patient progress and toleration of activity level   Outcome: Progressing  Goal: Patient will remain free of falls  Description: INTERVENTIONS:  - Educate patient/family on patient safety including physical limitations  - Instruct patient to call for assistance with activity   - Consult OT/PT to assist with strengthening/mobility   - Keep Call bell within reach  - Keep bed low and locked with side rails adjusted as appropriate  - Keep care items and personal belongings within reach  - Initiate and maintain comfort rounds  - Make Fall Risk Sign visible to staff  - Offer Toileting every 2 Hours, in advance of need  - Initiate/Maintain bed alarm  - Obtain necessary fall risk management equipment: yellow socks  - Apply yellow socks and bracelet for high fall risk patients  - Consider moving patient to room near nurses station  Outcome: Progressing     Problem: DISCHARGE PLANNING  Goal: Discharge to home or other facility with appropriate resources  Description: INTERVENTIONS:  - Identify barriers to discharge w/patient and caregiver  - Arrange for needed discharge resources and transportation as appropriate  - Identify discharge learning needs (meds, wound care, etc )  - Arrange for interpretive services to assist at discharge as needed  - Refer to Case Management Department for coordinating discharge planning if the patient needs post-hospital services based on physician/advanced practitioner order or complex needs related to functional status, cognitive ability, or social support system  Outcome: Progressing     Problem: Knowledge Deficit  Goal: Patient/family/caregiver demonstrates understanding of disease process, treatment plan, medications, and discharge instructions  Description: Complete learning assessment and assess knowledge base    Interventions:  - Provide teaching at level of understanding  - Provide teaching via preferred learning methods  Outcome: Progressing

## 2021-09-21 NOTE — ASSESSMENT & PLAN NOTE
Patient with pain, redness and swelling of right lower leg  Vascular duplex on 9/20 showed acute, deep vein thrombosis noted in the mid peroneal veins and soleal vein of RLE

## 2021-09-21 NOTE — UTILIZATION REVIEW
Initial Clinical Review    Admission: Date/Time/Statement:   Admission Orders (From admission, onward)     Ordered        09/20/21 0029  INPATIENT ADMISSION  Once                   Orders Placed This Encounter   Procedures    INPATIENT ADMISSION     Standing Status:   Standing     Number of Occurrences:   1     Order Specific Question:   Level of Care     Answer:   Med Surg [16]     Order Specific Question:   Estimated length of stay     Answer:   More than 2 Midnights     Order Specific Question:   Certification     Answer:   I certify that inpatient services are medically necessary for this patient for a duration of greater than two midnights  See H&P and MD Progress Notes for additional information about the patient's course of treatment  ED Arrival Information     Expected Arrival Acuity    - 9/19/2021 19:33 Urgent         Means of arrival Escorted by Service Admission type    Wheelchair Spouse Hospitalist Urgent         Arrival complaint    Pain in right leg,         Chief Complaint   Patient presents with    Post-Op Infection     had R knee replacement four days ago  today started with increased pain, and redness to leg and running fever  last tylenol about noon       Initial Presentation:   61 y o  female PMH anemia cancer djd thyroid gland, who presents with Right lower leg redness, swelling and pain that started on 09/19  Patient also reported a temp of 102° at home and then shortness of breath that started later in the day  Patient is status post right total knee replacement through [facility] on 9/15  Reports compliance with baby aspirin  Patient reports pain behind her right knee and states that she has a Baker cyst but also reports a tightness feeling in her right lower leg  Exam Right lower extremity with edema, erythema, warmth  Mild Right calf tenderness noted with palpation  Right knee with dressing in place anteriorly along with some bruising around the knee   H/h 9 8/30 6 cr 1 34   CTA chest showing acute on chronic PE   Admitted Inpatient Acute PE  Will obtain echo for further evaluation , will continue weight based Lovenox  SIRS + fever ,tachycarida due to PE and possible DVT  Received IV Rocephin will continue for now till blood cx finalize  Repeat procalcitonin  Pain and swelling right lower leg obtain vascular duplex of leg  JN  Creatinine 1 34 today recent 0 79 continue gentle IVF hydration check labs and PVR  Essential hypertension hold Prinzide and hydrochlorothiazide   Due to JN  S/p right knee replacement 9/15/21 [facility]  Was taking asa 81mg qd  Currently weight bearing as tolerates continue tylenol oxycodone prn  ORTHOPEDIC CONSULT  Acute PE and DVT right lower extremity   At this point her right lower extremity complaints are due to multiple acute DVTs in the right lower extremity status post right total knee arthroplasty  No s/s of post op infections or cellulitis at this point  tmax on admission was 102, today 99 3 without leukocytosis   Fever 2/2 dvt pe  Patient advised that therapeutic anticoagulation this early the postoperative course may increase the chance of hemarthrosis/hematoma  Elevate right lower extremity at rest  Analgesia p r n  Weightbear as tolerated right lower extremity when symptoms improve  P T /OT-weightbear as tolerated right lower extremity  Dressing changed at bedside by nursing today  Dressing may stay in place 7 days  Date: 9/21/21   Day 2:   Acute pulmonary embolism without acute cor pulmonale  Patient presented to the ER with complaints of right lower leg redness, swelling, pain and also shortness of breath  On CTA patient noted to have findings of acute on chronic PE  No obvious signs of right heart strain on CTA and ECHO    Patient placed on Lovenox during admission, will continue on d/c   ED Triage Vitals [09/19/21 1946]   Temperature Pulse Respirations Blood Pressure SpO2   (!) 102 °F (38 9 °C) (!) 12 20 133/69 97 %      Temp Source Heart Rate Source Patient Position - Orthostatic VS BP Location FiO2 (%)   Tympanic Monitor Sitting Left arm --      Pain Score       9          Wt Readings from Last 1 Encounters:   09/20/21 95 3 kg (210 lb)     Additional Vital Signs:   /21/21 08:49:41  97 3 °F (36 3 °C)Abnormal   96  --  118/80  93  95 %  --  --  --  --   09/21/21 0100  98 9 °F (37 2 °C)  102  16  125/86  99  90 %  28  2 L/min  Nasal cannula  Lying   09/20/21 1914  99 2 °F (37 3 °C)  103  18  127/82  --  94 %  --  --  --  --   09/20/21 11:28:35  99 4 °F (37 4 °C)  93  16  121/74  90  96 %  28  2 L/min  Nasal cannula  Sitting   09/20/21 07:36:36  99 3 °F (37 4 °C)  93  16  120/70  87  96 %  28  2 L/min  Nasal cannula       Pertinent Labs/Diagnostic Test Results:   9/19/21 CTA chest PE study   Findings as above suspicious for acute on chronic pulmonary embolus involving the right superior pulmonary artery and segmental branch   No bowing of the intraventricular septum or reflux of contrast into the hepatic veins/IVC to suggest associated right  heart strain  No consolidation or effusion  9/20/21 CXR Left midlung field linear atelectasis     Results from last 7 days   Lab Units 09/19/21 2042   SARS-COV-2  Negative     Results from last 7 days   Lab Units 09/20/21  0529 09/19/21  2041   WBC Thousand/uL 6 31 9 11   HEMOGLOBIN g/dL 8 0* 9 8*   HEMATOCRIT % 24 8* 30 6*   PLATELETS Thousands/uL 292 366   NEUTROS ABS Thousands/µL  --  5 95     Results from last 7 days   Lab Units 09/20/21  0529 09/19/21  2041   SODIUM mmol/L 141 136   POTASSIUM mmol/L 3 8 4 2   CHLORIDE mmol/L 106 97*   CO2 mmol/L 28 29   ANION GAP mmol/L 7 10   BUN mg/dL 13 17   CREATININE mg/dL 0 91 1 34*   EGFR ml/min/1 73sq m 67 42   CALCIUM mg/dL 8 1* 9 1   MAGNESIUM mg/dL 1 9  --      Results from last 7 days   Lab Units 09/19/21  2041   AST U/L 28   ALT U/L 28   ALK PHOS U/L 98   TOTAL PROTEIN g/dL 7 2   ALBUMIN g/dL 3 4*   TOTAL BILIRUBIN mg/dL 0 79     Results from last 7 days   Lab Units 09/20/21  0529 09/19/21 2041   GLUCOSE RANDOM mg/dL 98 107     Results from last 7 days   Lab Units 09/19/21 2041   TROPONIN I ng/mL <0 02     Results from last 7 days   Lab Units 09/19/21 2041   PROTIME seconds 12 3   INR  0 93   PTT seconds 26     Results from last 7 days   Lab Units 09/20/21  0529 09/19/21 2041   PROCALCITONIN ng/ml <0 05 0 05     Results from last 7 days   Lab Units 09/19/21 2041   LACTIC ACID mmol/L 1 1     Results from last 7 days   Lab Units 09/19/21 2258   CLARITY UA  Clear   COLOR UA  Yellow   SPEC GRAV UA  <=1 005   PH UA  6 0   GLUCOSE UA mg/dl Negative   KETONES UA mg/dl Negative   BLOOD UA  Negative   PROTEIN UA mg/dl Negative   NITRITE UA  Negative   BILIRUBIN UA  Negative   UROBILINOGEN UA E U /dl 0 2   LEUKOCYTES UA  Negative     Results from last 7 days   Lab Units 09/19/21 2259 09/19/21 2041 09/19/21 2026   BLOOD CULTURE   --  No Growth at 24 hrs  No Growth at 24 hrs     URINE CULTURE  No Growth <1000 cfu/mL  --   --        ED Treatment:   Medication Administration from 09/19/2021 1933 to 09/20/2021 0142       Date/Time Order Dose Route Action Action by Comments     09/19/2021 2145 sodium chloride 0 9 % bolus 1,000 mL 0 mL Intravenous Stopped Oral [address] [place]      09/19/2021 2045 sodium chloride 0 9 % bolus 1,000 mL 1,000 mL Intravenous [address] Cristiana Boone RN      09/19/2021 2039 acetaminophen (TYLENOL) tablet 650 mg 650 mg Oral Given Cristiana Boone RN      09/19/2021 2202 cefTRIAXone (ROCEPHIN) IVPB (premix in dextrose) 1,000 mg 50 mL 0 mg Intravenous Stopped Cristiana Boone RN      09/19/2021 2132 cefTRIAXone (ROCEPHIN) IVPB (premix in dextrose) 1,000 mg 50 mL 1,000 mg Intravenous [address] Oral Six [place], ZEKE      09/19/2021 2132 oxyCODONE (ROXICODONE) IR tablet 5 mg 5 mg Oral Given Cristiana Boone RN      09/19/2021 2236 iohexol (OMNIPAQUE) 350 MG/ML injection (SINGLE-DOSE) 100 mL 85 mL Intravenous Given Lenin Davis      09/19/2021 3371 enoxaparin (LOVENOX) subcutaneous injection 100 mg 100 mg Subcutaneous Given Susan Zapata RN         Past Medical History:   Diagnosis Date    Anemia     Cancer ([facility])     Degenerative joint disease     Disease of thyroid gland     underactive    FHx: non-Hodgkin's lymphoma     macroglobulin anemia    History of back surgery     History of chemotherapy 2014    lymphoma - [id number]    Lumbar disc disease      Present on Admission:   Acute pulmonary embolism without acute cor pulmonale (HCC)   Hypothyroid   JN (acute kidney injury) ([facility])   SIRS (systemic inflammatory response syndrome) ([facility])   Essential hypertension   Pain and swelling of right lower leg      Admitting Diagnosis: Cellulitis [L03 90]  Pulmonary embolism ([facility]) [I26 99]  Post op infection [T81 40XA]  Fever [R50 9]  Sepsis ([facility]) [A41 9]  Age/Sex: 61 y o  female  Admission Orders:  gmf  wbat  Elevate extremity   Scheduled Medications:  butalbital-acetaminophen-caffeine, 1 tablet, Oral, Once  calcium carbonate-vitamin D, 1 tablet, Oral, Daily With Breakfast  cefTRIAXone, 1,000 mg, Intravenous, Q24H  cholecalciferol, 3,000 Units, Oral, Daily  docusate sodium, 100 mg, Oral, BID  enoxaparin, 1 mg/kg, Subcutaneous, Q12H [place]OR[place]  gabapentin, 600 mg, Oral, TID  levothyroxine, 50 mcg, Oral, Early Morning  multivitamin-minerals, 1 tablet, Oral, Daily      Continuous IV Infusions:     PRN Meds:  acetaminophen, 650 mg, Oral, Q6H PRN x2  cyclobenzaprine, 5 mg, Oral, TID PRN  morphine injection, 4 mg, Intravenous, Q6H PRN  oxyCODONE, 10 mg, Oral, Q4H PRN x4  oxyCODONE, 5 mg, Oral, Q6H PRN x3  polyethylene glycol, 17 g, Oral, Daily PRN        IP CONSULT TO ORTHOPEDIC SURGERY    Network Utilization Review Department  ATTENTION: Please call with any questions or concerns to [phone] and carefully listen to the prompts so that you are directed to the right person   All voicemails are confidential   Tanna Albert all requests for admission clinical reviews, approved or denied determinations and any other requests to dedicated fax number below belonging to the campus where the patient is receiving treatment   List of dedicated fax numbers for the Facilities:  [address] [place] [address] DENIALS (Administrative/Medical Necessity) [phone]   [address] (Maternity/NICU/Pediatrics) [phone]   [address] [place] [address] [id number] [place] [address] [address] [facility] [place] [address] [phone]

## 2021-09-21 NOTE — ASSESSMENT & PLAN NOTE
Patient presented to the ER with complaints of right lower leg redness, swelling, pain and also shortness of breath  On CTA patient noted to have findings of acute on chronic PE  No obvious signs of right heart strain on CTA and ECHO  Patient placed on Lovenox during admission, will continue on d/c

## 2021-09-21 NOTE — ASSESSMENT & PLAN NOTE
Patient is status post right total knee replacement on 9/15 through [facility]  States she was discharged on 81 mg of aspirin daily which she has been compliant with  Follow-up with primary orthopedist after discharge  Patient currently weight-bearing as tolerated and ambulates with a walker  Continue Tylenol and oxycodone p r n  Ortho eval on 9/20 for R knee pain and swelling, suspect related to DVT  No evidence of post-op infection or cellulitis  Continue elevated, analgesia, weight bearing as tolerated

## 2021-09-22 VITALS
SYSTOLIC BLOOD PRESSURE: 118 MMHG | RESPIRATION RATE: 18 BRPM | OXYGEN SATURATION: 95 % | HEART RATE: 96 BPM | TEMPERATURE: 97.3 F | WEIGHT: 210 LBS | BODY MASS INDEX: 34.99 KG/M2 | DIASTOLIC BLOOD PRESSURE: 80 MMHG | HEIGHT: 65 IN

## 2021-09-22 NOTE — NURSING NOTE
Reviewed discharge AVS with patient and   Lovenox teaching with return demonstration completed  Pt understood the Lovenox teaching and discharge instructions  Pt discharged tolobby via wheelchair

## 2021-09-23 NOTE — UTILIZATION REVIEW
Notification of Discharge   This is a Notification of Discharge from our facility [address]  Please be advised that this patient has been discharge from our facility  Below you will find the admission and discharge date and time including the patients disposition  UTILIZATION REVIEW CONTACT:  Ping Fields  Utilization   Network Utilization Review Department  Phone: [phone] x carefully listen to the prompts  All voicemails are confidential   Email: [email or web address]     PHYSICIAN ADVISORY SERVICES:  FOR [id number] REVIEW - MEDICAL NECESSITY DENIAL  Phone: [phone]  Fax: [phone]  Email: [email or web address]     PRESENTATION DATE: 9/19/2021  7:59 PM  OBERVATION ADMISSION DATE:   INPATIENT ADMISSION DATE: 9/20/21 12:29 AM   DISCHARGE DATE: 9/21/2021  3:43 PM  DISPOSITION: Home/Self Care Home/Self Care      IMPORTANT INFORMATION:  Send all requests for admission clinical reviews, approved or denied determinations and any other requests to dedicated fax number below belonging to the campus where the patient is receiving treatment   List of dedicated fax numbers:  [address] DENIALS (Administrative/Medical Necessity) [phone]   [address] [place] [address] (Maternity/NICU/Pediatrics) [phone]   Larry Starr [phone]   Иван Geiger [phone]   Jackie Cline [phone]   [facility] [address] [place] [address] [phone]   [facility]  [phone]   [address] [facility], [facility]  2401 [facility] [address] [facility] [phone]

## 2021-09-25 LAB
BACTERIA BLD CULT: NORMAL
BACTERIA BLD CULT: NORMAL

## 2021-09-28 LAB
ATRIAL RATE: 107 BPM
P AXIS: 51 DEGREES
PR INTERVAL: 130 MS
QRS AXIS: 12 DEGREES
QRSD INTERVAL: 90 MS
QT INTERVAL: 340 MS
QTC INTERVAL: 453 MS
T WAVE AXIS: 28 DEGREES
VENTRICULAR RATE: 107 BPM

## 2021-09-28 PROCEDURE — 93010 ELECTROCARDIOGRAM REPORT: CPT | Performed by: INTERNAL MEDICINE

## 2021-09-29 ENCOUNTER — TELEPHONE (OUTPATIENT)
Dept: GASTROENTEROLOGY | Facility: CLINIC | Age: 66
End: 2021-09-29

## 2021-09-29 NOTE — TELEPHONE ENCOUNTER
Pt reports having constipation now since her knee surgery  She is taking a stool softener at night and miralax in the am She would like a call back to discuss

## 2021-10-25 ENCOUNTER — TELEPHONE (OUTPATIENT)
Dept: GASTROENTEROLOGY | Facility: CLINIC | Age: 66
End: 2021-10-25

## 2021-10-27 NOTE — ED PROVIDER NOTES
History  Chief Complaint   Patient presents with    Post-Op Infection     had R knee replacement four days ago  today started with increased pain, and redness to leg and running fever  last tylenol about noon     Please note: The original note from this visit was unable to be signed and then somehow deleted or lost so I am documenting this from memory to the best of my ability  60 yo female s/o right knee replacement by Dr Kathryn Acuna at [facility] 4 days ago c/o fever and increased pain and swelling of right leg today  She feels slightly sob  No chest pain  No vomiting or diarrhea  She is not on any blood thinners        History provided by:  Patient   used: No            Past Medical History:   Diagnosis Date    Anemia     Cancer ([facility] [address] )     Degenerative joint disease     Disease of thyroid gland     underactive    FHx: non-Hodgkin's lymphoma     macroglobulin anemia    History of back surgery     History of chemotherapy 2014    lymphoma - [id number]    Lumbar disc disease        Past Surgical History:   Procedure Laterality Date    ANKLE SURGERY      [id number]    BACK SURGERY  10/1996    BREAST BIOPSY Left 06/2012    benign    BUNIONECTOMY  08/18/2015    CARPAL TUNNEL RELEASE Left 03/1995    CARPAL TUNNEL RELEASE Right 12/1999    COLONOSCOPY      CT EPIDURAL STEROID INJECTION (TERRELL LUMBAR)  8/26/2016    DISCECTOMY SPINE CERVICAL ANTERIOR W/ ARTHROPLASTY      C3/C4    FOOT GANGLION EXCISION Left 10/27/2017    FOOT SURGERY  08/09/2011    ankle/foot sx    HEMORRHOID SURGERY  2009    JOINT REPLACEMENT      KNEE CARTILAGE SURGERY Right     MOUTH SURGERY      ROTATOR CUFF REPAIR Left 05/14/2008    AIME-EN-Y PROCEDURE  03/31/2008    TOTAL SHOULDER REPLACEMENT  06/24/2014    UPPER GASTROINTESTINAL ENDOSCOPY         Family History   Problem Relation Age of Onset    Diabetes Mother     Hypertension Mother     Stroke Mother     Stroke Father     Leukemia Father     Thyroid disease Sister     Stroke Brother     Heart disease Brother      I have reviewed and agree with the history as documented  E-Cigarette/Vaping    E-Cigarette Use Never User      E-Cigarette/Vaping Substances     Social History     Tobacco Use    Smoking status: Former Smoker    Smokeless tobacco: Never Used   Vaping Use    Vaping Use: Never used   Substance Use Topics    Alcohol use: Yes     Comment: liter of wine a night  none since about 5 days    Drug use: Yes     Types: Marijuana     Comment: Medical marijuana 03/01/2021       Review of Systems   Constitutional: Positive for fever  HENT: Negative  Eyes: Negative  Respiratory: Positive for shortness of breath  Negative for cough  Cardiovascular: Positive for leg swelling  Negative for chest pain  Gastrointestinal: Negative  Negative for abdominal pain, diarrhea, nausea and vomiting  Genitourinary: Negative  Negative for dysuria and flank pain  Musculoskeletal: Negative  Negative for back pain and myalgias  Skin: Positive for color change and wound  Negative for rash  Neurological: Negative  Negative for dizziness and headaches  Hematological: Does not bruise/bleed easily  Psychiatric/Behavioral: Negative  All other systems reviewed and are negative  Physical Exam  Physical Exam  Vitals and nursing note reviewed  Constitutional:       General: She is not in acute distress  Appearance: She is well-developed  She is not ill-appearing, toxic-appearing or diaphoretic  HENT:      Head: Normocephalic and atraumatic  Right Ear: External ear normal    Eyes:      General: No scleral icterus  Conjunctiva/sclera: Conjunctivae normal    Cardiovascular:      Rate and Rhythm: Normal rate and regular rhythm  Pulses: Normal pulses  Heart sounds: Normal heart sounds  No murmur heard  Pulmonary:      Effort: Pulmonary effort is normal  No respiratory distress  Breath sounds: Normal breath sounds  Abdominal:      General: Bowel sounds are normal  There is no distension  Palpations: Abdomen is soft  Tenderness: There is no abdominal tenderness  Musculoskeletal:         General: No deformity  Normal range of motion  Cervical back: Normal range of motion and neck supple  Right lower leg: Edema present  Skin:     General: Skin is warm and dry  Coloration: Skin is not pale  Findings: Erythema present  No rash  Neurological:      Mental Status: She is alert and oriented to person, place, and time  Cranial Nerves: No cranial nerve deficit     Psychiatric:         Mood and Affect: Mood normal          Behavior: Behavior normal          Vital Signs  ED Triage Vitals [09/19/21 1946]   Temperature Pulse Respirations Blood Pressure SpO2   (!) 102 °F (38 9 °C) (!) 12 20 133/69 97 %      Temp Source Heart Rate Source Patient Position - Orthostatic VS BP Location FiO2 (%)   Tympanic Monitor Sitting Left arm --      Pain Score       9           Vitals:    09/20/21 1128 09/20/21 1914 09/21/21 0100 09/21/21 0849   BP: 121/74 127/82 125/86 118/80   Pulse: 93 103 102 96   Patient Position - Orthostatic VS: Sitting  Lying          Visual Acuity      ED Medications  Medications   sodium chloride 0 9 % bolus 1,000 mL (0 mL Intravenous Stopped 9/19/21 2145)   acetaminophen (TYLENOL) tablet 650 mg (650 mg Oral Given 9/19/21 2039)   cefTRIAXone (ROCEPHIN) IVPB (premix in dextrose) 1,000 mg 50 mL (0 mg Intravenous Stopped 9/19/21 2202)   oxyCODONE (ROXICODONE) IR tablet 5 mg (5 mg Oral Given 9/19/21 2132)   iohexol (OMNIPAQUE) 350 MG/ML injection (SINGLE-DOSE) 100 mL (85 mL Intravenous Given 9/19/21 2236)   enoxaparin (LOVENOX) subcutaneous injection 100 mg (100 mg Subcutaneous Given 9/19/21 2338)   butalbital-acetaminophen-caffeine (FIORICET,ESGIC) -40 mg per tablet 1 tablet (1 tablet Oral Given 9/21/21 1157)       Diagnostic Studies  Results Reviewed     Procedure Component Value Units Date/Time    Blood culture #1 [[id number]] Collected: 09/19/21 2026    Lab Status: Final result Specimen: Blood from Hand, Left Updated: 09/25/21 0001     Blood Culture No Growth After 5 Days  Blood culture #2 [[id number]] Collected: 09/19/21 2041    Lab Status: Final result Specimen: Blood from Arm, Left Updated: 09/25/21 0001     Blood Culture No Growth After 5 Days  Urine culture [[id number]] Collected: 09/19/21 2259    Lab Status: Final result Specimen: Urine, Clean Catch Updated: 09/21/21 1036     Urine Culture No Growth <1000 cfu/mL    Procalcitonin Reflex [[id number]]  (Normal) Collected: 09/20/21 0529    Lab Status: Final result Specimen: Blood from Arm, Right Updated: 09/20/21 1101     Procalcitonin <0 05 ng/ml     Procalcitonin with AM Reflex [[id number]]  (Normal) Collected: 09/19/21 2041    Lab Status: Final result Specimen: Blood from Arm, Left Updated: 09/20/21 0050     Procalcitonin 0 05 ng/ml     UA (URINE) with reflex to Scope [[id number]] Collected: 09/19/21 2258    Lab Status: Final result Specimen: Urine, Clean Catch Updated: 09/19/21 2304     Color, UA Yellow     Clarity, UA Clear     Specific Gravity, UA <=1 005     pH, UA 6 0     Leukocytes, UA Negative     Nitrite, UA Negative     Protein, UA Negative mg/dl      Glucose, UA Negative mg/dl      Ketones, UA Negative mg/dl      Urobilinogen, UA 0 2 E U /dl      Bilirubin, UA Negative     Blood, UA Negative    Novel Coronavirus [facility] [[id number]]  (Normal) Collected: 09/19/21 2042    Lab Status: Final result Specimen: Nares from Nasopharyngeal Swab Updated: 09/19/21 2145     SARS-CoV-2 Negative    Narrative: The specimen collection materials, transport medium, and/or testing methodology utilized in the production of these test results have been proven to be reliable in a limited validation with an abbreviated program under the Emergency Utilization Authorization provided by the FDA    Testing reported as "Presumptive positive" will be confirmed with secondary testing to ensure result accuracy  Clinical caution and judgement should be used with the interpretation of these results with consideration of the clinical impression and other laboratory testing  Testing reported as "Positive" or "Negative" has been proven to be accurate according to standard laboratory validation requirements  All testing is performed with control materials showing appropriate reactivity at standard intervals  Comprehensive metabolic panel [[id number]]  (Abnormal) Collected: 09/19/21 2041    Lab Status: Final result Specimen: Blood from Arm, Left Updated: 09/19/21 2130     Sodium 136 mmol/L      Potassium 4 2 mmol/L      Chloride 97 mmol/L      CO2 29 mmol/L      ANION GAP 10 mmol/L      BUN 17 mg/dL      Creatinine 1 34 mg/dL      Glucose 107 mg/dL      Calcium 9 1 mg/dL      Corrected Calcium 9 6 mg/dL      AST 28 U/L      ALT 28 U/L      Alkaline Phosphatase 98 U/L      Total Protein 7 2 g/dL      Albumin 3 4 g/dL      Total Bilirubin 0 79 mg/dL      eGFR 42 ml/min/1 73sq m     Narrative:      [place] guidelines for Chronic Kidney Disease (CKD):     Stage 1 with normal or high GFR (GFR > 90 mL/min/1 73 square meters)    Stage 2 Mild CKD (GFR = 60-89 mL/min/1 73 square meters)    Stage 3A Moderate CKD (GFR = 45-59 mL/min/1 73 square meters)    Stage 3B Moderate CKD (GFR = 30-44 mL/min/1 73 square meters)    Stage 4 Severe CKD (GFR = 15-29 mL/min/1 73 square meters)    Stage 5 End Stage CKD (GFR <15 mL/min/1 73 square meters)  Note: GFR calculation is accurate only with a steady state creatinine    Lactic Acid [[id number]]  (Normal) Collected: 09/19/21 2041    Lab Status: Final result Specimen: Blood from Arm, Left Updated: 09/19/21 2115     LACTIC ACID 1 1 mmol/L     Narrative:      Result may be elevated if tourniquet was used during collection      Troponin I [[id number]]  (Normal) Collected: 09/19/21 2041    Lab Status: Final result Specimen: Blood from Arm, Left Updated: 09/19/21 2115     Troponin I <0 02 ng/mL     Protime-INR [[id number]]  (Normal) Collected: 09/19/21 2041    Lab Status: Final result Specimen: Blood from Arm, Left Updated: 09/19/21 2106     Protime 12 3 seconds      INR 0 93    APTT [[id number]]  (Normal) Collected: 09/19/21 2041    Lab Status: Final result Specimen: Blood from Arm, Left Updated: 09/19/21 2106     PTT 26 seconds     CBC and differential [[id number]]  (Abnormal) Collected: 09/19/21 2041    Lab Status: Final result Specimen: Blood from Arm, Left Updated: 09/19/21 2054     WBC 9 11 Thousand/uL      RBC 2 77 Million/uL      Hemoglobin 9 8 g/dL      Hematocrit 30 6 %       fL      MCH 35 4 pg      MCHC 32 0 g/dL      RDW 12 7 %      MPV 10 0 fL      Platelets 591 Thousands/uL      nRBC 0 /100 WBCs      Neutrophils Relative 65 %      Immat GRANS % 0 %      Lymphocytes Relative 14 %      Monocytes Relative 11 %      Eosinophils Relative 9 %      Basophils Relative 1 %      Neutrophils Absolute 5 95 Thousands/µL      Immature Grans Absolute 0 02 Thousand/uL      Lymphocytes Absolute 1 26 Thousands/µL      Monocytes Absolute 0 98 Thousand/µL      Eosinophils Absolute 0 83 Thousand/µL      Basophils Absolute 0 07 Thousands/µL                  VAS lower limb venous duplex study, unilateral/limited   Final Result by Goran Sotelo MD (09/20 1640)      CTA ED chest PE study   Final Result by Lynda Donahue MD (09/19 2327)      Findings as above suspicious for acute on chronic pulmonary embolus involving the right superior pulmonary artery and segmental branch  No bowing of the intraventricular septum or reflux of contrast into the hepatic veins/IVC to suggest associated right    heart strain  No consolidation or effusion  Discussed with Dr Юлия Carrasquillo at 18:27 PM on 8/16/6305 with verbal confirmation by the recipient        Workstation performed: [id number]         XR chest 1 view portable   ED Interpretation by Farhad Pitt MD (54/82 0104)   Poor inspiration; no definite infiltrate; atelectasis left      Final Result by Joanna Conley MD (09/20 4739)      Left midlung field linear atelectasis  Workstation performed: [id number]                    Procedures  Procedures         ED Course                                           MDM  Number of Diagnoses or Management Options  Cellulitis  Fever  Pulmonary embolism ([facility] [address] )  Sepsis ([facility] [address] )  Diagnosis management comments: Test results reviewed  Pt  Given IV abx and lovenox for PE  I attempted to contact Dr Joshua Nolan or his coverage through PACS, the PA on call refused to speak with me and advised [facility] transfer center that we could take care of this here so will admit here  Disposition  Final diagnoses:   Cellulitis   Fever   Pulmonary embolism ([facility] [address] )   Sepsis ([facility] [address] )     Time reflects when diagnosis was documented in both MDM as applicable and the Disposition within this note     Time User Action Codes Description Comment    4/19/6384 08:43 PM Farida Rodanthe Add [T73 43] Cellulitis     8/91/5786 37:19 PM Farida Rodanthe Add [U37 6] Fever     3/23/7553 42:44 PM Farida Rodanthe Add [F97 19] Pulmonary embolism ([facility] [address] )     8/18/8740 65:91 PM Farida Rodanthe Add [W59 3] Sepsis ([facility] [address] )     9/20/2021  4:31 PM Jessica Beltran Add [L83 547,  M79 89] Pain and swelling of right lower leg     9/21/2021 11:10 AM Thuy Crump Add [I26 99] Acute pulmonary embolism without acute cor pulmonale ([facility] [address] )     9/21/2021 11:52 AM Thuy Crump Add [U35 110] Status post right knee replacement       ED Disposition     ED Disposition Condition Date/Time Comment    Admit Stable Mon Sep 20, 2021 12:28 AM Case was discussed with [facility], hospitalist** and the patient's admission status was agreed to be Admission Status: inpatient status to the service of Dr Elvis Orellana**           Follow-up Information     Follow up With Specialties Details Why Contact Info Corrie Solorzano MD Internal Medicine Follow up in 1 week(s)  [address]  [address]  [phone]            Discharge Medication List as of 9/21/2021  2:11 PM      START taking these medications    Details   enoxaparin (Lovenox) 100 mg/mL Inject 1 mL (100 mg total) under the skin every 12 (twelve) hours, Starting Tue 9/21/2021, Until Thu 10/21/2021, Normal         CONTINUE these medications which have CHANGED    Details   acetaminophen (TYLENOL) 500 mg tablet Take 1 tablet (500 mg total) by mouth every 6 (six) hours as needed for mild pain, Starting Tue 9/21/2021, No Print      docusate sodium (COLACE) 100 mg capsule Take 1 capsule (100 mg total) by mouth 2 (two) times a day, Starting Tue 9/21/2021, No Print         CONTINUE these medications which have NOT CHANGED    Details   ascorbic acid (VITAMIN C) 250 MG tablet Take 100 mg by mouth daily , Historical Med      bepotastine besilate (BEPREVE) 1 5 % op soln Apply to eye daily, Historical Med      Calcium Carb-Ergocalciferol 250-125 MG-UNIT TABS calcium carb-ergocalciferol (vit D2) 250 mg (625 mg)-125 unit tablet   1 tbl by oral route , Historical Med      Cholecalciferol 100 MCG (4000 UT) CAPS cholecalciferol (vitamin D3) 25 mcg (1,000 unit) tablet   3000 units by oral route , Historical Med      colestipol (COLESTID) 1 g tablet Take 1 tablet (1 g total) by mouth 4 (four) times a day, Starting Fri 8/13/2021, Normal      cyclobenzaprine (FLEXERIL) 10 mg tablet 3 (three) times a day, Historical Med      gabapentin (NEURONTIN) 300 mg capsule take 2 capsules by mouth three times a day, Historical Med      levothyroxine 50 mcg tablet Take 50 mcg by mouth daily, Starting Tue 8/6/2019, Until Wed 11/11/2020, Historical Med      lisinopril-hydrochlorothiazide (PRINZIDE,ZESTORETIC) 10-12 5 MG per tablet lisinopril 10 mg-hydrochlorothiazide 12 5 mg tablet, Historical Med      Multiple Vitamins-Minerals (ONE DAILY WOMENS 50 PLUS PO) One Daily Womens 50 Plus, Historical Med      Omega 3-6-9 Fatty Acids (OMEGA 3-6-9 COMPLEX) CAPS omega 3,6,9 combination no 7 92 mg (43 mg-22 [id number]) chew tablet, Historical Med      OXYCODONE HCL PO Take 5 mg by mouth every 4 (four) hours as needed, Historical Med      Polyethylene Glycol 3350 (MIRALAX PO) Take by mouth as needed, Historical Med      Potassium (POTASSIMIN) 75 MG TABS Take by mouth, Historical Med      fexofenadine (ALLEGRA) 60 MG tablet Take 60 mg by mouth daily, Historical Med         STOP taking these medications       Aloe Vera 25 MG CAPS Comments:   Reason for Stopping:         denosumab (PROLIA) 60 mg/mL Comments:   Reason for Stopping:         dicyclomine (BENTYL) 10 mg capsule Comments:   Reason for Stopping:         famotidine (PEPCID) 20 mg tablet Comments:   Reason for Stopping:         hydrochlorothiazide (MICROZIDE) 12 5 mg capsule Comments:   Reason for Stopping:         lidocaine (LIDODERM) 5 % Comments:   Reason for Stopping:         linaCLOtide 145 MCG CAPS Comments:   Reason for Stopping:         pentoxifylline (TRENtal) 400 mg ER tablet Comments:   Reason for Stopping:         prochlorperazine (COMPAZINE) 10 mg tablet Comments:   Reason for Stopping:             Outpatient Discharge Orders   Discharge Diet     Activity as tolerated     Call provider for:  persistent nausea or vomiting     Call provider for:  severe uncontrolled pain     Call provider for: active or persistent bleeding     Call provider for:  difficulty breathing, headache or visual disturbances     Call provider for:  persistent dizziness or light-headedness       PDMP Review     None          ED Provider  Electronically Signed by           Jr Wheeler MD  05/51/68 1595

## 2021-12-13 ENCOUNTER — OFFICE VISIT (OUTPATIENT)
Dept: GASTROENTEROLOGY | Facility: CLINIC | Age: 66
End: 2021-12-13
Payer: COMMERCIAL

## 2021-12-13 VITALS
DIASTOLIC BLOOD PRESSURE: 70 MMHG | BODY MASS INDEX: 32.65 KG/M2 | SYSTOLIC BLOOD PRESSURE: 119 MMHG | WEIGHT: 196 LBS | HEART RATE: 126 BPM | HEIGHT: 65 IN

## 2021-12-13 DIAGNOSIS — K59.1 FUNCTIONAL DIARRHEA: Primary | ICD-10-CM

## 2021-12-13 DIAGNOSIS — Z98.84 BARIATRIC SURGERY STATUS: ICD-10-CM

## 2021-12-13 DIAGNOSIS — Z12.11 SCREENING FOR COLON CANCER: ICD-10-CM

## 2021-12-13 DIAGNOSIS — K21.9 GASTROESOPHAGEAL REFLUX DISEASE WITHOUT ESOPHAGITIS: ICD-10-CM

## 2021-12-13 PROCEDURE — 99214 OFFICE O/P EST MOD 30 MIN: CPT | Performed by: INTERNAL MEDICINE

## 2022-10-04 ENCOUNTER — APPOINTMENT (OUTPATIENT)
Dept: LAB | Facility: HOSPITAL | Age: 67
End: 2022-10-04
Payer: COMMERCIAL

## 2022-10-04 DIAGNOSIS — C88.0 WALDENSTROM MACROGLOBULINEMIA (HCC): ICD-10-CM

## 2022-10-04 PROCEDURE — 82595 ASSAY OF CRYOGLOBULIN: CPT

## 2022-10-04 PROCEDURE — 36415 COLL VENOUS BLD VENIPUNCTURE: CPT

## 2022-10-22 LAB
CRYOGLOB SER QL 1D COLD INC: ABNORMAL
SL AMB CRYOGLOBULIN %: 24 %
SL AMB IFE RESULT, CRYOPRECIPITANT: ABNORMAL

## 2023-07-14 ENCOUNTER — TELEPHONE (OUTPATIENT)
Age: 68
End: 2023-07-14

## 2023-07-14 NOTE — TELEPHONE ENCOUNTER
Patients GI provider:  Dr. Alo Mills    Number to return call: [phone]    Reason for call: Patient is calling to see whom provider would recommend for a hernia surgeon, specifically inguinal hernia.      Scheduled procedure/appointment date if applicable: N/A

## 2023-07-17 NOTE — TELEPHONE ENCOUNTER
I called and spoke to patient and gave her dr Orville Valdes recommendations. She verbalized understanding.

## 2023-07-17 NOTE — TELEPHONE ENCOUNTER
I called and spoke to patient. She states it is for her brother. She would like for the surgeon to be in [facility].

## 2023-11-15 ENCOUNTER — HOSPITAL ENCOUNTER (OUTPATIENT)
Dept: CT IMAGING | Facility: HOSPITAL | Age: 68
Discharge: HOME | End: 2023-11-15
Payer: COMMERCIAL

## 2023-11-15 DIAGNOSIS — R05.1 ACUTE COUGH: ICD-10-CM

## 2023-11-15 PROCEDURE — 71260 CT THORAX DX C+: CPT

## 2023-11-15 PROCEDURE — G1004 CDSM NDSC: HCPCS

## 2023-11-15 RX ADMIN — IOHEXOL 70 ML: 350 INJECTION, SOLUTION INTRAVENOUS at 15:15

## 2023-12-01 ENCOUNTER — TELEPHONE (OUTPATIENT)
Dept: GASTROENTEROLOGY | Facility: CLINIC | Age: 68
End: 2023-12-01

## 2023-12-01 NOTE — TELEPHONE ENCOUNTER
Spoke to pt when calling regarding her husbands recall and she is asking if it is better to follow up with you for her nexium refills since you are a GI doctor or if ok to continue to have filled with her pcp. Please advise. Thank you!

## 2023-12-04 NOTE — TELEPHONE ENCOUNTER
Called and spoke to pt and informed of Dr. Kylah Avilez' response. She will continue to have refilled by her pcp since she sees every 6 mos. She said that if she has any GI issues, however, she will give us a call.

## 2023-12-12 ENCOUNTER — APPOINTMENT (OUTPATIENT)
Dept: CT IMAGING | Facility: HOSPITAL | Age: 68
End: 2023-12-12
Payer: COMMERCIAL

## 2023-12-12 ENCOUNTER — HOSPITAL ENCOUNTER (OUTPATIENT)
Dept: CT IMAGING | Facility: HOSPITAL | Age: 68
Discharge: HOME | End: 2023-12-12
Payer: COMMERCIAL

## 2023-12-12 DIAGNOSIS — C88.0 WALDENSTROM MACROGLOBULINEMIA (HCC): ICD-10-CM

## 2023-12-12 PROCEDURE — G1004 CDSM NDSC: HCPCS

## 2023-12-12 PROCEDURE — 74177 CT ABD & PELVIS W/CONTRAST: CPT

## 2023-12-12 RX ADMIN — IOHEXOL 85 ML: 350 INJECTION, SOLUTION INTRAVENOUS at 14:15

## 2024-02-21 PROBLEM — R50.9 FEVER, UNKNOWN ORIGIN: Status: RESOLVED | Noted: 2020-11-11 | Resolved: 2024-02-21

## 2024-02-21 PROBLEM — Z12.11 SCREENING FOR COLON CANCER: Status: RESOLVED | Noted: 2020-09-09 | Resolved: 2024-02-21

## 2024-02-29 ENCOUNTER — TELEPHONE (OUTPATIENT)
Age: 69
End: 2024-02-29

## 2024-02-29 ENCOUNTER — NURSE TRIAGE (OUTPATIENT)
Age: 69
End: 2024-02-29

## 2024-02-29 NOTE — TELEPHONE ENCOUNTER
Pt called requesting an appointment for stomach issues. Called was transferred to [facility] nurse triage.

## 2024-02-29 NOTE — TELEPHONE ENCOUNTER
Pt calling in, reports she is having upper left abdominal pain below ribs after eating with bloating. She was on anti inflammatory medication end of December and it cause worsening symptoms. She is currently on nexium from PCP. I scheduled pt she has not been seen in office since 2021.   Reason for Disposition  • Information only question and nurse able to answer    Protocols used: Information Only Call - No Triage-ADULT-[place]

## 2024-03-12 ENCOUNTER — OFFICE VISIT (OUTPATIENT)
Dept: GASTROENTEROLOGY | Facility: CLINIC | Age: 69
End: 2024-03-12
Payer: COMMERCIAL

## 2024-03-12 ENCOUNTER — TELEPHONE (OUTPATIENT)
Dept: GASTROENTEROLOGY | Facility: CLINIC | Age: 69
End: 2024-03-12

## 2024-03-12 VITALS
HEART RATE: 93 BPM | BODY MASS INDEX: 31.09 KG/M2 | WEIGHT: 186.6 LBS | HEIGHT: 65 IN | SYSTOLIC BLOOD PRESSURE: 110 MMHG | DIASTOLIC BLOOD PRESSURE: 66 MMHG

## 2024-03-12 DIAGNOSIS — R10.12 LEFT UPPER QUADRANT ABDOMINAL PAIN: Primary | ICD-10-CM

## 2024-03-12 DIAGNOSIS — Z98.84 BARIATRIC SURGERY STATUS: ICD-10-CM

## 2024-03-12 DIAGNOSIS — Z12.11 COLON CANCER SCREENING: ICD-10-CM

## 2024-03-12 DIAGNOSIS — K21.9 GASTROESOPHAGEAL REFLUX DISEASE, UNSPECIFIED WHETHER ESOPHAGITIS PRESENT: ICD-10-CM

## 2024-03-12 PROBLEM — E66.01 MORBID (SEVERE) OBESITY DUE TO EXCESS CALORIES (HCC): Status: ACTIVE | Noted: 2024-03-12

## 2024-03-12 PROBLEM — D47.3 ESSENTIAL (HEMORRHAGIC) THROMBOCYTHEMIA (HCC): Status: ACTIVE | Noted: 2020-11-13

## 2024-03-12 PROCEDURE — 99214 OFFICE O/P EST MOD 30 MIN: CPT | Performed by: NURSE PRACTITIONER

## 2024-03-12 PROCEDURE — G2211 COMPLEX E/M VISIT ADD ON: HCPCS | Performed by: NURSE PRACTITIONER

## 2024-03-12 RX ORDER — ALLOPURINOL 100 MG/1
100 TABLET ORAL DAILY
COMMUNITY

## 2024-03-12 RX ORDER — FEXOFENADINE HCL 60 MG/1
60 TABLET, FILM COATED ORAL DAILY
COMMUNITY

## 2024-03-12 RX ORDER — UREA 10 %
1 LOTION (ML) TOPICAL
COMMUNITY

## 2024-03-12 RX ORDER — SUCRALFATE 1 G/1
1 TABLET ORAL
Qty: 270 TABLET | Refills: 2 | Status: SHIPPED | OUTPATIENT
Start: 2024-03-12 | End: 2024-06-10

## 2024-03-12 NOTE — TELEPHONE ENCOUNTER
Procedure: EGD  Scheduled date of procedure (as of today):04/29/24  Physician performing procedure:Dr. Rich  Location of procedure:[facility]  Instructions reviewed with patient by: ti  Clearances: n/a

## 2024-03-12 NOTE — PROGRESS NOTES
[facility] - Outpatient Follow-up Note  Anisa Long 69 y.o. female MRN: [id number]  Encounter: [id number]          ASSESSMENT AND PLAN:      1. Left upper quadrant abdominal pain  2. Gastroesophageal reflux disease, unspecified whether esophagitis present  Patient is having left upper quadrant abdominal pain associated with acid reflux and heartburn which started in December after being on NSAIDs which were prescribed by physician.  Patient had previous gastric bypass surgery done and was informed previously not to take NSAIDs due to risk of ulceration and bleeding.  Patient reported that the physician told her it was okay to take NSAIDs for her treatment so she followed his advice.  Patient was later started on Nexium by her PCP to provide some protection to her stomach.  Differential diagnosis include ulceration, H. pylori, lesion, erosions, esophagitis.  - EGD; scheduled for EGD.  Prep and procedure explained to patient in detail.  Further recommendations pending results of EGD.  -Follow antireflux diet and measures  -Continue Nexium 40 Mg daily  -Avoid NSAIDs  - sucralfate (CARAFATE) 1 g tablet; Take 1 tablet (1 g total) by mouth 3 (three) times a day before meals  Dispense: 270 tablet; Refill: 2    3. Bariatric surgery status  Patient has history of Gia-en-Y gastric bypass.    4. Colon cancer screening  Up to date   Patient due for repeat colonoscopy 6/2026.    Follow-up after procedure.  ______________________________________________________________________    SUBJECTIVE:  Anisa Long is a pleasant 69-year-old female office for follow-up.  Patient has history of non-Hodgkin's lymphoma Waldenstrom which was previously in remission but is active and she follows with Dr. Ramos and is currently receiving treatment.  Patient is having left upper quadrant abdominal pain associated with acid reflux and heartburn which started in December after being on  NSAIDs which were prescribed by physician.  Patient had previous gastric bypass surgery done and was informed previously not to take NSAIDs due to risk of ulceration and bleeding.  Patient reported that the physician told her it was okay to take NSAIDs for her treatment so she followed his advice.  Patient was later started on Nexium by her PCP to provide some protection to her stomach.  Patient denies nausea, vomiting, dysphagia, or lower abdominal pain.  Patient denies blood in stool, blood from rectal area, or black tarry stool.  Bowel patterns are regular.  Abdomen exam positive for tenderness in upper abdomen greatest in left upper quadrant of abdomen.    Colonoscopy done 6/10/2021 showed visualized ileum and colonic mucosa was normal the prep was suboptimal.  Random transverse biopsies obtained to evaluate for microscopic colitis.  Biopsy benign, no microscopic colitis.  EGD done 3/4/2020 Showed inflammation of the gastric pouch.  No evidence of marginal ulceration.  No evidence of hiatal hernia.  Status post laparoscopic Gia-en-Y gastric bypass with small pouch.  Biopsy showed no H. pylori.  Negative for intestinal metaplasia, dysplasia, or carcinoma.  Mild chronic inactive gastritis.      REVIEW OF SYSTEMS IS OTHERWISE NEGATIVE.      Historical Information   Past Medical History:   Diagnosis Date    Anemia     Cancer (HCC)     Colon polyp     Degenerative joint disease     Disease of thyroid gland     underactive    FHx: non-Hodgkin's lymphoma     macroglobulin anemia    GERD (gastroesophageal reflux disease)     History of back surgery     History of chemotherapy 2014    lymphoma - [id number]    Lumbar disc disease      Past Surgical History:   Procedure Laterality Date    ANKLE SURGERY      2004,2005,2006    BACK SURGERY  10/1996    BREAST BIOPSY Left 06/2012    benign    BUNIONECTOMY  08/18/2015    CARPAL TUNNEL RELEASE Left 03/1995    CARPAL TUNNEL RELEASE Right 12/1999    COLONOSCOPY      CT EPIDURAL  STEROID INJECTION (TERRELL LUMBAR)  8/26/2016    DISCECTOMY SPINE CERVICAL ANTERIOR W/ ARTHROPLASTY      C3/C4    FOOT GANGLION EXCISION Left 10/27/2017    FOOT SURGERY  08/09/2011    ankle/foot sx    HEMORRHOID SURGERY  2009    JOINT REPLACEMENT      KNEE CARTILAGE SURGERY Right     MOUTH SURGERY      ROTATOR CUFF REPAIR Left 05/14/2008    AIME-EN-Y PROCEDURE  03/31/2008    TOTAL SHOULDER REPLACEMENT  06/24/2014    UPPER GASTROINTESTINAL ENDOSCOPY       Social History   Social History     Substance and Sexual Activity   Alcohol Use Not Currently     Social History     Substance and Sexual Activity   Drug Use Yes    Types: Marijuana    Comment: Medical marijuana 03/01/2021     Social History     Tobacco Use   Smoking Status Former   Smokeless Tobacco Never     Family History   Problem Relation Age of Onset    Diabetes Mother     Hypertension Mother     Stroke Mother     Stroke Father     Leukemia Father     Thyroid disease Sister     Stroke Brother     Heart disease Brother        Meds/Allergies       Current Outpatient Medications:     acetaminophen (TYLENOL) 500 mg tablet    allopurinol (ZYLOPRIM) 100 mg tablet    ascorbic acid (VITAMIN C) 250 MG tablet    bepotastine besilate (BEPREVE) 1.5 % op soln    Calcium Carb-Ergocalciferol 250-125 MG-UNIT TABS    Cholecalciferol 100 MCG (4000 UT) CAPS    colestipol (COLESTID) 1 g tablet    cyclobenzaprine (FLEXERIL) 10 mg tablet    fexofenadine (Allegra Allergy) 60 MG tablet    gabapentin (NEURONTIN) 300 mg capsule    levothyroxine 50 mcg tablet    lisinopril-hydrochlorothiazide (PRINZIDE,ZESTORETIC) 10-12.5 MG per tablet    melatonin 1 mg    Multiple Vitamins-Minerals (ONE DAILY WOMENS 50 PLUS PO)    Omega 3-6-9 Fatty Acids (OMEGA 3-6-9 COMPLEX) CAPS    OXYCODONE HCL PO    Polyethylene Glycol 3350 (MIRALAX PO)    Potassium (POTASSIMIN) 75 MG TABS    sucralfate (CARAFATE) 1 g tablet    vitamin E (TOCOPHEROL) capsule    Allergies   Allergen Reactions    Avelox [Moxifloxacin]  "Other (See Comments)     shock    Cefaclor Other (See Comments)    Ciprofloxacin     Nsaids Other (See Comments)     NSAIDs are contraindicated due to history of gastric bypass.     Sulfa Antibiotics GI Intolerance    Sulfamethoxazole-Trimethoprim Other (See Comments)    Talwin [Pentazocine] Dizziness    Vicodin [Hydrocodone-Acetaminophen] GI Intolerance    Ondansetron Rash           Objective     Blood pressure 110/66, pulse 93, height 5' 5\" (1.651 m), weight 84.6 kg (186 lb 9.6 oz). Body mass index is 31.05 kg/m².      PHYSICAL EXAM:      General Appearance:   Alert, cooperative, no distress   HEENT:   Normocephalic, atraumatic, anicteric.     Neck:  Supple, symmetrical, trachea midline   Lungs:   Clear to auscultation bilaterally; no rales, rhonchi or wheezing; respirations unlabored    Heart::   Regular rate and rhythm; no murmur, rub, or gallop.   Abdomen:   Soft, tender in upper abdomen with palpation greatest in LUQ, non-distended; normal bowel sounds; no masses, no organomegaly    Genitalia:   Deferred    Rectal:   Deferred    Extremities:  No cyanosis, clubbing or edema    Pulses:  2+ and symmetric    Skin:  No jaundice, rashes, or lesions    Lymph nodes:  No palpable cervical lymphadenopathy        Lab Results:   No visits with results within 1 Day(s) from this visit.   Latest known visit with results is:   Appointment on 10/04/2022   Component Date Value    Cryoglobulin, Ql, Serum,* 10/04/2022 Comment (A)     JOAQUIN Result, Cryoprecipit* 10/04/2022 Comment (A)     Cryoglobulin % 10/04/2022 24.0 (H)          Radiology Results:   No results found.  "

## 2024-04-15 ENCOUNTER — ANESTHESIA (OUTPATIENT)
Dept: ANESTHESIOLOGY | Facility: HOSPITAL | Age: 69
End: 2024-04-15

## 2024-04-15 ENCOUNTER — ANESTHESIA EVENT (OUTPATIENT)
Dept: ANESTHESIOLOGY | Facility: HOSPITAL | Age: 69
End: 2024-04-15

## 2024-04-22 ENCOUNTER — TELEPHONE (OUTPATIENT)
Dept: GASTROENTEROLOGY | Facility: CLINIC | Age: 69
End: 2024-04-22

## 2024-04-22 NOTE — TELEPHONE ENCOUNTER
lmom confirming pt's egd  scheduled on 4/29/24 at [facility] with Dr Rich .  Informed [facility] would be calling this pt with the arrival time  Informed would need a  the day of the procedure due to being under sedation. I asked pt to please call back if has not received instructions or if has any questions.

## 2024-04-29 ENCOUNTER — HOSPITAL ENCOUNTER (OUTPATIENT)
Dept: GASTROENTEROLOGY | Facility: AMBULARY SURGERY CENTER | Age: 69
Setting detail: OUTPATIENT SURGERY
Discharge: HOME | End: 2024-04-29
Attending: INTERNAL MEDICINE
Payer: COMMERCIAL

## 2024-04-29 ENCOUNTER — ANESTHESIA EVENT (OUTPATIENT)
Dept: GASTROENTEROLOGY | Facility: AMBULARY SURGERY CENTER | Age: 69
End: 2024-04-29

## 2024-04-29 ENCOUNTER — ANESTHESIA (OUTPATIENT)
Dept: GASTROENTEROLOGY | Facility: AMBULARY SURGERY CENTER | Age: 69
End: 2024-04-29

## 2024-04-29 VITALS
RESPIRATION RATE: 18 BRPM | TEMPERATURE: 97.3 F | DIASTOLIC BLOOD PRESSURE: 62 MMHG | OXYGEN SATURATION: 97 % | SYSTOLIC BLOOD PRESSURE: 93 MMHG | HEART RATE: 72 BPM

## 2024-04-29 DIAGNOSIS — R10.12 LEFT UPPER QUADRANT ABDOMINAL PAIN: ICD-10-CM

## 2024-04-29 DIAGNOSIS — K21.9 GASTROESOPHAGEAL REFLUX DISEASE, UNSPECIFIED WHETHER ESOPHAGITIS PRESENT: ICD-10-CM

## 2024-04-29 PROCEDURE — 88312 SPECIAL STAINS GROUP 1: CPT | Performed by: PATHOLOGY

## 2024-04-29 PROCEDURE — 88305 TISSUE EXAM BY PATHOLOGIST: CPT | Performed by: PATHOLOGY

## 2024-04-29 PROCEDURE — 43239 EGD BIOPSY SINGLE/MULTIPLE: CPT | Performed by: INTERNAL MEDICINE

## 2024-04-29 RX ORDER — SODIUM CHLORIDE, SODIUM LACTATE, POTASSIUM CHLORIDE, CALCIUM CHLORIDE 600; 310; 30; 20 MG/100ML; MG/100ML; MG/100ML; MG/100ML
INJECTION, SOLUTION INTRAVENOUS CONTINUOUS PRN
Status: DISCONTINUED | OUTPATIENT
Start: 2024-04-29 | End: 2024-04-29 | Stop reason: HOSPADM

## 2024-04-29 RX ORDER — PROPOFOL 10 MG/ML
INJECTION, EMULSION INTRAVENOUS AS NEEDED
Status: DISCONTINUED | OUTPATIENT
Start: 2024-04-29 | End: 2024-04-29 | Stop reason: HOSPADM

## 2024-04-29 RX ORDER — SODIUM CHLORIDE, SODIUM LACTATE, POTASSIUM CHLORIDE, CALCIUM CHLORIDE 600; 310; 30; 20 MG/100ML; MG/100ML; MG/100ML; MG/100ML
125 INJECTION, SOLUTION INTRAVENOUS CONTINUOUS
Status: DISCONTINUED | OUTPATIENT
Start: 2024-04-29 | End: 2024-05-03 | Stop reason: HOSPADM

## 2024-04-29 RX ORDER — LIDOCAINE HYDROCHLORIDE 10 MG/ML
INJECTION, SOLUTION EPIDURAL; INFILTRATION; INTRACAUDAL; PERINEURAL AS NEEDED
Status: DISCONTINUED | OUTPATIENT
Start: 2024-04-29 | End: 2024-04-29 | Stop reason: HOSPADM

## 2024-04-29 RX ADMIN — SODIUM CHLORIDE, SODIUM LACTATE, POTASSIUM CHLORIDE, AND CALCIUM CHLORIDE 125 ML/HR: .6; .31; .03; .02 INJECTION, SOLUTION INTRAVENOUS at 09:48

## 2024-04-29 RX ADMIN — PROPOFOL 50 MG: 10 INJECTION, EMULSION INTRAVENOUS at 10:08

## 2024-04-29 RX ADMIN — LIDOCAINE HYDROCHLORIDE 50 MG: 10 INJECTION, SOLUTION EPIDURAL; INFILTRATION; INTRACAUDAL; PERINEURAL at 10:04

## 2024-04-29 RX ADMIN — SODIUM CHLORIDE, SODIUM LACTATE, POTASSIUM CHLORIDE, AND CALCIUM CHLORIDE: .6; .31; .03; .02 INJECTION, SOLUTION INTRAVENOUS at 09:52

## 2024-04-29 RX ADMIN — PROPOFOL 100 MG: 10 INJECTION, EMULSION INTRAVENOUS at 10:04

## 2024-04-29 NOTE — ANESTHESIA PREPROCEDURE EVALUATION
Procedure:  EGD    Relevant Problems   CARDIO   (+) Essential hypertension   (+) Right leg DVT (HCC)      ENDO   (+) Hypothyroid      GI/HEPATIC   (+) Gastroesophageal reflux disease      HEMATOLOGY   (+) Macrocytic anemia      MUSCULOSKELETAL   (+) Scoliosis of thoracolumbar spine      NEURO/PSYCH   (+) Headache      Oncology   (+) History of non-Hodgkin's lymphoma      Surgery/Wound/Pain   (+) Bariatric surgery status        Physical Exam    Airway    Mallampati score: II  TM Distance: >3 FB  Neck ROM: full     Dental   Comment: Denies loose teeth     Cardiovascular  Cardiovascular exam normal    Pulmonary  Pulmonary exam normal     Other Findings  Portions of exam deferred due to low yield and/or unknown COVID statuspost-pubertal.      Anesthesia Plan  ASA Score- 2     Anesthesia Type- IV sedation with anesthesia with ASA Monitors.         Additional Monitors:     Airway Plan:            Plan Factors-Exercise tolerance (METS): >4 METS.    Chart reviewed.   Existing labs reviewed. Patient summary reviewed.    Patient is not a current smoker.              Induction- intravenous.    Postoperative Plan-     Informed Consent- Anesthetic plan and risks discussed with patient.  I personally reviewed this patient with the CRNA. Discussed and agreed on the Anesthesia Plan with the CRNA..

## 2024-04-29 NOTE — H&P
History and Physical - [facility]  Anisa Long 69 y.o. female MRN: [id number]                  HPI: Anisa Long is a 69 y.o. year old female who presents for abdominal pain      REVIEW OF SYSTEMS: Per the HPI, and otherwise unremarkable.    Historical Information   Past Medical History:   Diagnosis Date    Anemia     Cancer (HCC)     Colon polyp     Degenerative joint disease     Disease of thyroid gland     underactive    FHx: non-Hodgkin's lymphoma     macroglobulin anemia    GERD (gastroesophageal reflux disease)     History of back surgery     History of chemotherapy 2014    lymphoma - [id number]    Lumbar disc disease     Shingles 02/02/2024     Past Surgical History:   Procedure Laterality Date    ANKLE SURGERY      2004,2005,2006    BACK SURGERY  10/1996    BREAST BIOPSY Left 06/2012    benign    BUNIONECTOMY  08/18/2015    CARPAL TUNNEL RELEASE Left 03/1995    CARPAL TUNNEL RELEASE Right 12/1999    COLONOSCOPY      CT EPIDURAL STEROID INJECTION (TERRELL LUMBAR)  8/26/2016    DISCECTOMY SPINE CERVICAL ANTERIOR W/ ARTHROPLASTY      C3/C4    FOOT GANGLION EXCISION Left 10/27/2017    FOOT SURGERY  08/09/2011    ankle/foot sx    HEMORRHOID SURGERY  2009    JOINT REPLACEMENT      KNEE CARTILAGE SURGERY Right     MOUTH SURGERY      ROTATOR CUFF REPAIR Left 05/14/2008    AIME-EN-Y PROCEDURE  03/31/2008    TOTAL SHOULDER REPLACEMENT  06/24/2014    UPPER GASTROINTESTINAL ENDOSCOPY       Social History   Social History     Substance and Sexual Activity   Alcohol Use Not Currently     Social History     Substance and Sexual Activity   Drug Use Yes    Types: Marijuana    Comment: Medical marijuana 03/01/2021     Social History     Tobacco Use   Smoking Status Former   Smokeless Tobacco Never     Family History   Problem Relation Age of Onset    Diabetes Mother     Hypertension Mother     Stroke Mother     Stroke Father     Leukemia Father     Thyroid disease Sister      Stroke Brother     Heart disease Brother        Meds/Allergies       Current Outpatient Medications:     allopurinol (ZYLOPRIM) 100 mg tablet    ascorbic acid (VITAMIN C) 250 MG tablet    B Complex-Biotin-FA (B Complete) TABS    Biotin 1000 MCG CHEW    Calcium Carb-Ergocalciferol 250-125 MG-UNIT TABS    Cholecalciferol 100 MCG (4000 UT) CAPS    cyclobenzaprine (FLEXERIL) 10 mg tablet    docusate sodium (COLACE) 100 mg capsule    Ferrous Sulfate Dried (FERROUS SULFATE CR PO)    fexofenadine (Allegra Allergy) 60 MG tablet    gabapentin (NEURONTIN) 300 mg capsule    Lactobacillus (ACIDOPHOLUS PO)    levothyroxine 50 mcg tablet    lisinopril-hydrochlorothiazide (PRINZIDE,ZESTORETIC) 10-12.5 MG per tablet    melatonin 1 mg    Multiple Vitamins-Minerals (ONE DAILY WOMENS 50 PLUS PO)    Omega 3-6-9 Fatty Acids (OMEGA 3-6-9 COMPLEX) CAPS    OXYCODONE HCL PO    Polyethylene Glycol 3350 (MIRALAX PO)    Potassium (POTASSIMIN) 75 MG TABS    sucralfate (CARAFATE) 1 g tablet    vitamin E (TOCOPHEROL) capsule    acetaminophen (TYLENOL) 500 mg tablet    bepotastine besilate (BEPREVE) 1.5 % op soln    colestipol (COLESTID) 1 g tablet    Current Facility-Administered Medications:     lactated ringers infusion, 125 mL/hr, Intravenous, Continuous, 125 mL/hr at 04/29/24 0948    Allergies   Allergen Reactions    Avelox [Moxifloxacin] Other (See Comments)     shock    Cefaclor Other (See Comments)    Ciprofloxacin     Nsaids Other (See Comments)     NSAIDs are contraindicated due to history of gastric bypass.     Sulfa Antibiotics GI Intolerance    Sulfamethoxazole-Trimethoprim Other (See Comments)    Talwin [Pentazocine] Dizziness    Vicodin [Hydrocodone-Acetaminophen] GI Intolerance    Ondansetron Rash       Objective     BP 91/54   Pulse 92   Temp (!) 97.3 °F (36.3 °C) (Temporal)   Resp 18   SpO2 99%       PHYSICAL EXAM    Gen: NAD  Head: NCAT  CV: RRR  CHEST: Clear  ABD: soft, NT/ND  EXT: no edema      ASSESSMENT/PLAN:  This is a  69 y.o. year old female here for EGD, and she is stable and optimized for her procedure.

## 2024-04-29 NOTE — ANESTHESIA POSTPROCEDURE EVALUATION
Post-Op Assessment Note    CV Status:  Stable  Pain Score: 0    Pain management: adequate       Mental Status:  Sleepy   PONV Controlled:  None   Airway Patency:  Patent     Post Op Vitals Reviewed: Yes    No anethesia notable event occurred.    Staff: CRNA               BP 92/58 (04/29/24 1014)    Temp      Pulse 66 (04/29/24 1014)   Resp 18 (04/29/24 1014)    SpO2 100 % (04/29/24 1014)

## 2024-05-02 PROCEDURE — 88305 TISSUE EXAM BY PATHOLOGIST: CPT | Performed by: PATHOLOGY

## 2024-05-02 PROCEDURE — 88312 SPECIAL STAINS GROUP 1: CPT | Performed by: PATHOLOGY

## 2024-05-22 ENCOUNTER — TELEPHONE (OUTPATIENT)
Age: 69
End: 2024-05-22

## 2024-05-22 ENCOUNTER — TELEPHONE (OUTPATIENT)
Dept: GASTROENTEROLOGY | Facility: CLINIC | Age: 69
End: 2024-05-22

## 2024-05-22 DIAGNOSIS — K59.09 CHRONIC CONSTIPATION: Primary | ICD-10-CM

## 2024-05-22 NOTE — TELEPHONE ENCOUNTER
I called patient and she is aware that medication was sent to Pharmacy. No further action needed.

## 2024-05-22 NOTE — TELEPHONE ENCOUNTER
Dr Rich, I spoke to patient regarding a medication refill and she inquired about the results of her recent EGD.  Can you please look at results and advise. They are resulted in Media  . She states she does not use her Mychart and would like a call back. Thank you.

## 2024-05-22 NOTE — TELEPHONE ENCOUNTER
Patient called the RX Refill Line. Message is being forwarded to the office.     Patient is requesting a refill of linaCLOtide (Linzess) 145 MCG CAPS. Would like that sent to [facility] for a 90 day supply. Not on active med list to que up.     Please contact patient at [phone] if any issues.

## 2024-05-22 NOTE — TELEPHONE ENCOUNTER
Patient called the RX Refill Line. Message is being forwarded to the office.     Patient is requesting the results from her Endoscopy. Has not heard anything. Was done on 4/29/24.    Please contact patient at [phone]

## 2024-05-23 ENCOUNTER — TELEPHONE (OUTPATIENT)
Dept: GASTROENTEROLOGY | Facility: CLINIC | Age: 69
End: 2024-05-23

## 2024-05-23 NOTE — TELEPHONE ENCOUNTER
Spoke to patient and reviewed results. Advise pt to call office if symptoms persist or worsen. Pt scheduled 7/10 for F/U

## 2024-05-23 NOTE — TELEPHONE ENCOUNTER
----- Message from Valeriano Rich MD sent at 5/23/2024 11:40 AM EDT -----  Please call the patient regarding his result.  Biopsies all unremarkable.  There is some suggestion of likely acid reflux, but no evidence of significant esophagitis, no H. pylori, no celiac disease.  Continue Nexium.  If still having significant abdominal discomfort would recommend small bowel follow-through

## 2024-07-10 ENCOUNTER — OFFICE VISIT (OUTPATIENT)
Dept: GASTROENTEROLOGY | Facility: CLINIC | Age: 69
End: 2024-07-10
Payer: COMMERCIAL

## 2024-07-10 VITALS
WEIGHT: 183 LBS | SYSTOLIC BLOOD PRESSURE: 119 MMHG | HEART RATE: 83 BPM | HEIGHT: 65 IN | DIASTOLIC BLOOD PRESSURE: 83 MMHG | BODY MASS INDEX: 30.49 KG/M2

## 2024-07-10 DIAGNOSIS — Z98.84 BARIATRIC SURGERY STATUS: ICD-10-CM

## 2024-07-10 DIAGNOSIS — K21.9 GASTROESOPHAGEAL REFLUX DISEASE, UNSPECIFIED WHETHER ESOPHAGITIS PRESENT: Primary | ICD-10-CM

## 2024-07-10 DIAGNOSIS — Z12.11 COLON CANCER SCREENING: ICD-10-CM

## 2024-07-10 PROCEDURE — 99214 OFFICE O/P EST MOD 30 MIN: CPT | Performed by: PHYSICIAN ASSISTANT

## 2024-07-10 RX ORDER — ZANUBRUTINIB 80 MG/1
CAPSULE, GELATIN COATED ORAL
COMMUNITY

## 2024-07-10 NOTE — PROGRESS NOTES
[facility] - Outpatient Follow-up Note  Anisa Long 69 y.o. female MRN: [id number]  Encounter: [id number]          ASSESSMENT AND PLAN:      1. Gastroesophageal reflux disease, unspecified whether esophagitis present  Patient with history of GERD, can try to taper off the omeprazole taking it every other day for 2 weeks, can take Carafate as needed    2. Bariatric surgery status  Status post Gia-en-Y gastric bypass with no significant abnormality on recent EGD    3. Colon cancer screening  Patient due for colonoscopy in 2026, last 1 performed in 2021 did not reveal any polyps and random biopsies did not show any significant histopathologic change    We will follow-up in 1 year unless otherwise indicated    ______________________________________________________________________    SUBJECTIVE:    69-year-old female who presents today for follow-up office visit to upper endoscopy.  Did have a EGD back in April and did have some abnormal exudate in the esophagus and biopsy was negative for Candida.  She reports that she was having severe pain in the epigastric area/left upper quadrant I was doubling her over and that has since resolved.  Did have CTA back in April which was unremarkable.  Patient did have history of diarrhea and then also has a history of constipation but now she reports that she has been taking iron 3 times a week and following with Dr. Ramos for Waldenström's macroglobulinemia.  She otherwise reports reports that she takes a stool softener every other day but otherwise does not need to take Linzess or MiraLAX on a regular basis.  She has been taking generic omeprazole which she believes is a 40 mg although I do not see a prescription for this and taking Carafate as needed but she has no significant reflux symptoms and she was taking NSAIDs back in March which prompted her to start having issues.  She is status post gastric bypass and this is  "contraindicated.            EGD 4/24  IMPRESSION:  Abnormal mucosa with exudate in the esophagus; performed cold forceps biopsy  Previous Gia-en-Y gastric bypass in the stomach; performed cold forceps biopsy  The jejunum appeared normal. Performed random biopsy.    Path-    A. Esophagus, \"eiopsies distal esophagus-check for Candida,\" Biopsy:  - Benign squamous mucosa with reactive changes and focal acute inflammation  - Negative for intestinal metaplasia  - Eosinophils are fewer than 3 cells per high power field in squamous epithelium, negative for eosinophilic esophagitis  - No epithelial dysplasia and no evidence of malignancy  - GMS histochemistry negative for fungal elements     B. Stomach, \"Biopsies gastric pouch-check for H. pylori,\" Biopsy:  - Antral mucosa with mild chronic inactive gastritis  - Negative for intestinal metaplasia  - Negative for curvilinear Helicobacter microorganisms on H&E     C. Small intestine, \"Biopsies jejunum-check for celiac's,\" Biopsy:  - Fragments of small intestinal mucosa with intact villous architecture and no increase in intraepithelial lymphocytes  - Negative for chronic or active duodenitis, dysplasia or malignancy                  Colonoscopy 2021-  IMPRESSION:  Visualized ileal and colonic mucosa was normal, though prep was suboptimal.  Random transverse biopsies obtained to evaluate for microscopic colitis     RECOMMENDATION:  Await pathology. Preliminary recommendation repeat in 5 yrs.  Follow-up with me in several weeks    Path-no significant histopathologic change         REVIEW OF SYSTEMS IS OTHERWISE NEGATIVE.      Historical Information   Past Medical History:   Diagnosis Date    Anemia     Cancer (HCC)     Colon polyp     Degenerative joint disease     Disease of thyroid gland     underactive    FHx: non-Hodgkin's lymphoma     macroglobulin anemia    GERD (gastroesophageal reflux disease)     History of back surgery     History of chemotherapy 2014    lymphoma - " [id number]    Lumbar disc disease     Shingles 02/02/2024     Past Surgical History:   Procedure Laterality Date    ANKLE SURGERY      2004,2005,2006    BACK SURGERY  10/1996    BREAST BIOPSY Left 06/2012    benign    BUNIONECTOMY  08/18/2015    CARPAL TUNNEL RELEASE Left 03/1995    CARPAL TUNNEL RELEASE Right 12/1999    COLONOSCOPY      CT EPIDURAL STEROID INJECTION (TERRELL LUMBAR)  8/26/2016    DISCECTOMY SPINE CERVICAL ANTERIOR W/ ARTHROPLASTY      C3/C4    FOOT GANGLION EXCISION Left 10/27/2017    FOOT SURGERY  08/09/2011    ankle/foot sx    HEMORRHOID SURGERY  2009    JOINT REPLACEMENT      KNEE CARTILAGE SURGERY Right     MOUTH SURGERY      ROTATOR CUFF REPAIR Left 05/14/2008    AIME-EN-Y PROCEDURE  03/31/2008    TOTAL SHOULDER REPLACEMENT  06/24/2014    UPPER GASTROINTESTINAL ENDOSCOPY       Social History   Social History     Substance and Sexual Activity   Alcohol Use Not Currently     Social History     Substance and Sexual Activity   Drug Use Yes    Types: Marijuana    Comment: Medical marijuana 03/01/2021     Social History     Tobacco Use   Smoking Status Former   Smokeless Tobacco Never     Family History   Problem Relation Age of Onset    Diabetes Mother     Hypertension Mother     Stroke Mother     Stroke Father     Leukemia Father     Thyroid disease Sister     Stroke Brother     Heart disease Brother        Meds/Allergies       Current Outpatient Medications:     acetaminophen (TYLENOL) 500 mg tablet    allopurinol (ZYLOPRIM) 100 mg tablet    ascorbic acid (VITAMIN C) 250 MG tablet    B Complex-Biotin-FA (B Complete) TABS    bepotastine besilate (BEPREVE) 1.5 % op soln    Biotin 1000 MCG CHEW    Brukinsa 80 MG CAPS    Calcium Carb-Ergocalciferol 250-125 MG-UNIT TABS    Cholecalciferol 100 MCG (4000 UT) CAPS    cyclobenzaprine (FLEXERIL) 10 mg tablet    docusate sodium (COLACE) 100 mg capsule    Ferrous Sulfate Dried (FERROUS SULFATE CR PO)    fexofenadine (Allegra Allergy) 60 MG tablet    gabapentin  "(NEURONTIN) 300 mg capsule    Lactobacillus (ACIDOPHOLUS PO)    levothyroxine 50 mcg tablet    linaCLOtide 145 MCG CAPS    lisinopril-hydrochlorothiazide (PRINZIDE,ZESTORETIC) 10-12.5 MG per tablet    melatonin 1 mg    Multiple Vitamins-Minerals (ONE DAILY WOMENS 50 PLUS PO)    Omega 3-6-9 Fatty Acids (OMEGA 3-6-9 COMPLEX) CAPS    OXYCODONE HCL PO    Polyethylene Glycol 3350 (MIRALAX PO)    Potassium (POTASSIMIN) 75 MG TABS    sucralfate (CARAFATE) 1 g tablet    vitamin E (TOCOPHEROL) capsule    colestipol (COLESTID) 1 g tablet    Allergies   Allergen Reactions    Avelox [Moxifloxacin] Other (See Comments)     shock    Cefaclor Other (See Comments)    Ciprofloxacin     Nsaids Other (See Comments)     NSAIDs are contraindicated due to history of gastric bypass.    Other Reaction(s): Other (See Comments)      NSAIDs are contraindicated due to history of gastric bypass.    Sulfa Antibiotics GI Intolerance    Sulfamethoxazole-Trimethoprim Other (See Comments)    Talwin [Pentazocine] Dizziness    Vicodin [Hydrocodone-Acetaminophen] GI Intolerance    Ondansetron Rash           Objective     Blood pressure 119/83, pulse 83, height 5' 5\" (1.651 m), weight 83 kg (183 lb). Body mass index is 30.45 kg/m².      PHYSICAL EXAM:      General Appearance:   Alert, cooperative, no distress   HEENT:   Normocephalic, atraumatic, anicteric.     Neck:  Supple, symmetrical, trachea midline   Lungs:   Clear to auscultation bilaterally; no rales, rhonchi or wheezing; respirations unlabored    Heart::   Regular rate and rhythm; no murmur, rub, or gallop.   Abdomen:   Soft, non-tender, non-distended; normal bowel sounds; no masses, no organomegaly    Genitalia:   Deferred    Rectal:   Deferred    Extremities:  No cyanosis, clubbing or edema    Pulses:  2+ and symmetric    Skin:  No jaundice, rashes, or lesions    Lymph nodes:  No palpable cervical lymphadenopathy        Lab Results:   No visits with results within 1 Day(s) from this visit. " "  Latest known visit with results is:   Hospital Outpatient Visit on 04/29/2024   Component Date Value    Case Report 04/29/2024                      Value:Surgical Pathology Report                         Case: [id number]                                  Authorizing Provider:  Valeriano Rich MD         Collected:           04/29/2024 1007              Ordering Location:     [facility]   Received:            04/29/2024 1327                                     Center                                                                       Pathologist:           Irvin Daigle MD                                                           Specimens:   A) - Esophagus, Bxs distal esophagus- check for candida                                             B) - Stomach, Bxs gastric pouch- check for H-Pylori                                                 C) - Duodenum, Bxs jejunum- check for celiac's                                             Final Diagnosis 04/29/2024                      Value:A. Esophagus, \"eiopsies distal esophagus-check for Candida,\" Biopsy:                          - Benign squamous mucosa with reactive changes and focal acute                           inflammation                          - Negative for intestinal metaplasia                          - Eosinophils are fewer than 3 cells per high power field in squamous                           epithelium, negative for eosinophilic esophagitis                          - No epithelial dysplasia and no evidence of malignancy                          - GMS histochemistry negative for fungal elements                                                    B. Stomach, \"Biopsies gastric pouch-check for H. pylori,\" Biopsy:                          - Antral mucosa with mild chronic inactive gastritis                          - Negative for intestinal metaplasia                          - Negative for curvilinear Helicobacter microorganisms on H&E        " "                                            C. Small intestine, \"Biopsies jejunum-check for celiac's,\" Biopsy:                          - Fragments of small intestinal mucosa with intact villous architecture                           and no increase in intraepithelial lymphocytes                          - Negative for chronic or active duodenitis, dysplasia or malignancy                                                        Additional Information 04/29/2024                      Value:All reported additional testing was performed with appropriately reactive                           controls.  These tests were developed and their performance                           characteristics determined by [facility] or                           appropriate performing facility, though some tests may be performed on                           tissues which have not been validated for performance characteristics                           (such as staining performed on alcohol exposed cell blocks and decalcified                           tissues).  Results should be interpreted with caution and in the context                           of the patients’ clinical condition. These tests may not be cleared or                           approved by the U.S. Food and Drug Administration, though the FDA has                           determined that such clearance or approval is not necessary. These tests                           are used for clinical purposes and they should not be regarded as                           investigational or for research. This laboratory has been approved by CLIA                           88, designated as a high-complexity laboratory and is qualified to perform                           these tests.                          .Interpretation performed at [facility]. [address]                           [id number]                              Gross Description 04/29/2024 " "                     Value:A. The specimen is received in formalin, labeled with the patient's name                           and hospital number, and is designated \" biopsies distal esophagus-check                           for Candida\".  The specimen consists of 2 colorless-red, rubbery, friable,                           translucent soft tissue fragments ranging in greatest dimension from 0.3                           to 0.5 cm.  Entirely submitted. Screened cassette.                          B. The specimen is received in formalin, labeled with the patient's name                           and hospital number, and is designated \" biopsies gastric pouch-check for                           H. pylori\".  The specimen consists of 2 tan-red, rubbery, friable soft                           tissue fragments ranging in greatest dimension from 0.5 to 0.8 cm.                            Entirely submitted. Screened cassette.                          C. The specimen is received in formalin, labeled with the patient's name                           and hospital number, and is designated \" biopsies jejunum-check for                           celiac's\".  The specimen consists of multiple tan-red, rubbery, friable                           soft tissue fragments in aggregate measuring 1.0 x 0.5 x 0.2 cm.  Entirely                           submitted. Screened cassette.                                                    Note: The estimated total formalin fixation time based upon information                           provided by the submitting clinician and the standard processing schedule                           is under 72 hours.                                                    [facility]    Clinical Information 04/29/2024                      Value:FINDINGS:  · Abnormal mucosa with exudate in the esophagus; performed cold forceps biopsy. Few scattered spots of adherent whitish exudate suggestive of candidiasis  · Z-line 38 cm " from the incisors  · Previous Gia-en-Y gastric bypass in the stomach; performed cold forceps biopsy. No evidence of marginal ulcer, suture line disruption, gastro gastric fistula or anastomotic stricture  · The jejunum appeared normal. Performed random biopsy using biopsy forceps.           Radiology Results:   No results found.

## 2024-07-23 ENCOUNTER — OFFICE VISIT (OUTPATIENT)
Age: 69
End: 2024-07-23
Payer: COMMERCIAL

## 2024-07-23 VITALS
HEIGHT: 65 IN | BODY MASS INDEX: 30.16 KG/M2 | HEART RATE: 84 BPM | WEIGHT: 181 LBS | DIASTOLIC BLOOD PRESSURE: 80 MMHG | RESPIRATION RATE: 16 BRPM | SYSTOLIC BLOOD PRESSURE: 127 MMHG

## 2024-07-23 DIAGNOSIS — M25.572 PAIN IN JOINTS OF BOTH FEET: ICD-10-CM

## 2024-07-23 DIAGNOSIS — M25.571 PAIN IN JOINTS OF BOTH FEET: ICD-10-CM

## 2024-07-23 DIAGNOSIS — M77.42 METATARSALGIA OF BOTH FEET: ICD-10-CM

## 2024-07-23 DIAGNOSIS — M21.962 ACQUIRED DEFORMITY OF BOTH FEET: Primary | ICD-10-CM

## 2024-07-23 DIAGNOSIS — M77.41 METATARSALGIA OF BOTH FEET: ICD-10-CM

## 2024-07-23 DIAGNOSIS — M20.42 HAMMER TOES OF BOTH FEET: ICD-10-CM

## 2024-07-23 DIAGNOSIS — M21.961 ACQUIRED DEFORMITY OF BOTH FEET: Primary | ICD-10-CM

## 2024-07-23 DIAGNOSIS — L84 CORNS: ICD-10-CM

## 2024-07-23 DIAGNOSIS — M20.41 HAMMER TOES OF BOTH FEET: ICD-10-CM

## 2024-07-23 PROCEDURE — 99203 OFFICE O/P NEW LOW 30 MIN: CPT | Performed by: PODIATRIST

## 2024-07-23 RX ORDER — UREA 200 MG/G
CREAM TOPICAL AS NEEDED
Qty: 480 G | Refills: 2 | Status: SHIPPED | OUTPATIENT
Start: 2024-07-23 | End: 2024-09-21

## 2024-07-23 NOTE — PROGRESS NOTES
Assessment/Plan: Pain upon ambulation secondary to acquired deformity foot.  Arthralgia.  Pes planus.  Osteoarthritis.  Callus formation.    Plan.  Chart reviewed.  PCP notes reviewed.  Prior surgical notes reviewed.  Patient evaluated.  At this time we will start topical care for callus formation.  Patient advised on shoe gear style and fitting.  Patient may need hammertoe surgery.  Return for follow-up.         Diagnoses and all orders for this visit:    Acquired deformity of both feet    Pain in joints of both feet    Metatarsalgia of both feet    Hammer toes of both feet    Corns  -     urea (CARMOL) 20 % cream; Apply topically as needed for dry skin          Subjective: Patient presents for second opinion.  She has pain in her feet with ambulation.  She has deviation of toes of feet.  She has pain.  She has multiple foot surgeries by history.    Allergies   Allergen Reactions    Avelox [Moxifloxacin] Other (See Comments)     shock    Cefaclor Other (See Comments)    Ciprofloxacin     Nsaids Other (See Comments)     NSAIDs are contraindicated due to history of gastric bypass.    Other Reaction(s): Other (See Comments)      NSAIDs are contraindicated due to history of gastric bypass.    Sulfa Antibiotics GI Intolerance    Sulfamethoxazole-Trimethoprim Other (See Comments)    Talwin [Pentazocine] Dizziness    Vicodin [Hydrocodone-Acetaminophen] GI Intolerance    Ondansetron Rash         Current Outpatient Medications:     urea (CARMOL) 20 % cream, Apply topically as needed for dry skin, Disp: 480 g, Rfl: 2    acetaminophen (TYLENOL) 500 mg tablet, Take 1 tablet (500 mg total) by mouth every 6 (six) hours as needed for mild pain, Disp: , Rfl: 0    allopurinol (ZYLOPRIM) 100 mg tablet, Take 100 mg by mouth daily, Disp: , Rfl:     ascorbic acid (VITAMIN C) 250 MG tablet, Take 100 mg by mouth daily , Disp: , Rfl:     B Complex-Biotin-FA (B Complete) TABS, Take by mouth, Disp: , Rfl:     bepotastine besilate (BEPREVE)  1.5 % op soln, Apply to eye daily, Disp: , Rfl:     Biotin 1000 MCG CHEW, Chew, Disp: , Rfl:     Brukinsa 80 MG CAPS, , Disp: , Rfl:     Calcium Carb-Ergocalciferol 250-125 MG-UNIT TABS, calcium carb-ergocalciferol (vit D2) 250 mg (625 mg)-125 unit tablet  1 tablespoon by oral route., Disp: , Rfl:     Cholecalciferol 100 MCG (4000 UT) CAPS, Take 5,000 Units by mouth 2 (two) times a day with meals 5,000 units BID, Disp: , Rfl:     colestipol (COLESTID) 1 g tablet, Take 1 tablet (1 g total) by mouth 4 (four) times a day (Patient not taking: Reported on 7/10/2024), Disp: 120 tablet, Rfl: 5    cyclobenzaprine (FLEXERIL) 10 mg tablet, 3 (three) times a day, Disp: , Rfl:     docusate sodium (COLACE) 100 mg capsule, Take 100 mg by mouth every other day, Disp: , Rfl:     Ferrous Sulfate Dried (FERROUS SULFATE CR PO), Take 65 mg by mouth every other day, Disp: , Rfl:     fexofenadine (Allegra Allergy) 60 MG tablet, Take 60 mg by mouth daily, Disp: , Rfl:     gabapentin (NEURONTIN) 300 mg capsule, take 2 capsules by mouth three times a day, Disp: , Rfl:     Lactobacillus (ACIDOPHOLUS PO), Take by mouth, Disp: , Rfl:     levothyroxine 50 mcg tablet, Take 50 mcg by mouth daily, Disp: , Rfl:     linaCLOtide 145 MCG CAPS, Take 1 capsule (145 mcg total) by mouth daily, Disp: 90 capsule, Rfl: 2    lisinopril-hydrochlorothiazide (PRINZIDE,ZESTORETIC) 10-12.5 MG per tablet, lisinopril 10 mg-hydrochlorothiazide 12.5 mg tablet, Disp: , Rfl:     melatonin 1 mg, Take 5 mg by mouth daily at bedtime, Disp: , Rfl:     Multiple Vitamins-Minerals (ONE DAILY WOMENS 50 PLUS PO), One Daily Womens 50 Plus, Disp: , Rfl:     Omega 3-6-9 Fatty Acids (OMEGA 3-6-9 COMPLEX) CAPS, omega 3,6,9 combination no.7 92 mg (43 mg-22 fm-13kh-24hw) chew tablet, Disp: , Rfl:     OXYCODONE HCL PO, Take 10 mg by mouth 3 (three) times a day, Disp: , Rfl:     Polyethylene Glycol 3350 (MIRALAX PO), Take by mouth as needed, Disp: , Rfl:     Potassium (POTASSIMIN) 75 MG  TABS, Take by mouth, Disp: , Rfl:     sucralfate (CARAFATE) 1 g tablet, Take 1 tablet (1 g total) by mouth 3 (three) times a day before meals, Disp: 270 tablet, Rfl: 2    vitamin E (TOCOPHEROL) capsule, Take 400 Units by mouth daily, Disp: , Rfl:     Patient Active Problem List   Diagnosis    History of non-Hodgkin's lymphoma    Hypothyroid    Low back pain    Bariatric surgery status    Obesity, Class II, BMI 35-39.9    Postsurgical malabsorption    Gastroesophageal reflux disease    Atypical chest pain    Drug-induced constipation    Scoliosis of thoracolumbar spine    Intractable pain    Chronic pulmonary embolism (HCC)    Urinary retention    Essential (hemorrhagic) thrombocythemia (HCC)    Macrocytic anemia    Acute pulmonary embolism without acute cor pulmonale (HCC)    Status post right knee replacement    Essential hypertension    Right leg DVT (HCC)    Headache    Morbid (severe) obesity due to excess calories (HCC)    Left upper quadrant abdominal pain          Patient ID: Anisa Long is a 69 y.o. female.    HPI    The following portions of the patient's history were reviewed and updated as appropriate:     family history includes Diabetes in her mother; Heart disease in her brother; Hypertension in her mother; Leukemia in her father; Stroke in her brother, father, and mother; Thyroid disease in her sister.      reports that she has quit smoking. She has never used smokeless tobacco. She reports that she does not currently use alcohol. She reports current drug use. Drug: Marijuana.    Vitals:    07/23/24 1004   BP: 127/80   Pulse: 84   Resp: 16       Review of Systems      Objective:  Patient's shoes and socks removed.   Foot Exam    General  General Appearance: appears stated age and healthy   Orientation: alert and oriented to person, place, and time   Affect: appropriate   Gait: antalgic       Right Foot/Ankle     Inspection and Palpation  Swelling: dorsum   Arch: pes planus  Claw Toes:  second toe, fifth toe, fourth toe and third toe  Hallux valgus: yes    Neurovascular  Dorsalis pedis: 3+  Posterior tibial: 3+  Saphenous nerve sensation: diminished  Tibial nerve sensation: diminished  Superficial peroneal nerve sensation: diminished  Deep peroneal nerve sensation: diminished  Sural nerve sensation: diminished      Left Foot/Ankle      Inspection and Palpation  Swelling: dorsum   Arch: pes planus  Claw toes: second toe, fifth toe, fourth toe and third toe  Hallux valgus: yes    Neurovascular  Dorsalis pedis: 3+  Posterior tibial: 3+  Saphenous nerve sensation: diminished  Tibial nerve sensation: diminished  Superficial peroneal nerve sensation: diminished  Deep peroneal nerve sensation: diminished  Sural nerve sensation: diminished      Physical Exam  Vitals and nursing note reviewed.   Cardiovascular:      Rate and Rhythm: Normal rate and regular rhythm.      Pulses:           Dorsalis pedis pulses are 3+ on the right side and 3+ on the left side.        Posterior tibial pulses are 3+ on the right side and 3+ on the left side.   Musculoskeletal:      Right foot: Bunion present.      Left foot: Bunion present.   Feet:      Comments: All toes are clawed.  Fourth toe demonstrates distal clavi.  This is of the right foot.  Skin:     Capillary Refill: Capillary refill takes less than 2 seconds.      Comments: Patient demonstrates callus submetatarsal 1 bilateral.  She also has clavi distal aspect fourth right toe.   Psychiatric:         Mood and Affect: Mood normal.         Behavior: Behavior normal.         Thought Content: Thought content normal.         Judgment: Judgment normal.

## 2024-09-24 ENCOUNTER — OFFICE VISIT (OUTPATIENT)
Age: 69
End: 2024-09-24
Payer: COMMERCIAL

## 2024-09-24 VITALS
WEIGHT: 181 LBS | SYSTOLIC BLOOD PRESSURE: 110 MMHG | DIASTOLIC BLOOD PRESSURE: 73 MMHG | RESPIRATION RATE: 17 BRPM | BODY MASS INDEX: 30.16 KG/M2 | HEIGHT: 65 IN | HEART RATE: 87 BPM

## 2024-09-24 DIAGNOSIS — M21.961 ACQUIRED DEFORMITY OF BOTH FEET: Primary | ICD-10-CM

## 2024-09-24 DIAGNOSIS — M20.41 HAMMER TOES OF BOTH FEET: ICD-10-CM

## 2024-09-24 DIAGNOSIS — M77.41 METATARSALGIA OF BOTH FEET: ICD-10-CM

## 2024-09-24 DIAGNOSIS — M25.572 PAIN IN JOINTS OF BOTH FEET: ICD-10-CM

## 2024-09-24 DIAGNOSIS — M20.42 HAMMER TOES OF BOTH FEET: ICD-10-CM

## 2024-09-24 DIAGNOSIS — M77.42 METATARSALGIA OF BOTH FEET: ICD-10-CM

## 2024-09-24 DIAGNOSIS — M25.571 PAIN IN JOINTS OF BOTH FEET: ICD-10-CM

## 2024-09-24 DIAGNOSIS — M21.962 ACQUIRED DEFORMITY OF BOTH FEET: Primary | ICD-10-CM

## 2024-09-24 PROCEDURE — 99213 OFFICE O/P EST LOW 20 MIN: CPT | Performed by: PODIATRIST

## 2024-09-24 NOTE — PROGRESS NOTES
Assessment/Plan: Pain upon ambulation secondary to acquired deformity foot.  Arthralgia.  Pes planus.  Osteoarthritis.  Callus formation.     Plan.  Chart reviewed.  PCP notes reviewed.  Prior surgical notes reviewed.  Patient evaluated.  At this time we will start topical care for callus formation.  Patient advised on shoe gear style and fitting.  Patient may need hammertoe surgery.  Return for follow-up.    Patient understands that in order to address digital deformity she would need pinning.  This to be 4 to 6 weeks.  She will consider after she is off chemotherapy.              Assessment  Diagnoses and all orders for this visit:     Acquired deformity of both feet     Pain in joints of both feet     Metatarsalgia of both feet     Hammer toes of both feet     Corns  -     urea (CARMOL) 20 % cream; Apply topically as needed for dry skin              Subjective: Patient presents for second opinion.  She has pain in her feet with ambulation.  She has deviation of toes of feet.  She has pain.  She has multiple foot surgeries by history.           Allergies   Allergen Reactions    Avelox [Moxifloxacin] Other (See Comments)       shock    Cefaclor Other (See Comments)    Ciprofloxacin      Nsaids Other (See Comments)       NSAIDs are contraindicated due to history of gastric bypass.     Other Reaction(s): Other (See Comments)      NSAIDs are contraindicated due to history of gastric bypass.    Sulfa Antibiotics GI Intolerance    Sulfamethoxazole-Trimethoprim Other (See Comments)    Talwin [Pentazocine] Dizziness    Vicodin [Hydrocodone-Acetaminophen] GI Intolerance    Ondansetron Rash            Current Outpatient Medications:     urea (CARMOL) 20 % cream, Apply topically as needed for dry skin, Disp: 480 g, Rfl: 2    acetaminophen (TYLENOL) 500 mg tablet, Take 1 tablet (500 mg total) by mouth every 6 (six) hours as needed for mild pain, Disp: , Rfl: 0    allopurinol (ZYLOPRIM) 100 mg tablet, Take 100 mg by mouth  daily, Disp: , Rfl:     ascorbic acid (VITAMIN C) 250 MG tablet, Take 100 mg by mouth daily , Disp: , Rfl:     B Complex-Biotin-FA (B Complete) TABS, Take by mouth, Disp: , Rfl:     bepotastine besilate (BEPREVE) 1.5 % op soln, Apply to eye daily, Disp: , Rfl:     Biotin 1000 MCG CHEW, Chew, Disp: , Rfl:     Brukinsa 80 MG CAPS, , Disp: , Rfl:     Calcium Carb-Ergocalciferol 250-125 MG-UNIT TABS, calcium carb-ergocalciferol (vit D2) 250 mg (625 mg)-125 unit tablet  1 tablespoon by oral route., Disp: , Rfl:     Cholecalciferol 100 MCG (4000 UT) CAPS, Take 5,000 Units by mouth 2 (two) times a day with meals 5,000 units BID, Disp: , Rfl:     colestipol (COLESTID) 1 g tablet, Take 1 tablet (1 g total) by mouth 4 (four) times a day (Patient not taking: Reported on 7/10/2024), Disp: 120 tablet, Rfl: 5    cyclobenzaprine (FLEXERIL) 10 mg tablet, 3 (three) times a day, Disp: , Rfl:     docusate sodium (COLACE) 100 mg capsule, Take 100 mg by mouth every other day, Disp: , Rfl:     Ferrous Sulfate Dried (FERROUS SULFATE CR PO), Take 65 mg by mouth every other day, Disp: , Rfl:     fexofenadine (Allegra Allergy) 60 MG tablet, Take 60 mg by mouth daily, Disp: , Rfl:     gabapentin (NEURONTIN) 300 mg capsule, take 2 capsules by mouth three times a day, Disp: , Rfl:     Lactobacillus (ACIDOPHOLUS PO), Take by mouth, Disp: , Rfl:     levothyroxine 50 mcg tablet, Take 50 mcg by mouth daily, Disp: , Rfl:     linaCLOtide 145 MCG CAPS, Take 1 capsule (145 mcg total) by mouth daily, Disp: 90 capsule, Rfl: 2    lisinopril-hydrochlorothiazide (PRINZIDE,ZESTORETIC) 10-12.5 MG per tablet, lisinopril 10 mg-hydrochlorothiazide 12.5 mg tablet, Disp: , Rfl:     melatonin 1 mg, Take 5 mg by mouth daily at bedtime, Disp: , Rfl:     Multiple Vitamins-Minerals (ONE DAILY WOMENS 50 PLUS PO), One Daily Womens 50 Plus, Disp: , Rfl:     Omega 3-6-9 Fatty Acids (OMEGA 3-6-9 COMPLEX) CAPS, omega 3,6,9 combination no.7 92 mg (43 mg-22 km-15ep-50gm) chew  tablet, Disp: , Rfl:     OXYCODONE HCL PO, Take 10 mg by mouth 3 (three) times a day, Disp: , Rfl:     Polyethylene Glycol 3350 (MIRALAX PO), Take by mouth as needed, Disp: , Rfl:     Potassium (POTASSIMIN) 75 MG TABS, Take by mouth, Disp: , Rfl:     sucralfate (CARAFATE) 1 g tablet, Take 1 tablet (1 g total) by mouth 3 (three) times a day before meals, Disp: 270 tablet, Rfl: 2    vitamin E (TOCOPHEROL) capsule, Take 400 Units by mouth daily, Disp: , Rfl:      Problem List       Patient Active Problem List   Diagnosis    History of non-Hodgkin's lymphoma    Hypothyroid    Low back pain    Bariatric surgery status    Obesity, Class II, BMI 35-39.9    Postsurgical malabsorption    Gastroesophageal reflux disease    Atypical chest pain    Drug-induced constipation    Scoliosis of thoracolumbar spine    Intractable pain    Chronic pulmonary embolism (HCC)    Urinary retention    Essential (hemorrhagic) thrombocythemia (HCC)    Macrocytic anemia    Acute pulmonary embolism without acute cor pulmonale (HCC)    Status post right knee replacement    Essential hypertension    Right leg DVT (HCC)    Headache    Morbid (severe) obesity due to excess calories (HCC)    Left upper quadrant abdominal pain                  Subjective  Patient ID: Anisa Long is a 69 y.o. female.     HPI     The following portions of the patient's history were reviewed and updated as appropriate:      family history includes Diabetes in her mother; Heart disease in her brother; Hypertension in her mother; Leukemia in her father; Stroke in her brother, father, and mother; Thyroid disease in her sister.       reports that she has quit smoking. She has never used smokeless tobacco. She reports that she does not currently use alcohol. She reports current drug use. Drug: Marijuana.           Objective:  Patient's shoes and socks removed.        General  General Appearance: appears stated age and healthy   Orientation: alert and oriented  to person, place, and time   Affect: appropriate   Gait: antalgic         Right Foot/Ankle      Inspection and Palpation  Swelling: dorsum   Arch: pes planus  Claw Toes: second toe, fifth toe, fourth toe and third toe  Hallux valgus: yes     Neurovascular  Dorsalis pedis: 3+  Posterior tibial: 3+  Saphenous nerve sensation: diminished  Tibial nerve sensation: diminished  Superficial peroneal nerve sensation: diminished  Deep peroneal nerve sensation: diminished  Sural nerve sensation: diminished        Left Foot/Ankle       Inspection and Palpation  Swelling: dorsum   Arch: pes planus  Claw toes: second toe, fifth toe, fourth toe and third toe  Hallux valgus: yes     Neurovascular  Dorsalis pedis: 3+  Posterior tibial: 3+  Saphenous nerve sensation: diminished  Tibial nerve sensation: diminished  Superficial peroneal nerve sensation: diminished  Deep peroneal nerve sensation: diminished  Sural nerve sensation: diminished        Physical Exam  Vitals and nursing note reviewed.   Cardiovascular:      Rate and Rhythm: Normal rate and regular rhythm.      Pulses:           Dorsalis pedis pulses are 3+ on the right side and 3+ on the left side.        Posterior tibial pulses are 3+ on the right side and 3+ on the left side.   Musculoskeletal:      Right foot: Bunion present.      Left foot: Bunion present.   Feet:      Comments: All toes are clawed.  Fourth toe demonstrates distal clavi.  This is of the right foot.  Skin:     Capillary Refill: Capillary refill takes less than 2 seconds.      Comments: Patient demonstrates callus submetatarsal 1 bilateral.  She also has clavi distal aspect fourth right toe.   Psychiatric:         Mood and Affect: Mood normal.         Behavior: Behavior normal.         Thought Content: Thought content normal.         Judgment: Judgment normal.

## 2025-03-26 ENCOUNTER — OFFICE VISIT (OUTPATIENT)
Age: 70
End: 2025-03-26
Payer: COMMERCIAL

## 2025-03-26 VITALS
DIASTOLIC BLOOD PRESSURE: 57 MMHG | SYSTOLIC BLOOD PRESSURE: 90 MMHG | WEIGHT: 189.6 LBS | HEIGHT: 65 IN | HEART RATE: 109 BPM | BODY MASS INDEX: 31.59 KG/M2

## 2025-03-26 DIAGNOSIS — C88.00 MACROGLOBULINEMIA: ICD-10-CM

## 2025-03-26 DIAGNOSIS — Z98.84 BARIATRIC SURGERY STATUS: ICD-10-CM

## 2025-03-26 DIAGNOSIS — R19.7 DIARRHEA, UNSPECIFIED TYPE: Primary | ICD-10-CM

## 2025-03-26 DIAGNOSIS — K21.9 GASTROESOPHAGEAL REFLUX DISEASE, UNSPECIFIED WHETHER ESOPHAGITIS PRESENT: ICD-10-CM

## 2025-03-26 PROCEDURE — 99214 OFFICE O/P EST MOD 30 MIN: CPT | Performed by: INTERNAL MEDICINE

## 2025-03-26 RX ORDER — HYDROCHLOROTHIAZIDE 12.5 MG/1
12.5 TABLET ORAL EVERY EVENING
Status: ON HOLD | COMMUNITY

## 2025-03-26 RX ORDER — LOPERAMIDE HYDROCHLORIDE 2 MG/1
2 TABLET ORAL 4 TIMES DAILY PRN
Status: ON HOLD | COMMUNITY

## 2025-03-26 NOTE — PROGRESS NOTES
Name: Anisa Long      : 1955      MRN: [id number]  Encounter Provider: Juan Jose Jennings MD  Encounter Date: 3/26/2025   Encounter department: [facility]  :  Assessment & Plan  Diarrhea, unspecified type  History of diarrhea for 3 weeks, clinically no evidence of acute abdomen is obstruction.  Has been on treatment by Dr. Ramos for macroglobulinemia.  Colonoscopy 3 to 4 years ago was generally unremarkable.  Given her compromised immune system history, any infectious etiology possible , could be related to withdrawal from narcotic as well.  Clinically no evidence of acute abdomen is obstruction.  Multiple stool studies like C. difficile culture and ova parasites have been ordered already, will await the results.  In the meanwhile bland diet Imodium for symptomatic relief avoid fatty fried greasy foods, if symptoms persist call for further evaluation.       Gastroesophageal reflux disease, unspecified whether esophagitis present         Bariatric surgery status         Macroglobulinemia             History of Present Illness   HPI  Anisa Long is a 70 y.o. female who presents history of macroglobulinemia, has been on treatment by Dr. Ramos, has been having diarrhea for the last 3 weeks, multiple times a day with some yellowish stool urgency no apparent blood, and sometimes wakes up during sleep, denies any nausea vomiting fever chills rash chest pain shortness of breath, no new medications or antibiotics, she did have some infection on the hand, was given IV antibiotics which were started after she already had the diarrhea.  She has been on treatment with Brukinsa pills for her condition.  She also trying to get off Percocet, stopped 3 to 4 weeks ago.  Diarrhea started after that.  Diet medications some of the current and prior records reviewed.  More than 10 systems reviewed.      Review of Systems   All other systems reviewed and are  "negative.         Objective   BP 90/57 (BP Location: Left arm, Patient Position: Sitting, Cuff Size: Standard)   Pulse (!) 109   Ht 5' 5\" (1.651 m)   Wt 86 kg (189 lb 9.6 oz)   BMI 31.55 kg/m²      Physical Exam  Constitutional:       General: She is not in acute distress.     Appearance: She is obese. She is not ill-appearing.   HENT:      Mouth/Throat:      Mouth: Mucous membranes are moist.   Cardiovascular:      Rate and Rhythm: Normal rate.   Pulmonary:      Effort: No respiratory distress.      Breath sounds: No stridor. No wheezing or rales.   Abdominal:      General: Bowel sounds are normal. There is no distension.      Palpations: There is no mass.      Tenderness: There is no abdominal tenderness. There is no guarding or rebound.      Hernia: No hernia is present.   Skin:     Coloration: Skin is not jaundiced or pale.   Neurological:      Mental Status: She is oriented to person, place, and time.           "

## 2025-03-28 ENCOUNTER — HOSPITAL ENCOUNTER (INPATIENT)
Facility: HOSPITAL | Age: 70
LOS: 4 days | Discharge: HOME | DRG: 871 | End: 2025-04-01
Attending: EMERGENCY MEDICINE | Admitting: INTERNAL MEDICINE
Payer: COMMERCIAL

## 2025-03-28 ENCOUNTER — APPOINTMENT (EMERGENCY)
Dept: RADIOLOGY | Facility: HOSPITAL | Age: 70
DRG: 871 | End: 2025-03-28
Payer: COMMERCIAL

## 2025-03-28 ENCOUNTER — APPOINTMENT (EMERGENCY)
Dept: CT IMAGING | Facility: HOSPITAL | Age: 70
DRG: 871 | End: 2025-03-28
Payer: COMMERCIAL

## 2025-03-28 DIAGNOSIS — A41.9 SEPSIS (HCC): Primary | ICD-10-CM

## 2025-03-28 DIAGNOSIS — K52.9 COLITIS: ICD-10-CM

## 2025-03-28 DIAGNOSIS — I95.9 HYPOTENSION: ICD-10-CM

## 2025-03-28 DIAGNOSIS — N17.9 AKI (ACUTE KIDNEY INJURY) (HCC): ICD-10-CM

## 2025-03-28 PROBLEM — R57.9 SHOCK (HCC): Status: ACTIVE | Noted: 2025-03-28

## 2025-03-28 PROBLEM — D64.9 ANEMIA: Status: ACTIVE | Noted: 2020-11-13

## 2025-03-28 PROBLEM — J96.01 ACUTE RESPIRATORY FAILURE WITH HYPOXIA (HCC): Status: ACTIVE | Noted: 2025-03-28

## 2025-03-28 PROBLEM — R65.21 SEPTIC SHOCK (HCC): Status: ACTIVE | Noted: 2025-03-28

## 2025-03-28 PROBLEM — Z86.19 HISTORY OF CLOSTRIDIUM DIFFICILE INFECTION: Status: ACTIVE | Noted: 2025-03-28

## 2025-03-28 LAB
ALBUMIN SERPL BCG-MCNC: 3.2 G/DL (ref 3.5–5)
ALP SERPL-CCNC: 53 U/L (ref 34–104)
ALT SERPL W P-5'-P-CCNC: 10 U/L (ref 7–52)
ANION GAP SERPL CALCULATED.3IONS-SCNC: 8 MMOL/L (ref 4–13)
ANISOCYTOSIS BLD QL SMEAR: PRESENT
APTT PPP: 33 SECONDS (ref 23–34)
AST SERPL W P-5'-P-CCNC: 16 U/L (ref 13–39)
ATRIAL RATE: 112 BPM
BACTERIA UR QL AUTO: NORMAL /HPF
BASOPHILS # BLD MANUAL: 0 THOUSAND/UL (ref 0–0.1)
BASOPHILS NFR MAR MANUAL: 0 % (ref 0–1)
BILIRUB SERPL-MCNC: 0.85 MG/DL (ref 0.2–1)
BILIRUB UR QL STRIP: NEGATIVE
BUN SERPL-MCNC: 26 MG/DL (ref 5–25)
CALCIUM ALBUM COR SERPL-MCNC: 9.3 MG/DL (ref 8.3–10.1)
CALCIUM SERPL-MCNC: 8.7 MG/DL (ref 8.4–10.2)
CHLORIDE SERPL-SCNC: 99 MMOL/L (ref 96–108)
CLARITY UR: CLEAR
CO2 SERPL-SCNC: 26 MMOL/L (ref 21–32)
COLOR UR: YELLOW
CREAT SERPL-MCNC: 1.69 MG/DL (ref 0.6–1.3)
EOSINOPHIL # BLD MANUAL: 0.05 THOUSAND/UL (ref 0–0.4)
EOSINOPHIL NFR BLD MANUAL: 1 % (ref 0–6)
ERYTHROCYTE [DISTWIDTH] IN BLOOD BY AUTOMATED COUNT: 13.2 % (ref 11.6–15.1)
FLUAV AG UPPER RESP QL IA.RAPID: NEGATIVE
FLUBV AG UPPER RESP QL IA.RAPID: NEGATIVE
GFR SERPL CREATININE-BSD FRML MDRD: 30 ML/MIN/1.73SQ M
GLUCOSE SERPL-MCNC: 95 MG/DL (ref 65–140)
GLUCOSE UR STRIP-MCNC: NEGATIVE MG/DL
HCT VFR BLD AUTO: 29.9 % (ref 34.8–46.1)
HGB BLD-MCNC: 9.8 G/DL (ref 11.5–15.4)
HGB UR QL STRIP.AUTO: NEGATIVE
INR PPP: 1.02 (ref 0.85–1.19)
KETONES UR STRIP-MCNC: NEGATIVE MG/DL
LACTATE SERPL-SCNC: 0.8 MMOL/L (ref 0.5–2)
LEUKOCYTE ESTERASE UR QL STRIP: ABNORMAL
LYMPHOCYTES # BLD AUTO: 1.33 THOUSAND/UL (ref 0.6–4.47)
LYMPHOCYTES # BLD AUTO: 24 % (ref 14–44)
MCH RBC QN AUTO: 37.4 PG (ref 26.8–34.3)
MCHC RBC AUTO-ENTMCNC: 32.8 G/DL (ref 31.4–37.4)
MCV RBC AUTO: 114 FL (ref 82–98)
METAMYELOCYTE ABSOLUTE CT: 0.2 THOUSAND/UL (ref 0–0.1)
METAMYELOCYTES NFR BLD MANUAL: 4 % (ref 0–1)
MONOCYTES # BLD AUTO: 0.66 THOUSAND/UL (ref 0–1.22)
MONOCYTES NFR BLD: 13 % (ref 4–12)
MYELOCYTE ABSOLUTE CT: 0.15 THOUSAND/UL (ref 0–0.1)
MYELOCYTES NFR BLD MANUAL: 3 % (ref 0–1)
NEUTROPHILS # BLD MANUAL: 2.71 THOUSAND/UL (ref 1.85–7.62)
NEUTS BAND NFR BLD MANUAL: 10 % (ref 0–8)
NEUTS SEG NFR BLD AUTO: 43 % (ref 43–75)
NITRITE UR QL STRIP: NEGATIVE
NON-SQ EPI CELLS URNS QL MICRO: NORMAL /HPF
P AXIS: 63 DEGREES
PH UR STRIP.AUTO: 5.5 [PH]
PLATELET # BLD AUTO: 201 THOUSANDS/UL (ref 149–390)
PLATELET BLD QL SMEAR: ADEQUATE
PMV BLD AUTO: 9.8 FL (ref 8.9–12.7)
POTASSIUM SERPL-SCNC: 4.2 MMOL/L (ref 3.5–5.3)
PR INTERVAL: 120 MS
PROCALCITONIN SERPL-MCNC: 3.32 NG/ML
PROT SERPL-MCNC: 4.9 G/DL (ref 6.4–8.4)
PROT UR STRIP-MCNC: NEGATIVE MG/DL
PROTHROMBIN TIME: 14.1 SECONDS (ref 12.3–15)
QRS AXIS: 51 DEGREES
QRSD INTERVAL: 80 MS
QT INTERVAL: 330 MS
QTC INTERVAL: 450 MS
RBC # BLD AUTO: 2.62 MILLION/UL (ref 3.81–5.12)
RBC #/AREA URNS AUTO: NORMAL /HPF
RBC MORPH BLD: PRESENT
SARS-COV+SARS-COV-2 AG RESP QL IA.RAPID: NEGATIVE
SODIUM SERPL-SCNC: 133 MMOL/L (ref 135–147)
SP GR UR STRIP.AUTO: 1.01 (ref 1–1.03)
T WAVE AXIS: 73 DEGREES
UROBILINOGEN UR STRIP-ACNC: <2 MG/DL
VARIANT LYMPHS # BLD AUTO: 2 %
VENTRICULAR RATE: 112 BPM
WBC # BLD AUTO: 5.11 THOUSAND/UL (ref 4.31–10.16)
WBC #/AREA URNS AUTO: NORMAL /HPF

## 2025-03-28 PROCEDURE — 82607 VITAMIN B-12: CPT

## 2025-03-28 PROCEDURE — 83605 ASSAY OF LACTIC ACID: CPT

## 2025-03-28 PROCEDURE — 96361 HYDRATE IV INFUSION ADD-ON: CPT

## 2025-03-28 PROCEDURE — 87804 INFLUENZA ASSAY W/OPTIC: CPT | Performed by: EMERGENCY MEDICINE

## 2025-03-28 PROCEDURE — 84439 ASSAY OF FREE THYROXINE: CPT

## 2025-03-28 PROCEDURE — 96365 THER/PROPH/DIAG IV INF INIT: CPT

## 2025-03-28 PROCEDURE — 80053 COMPREHEN METABOLIC PANEL: CPT

## 2025-03-28 PROCEDURE — NC001 PR NO CHARGE: Performed by: INTERNAL MEDICINE

## 2025-03-28 PROCEDURE — 74177 CT ABD & PELVIS W/CONTRAST: CPT

## 2025-03-28 PROCEDURE — 81001 URINALYSIS AUTO W/SCOPE: CPT

## 2025-03-28 PROCEDURE — 93005 ELECTROCARDIOGRAM TRACING: CPT

## 2025-03-28 PROCEDURE — 87040 BLOOD CULTURE FOR BACTERIA: CPT

## 2025-03-28 PROCEDURE — 87811 SARS-COV-2 COVID19 W/OPTIC: CPT | Performed by: EMERGENCY MEDICINE

## 2025-03-28 PROCEDURE — 82746 ASSAY OF FOLIC ACID SERUM: CPT

## 2025-03-28 PROCEDURE — 84145 PROCALCITONIN (PCT): CPT

## 2025-03-28 PROCEDURE — 71045 X-RAY EXAM CHEST 1 VIEW: CPT

## 2025-03-28 PROCEDURE — 71260 CT THORAX DX C+: CPT

## 2025-03-28 PROCEDURE — 96374 THER/PROPH/DIAG INJ IV PUSH: CPT

## 2025-03-28 PROCEDURE — 36415 COLL VENOUS BLD VENIPUNCTURE: CPT

## 2025-03-28 PROCEDURE — 85610 PROTHROMBIN TIME: CPT

## 2025-03-28 PROCEDURE — 96366 THER/PROPH/DIAG IV INF ADDON: CPT

## 2025-03-28 PROCEDURE — 85007 BL SMEAR W/DIFF WBC COUNT: CPT

## 2025-03-28 PROCEDURE — 84443 ASSAY THYROID STIM HORMONE: CPT

## 2025-03-28 PROCEDURE — 96367 TX/PROPH/DG ADDL SEQ IV INF: CPT

## 2025-03-28 PROCEDURE — 93010 ELECTROCARDIOGRAM REPORT: CPT | Performed by: INTERNAL MEDICINE

## 2025-03-28 PROCEDURE — 85027 COMPLETE CBC AUTOMATED: CPT

## 2025-03-28 PROCEDURE — 85730 THROMBOPLASTIN TIME PARTIAL: CPT

## 2025-03-28 PROCEDURE — 99285 EMERGENCY DEPT VISIT HI MDM: CPT

## 2025-03-28 RX ORDER — VANCOMYCIN HYDROCHLORIDE 125 MG/1
125 CAPSULE ORAL EVERY 6 HOURS SCHEDULED
Status: DISCONTINUED | OUTPATIENT
Start: 2025-03-29 | End: 2025-04-01 | Stop reason: HOSPADM

## 2025-03-28 RX ORDER — CHLORHEXIDINE GLUCONATE ORAL RINSE 1.2 MG/ML
15 SOLUTION DENTAL EVERY 12 HOURS SCHEDULED
Status: DISCONTINUED | OUTPATIENT
Start: 2025-03-28 | End: 2025-04-01

## 2025-03-28 RX ORDER — OXYCODONE HYDROCHLORIDE 10 MG/1
10 TABLET ORAL 3 TIMES DAILY PRN
Status: DISCONTINUED | OUTPATIENT
Start: 2025-03-28 | End: 2025-04-01 | Stop reason: HOSPADM

## 2025-03-28 RX ORDER — HEPARIN SODIUM 5000 [USP'U]/ML
5000 INJECTION, SOLUTION INTRAVENOUS; SUBCUTANEOUS EVERY 8 HOURS SCHEDULED
Status: DISCONTINUED | OUTPATIENT
Start: 2025-03-28 | End: 2025-04-01 | Stop reason: HOSPADM

## 2025-03-28 RX ORDER — ACETAMINOPHEN 325 MG/1
975 TABLET ORAL EVERY 8 HOURS PRN
Status: DISCONTINUED | OUTPATIENT
Start: 2025-03-28 | End: 2025-04-01 | Stop reason: HOSPADM

## 2025-03-28 RX ORDER — LEVOTHYROXINE SODIUM 50 UG/1
50 TABLET ORAL
Status: DISCONTINUED | OUTPATIENT
Start: 2025-03-29 | End: 2025-04-01 | Stop reason: HOSPADM

## 2025-03-28 RX ORDER — SODIUM CHLORIDE 9 MG/ML
125 INJECTION, SOLUTION INTRAVENOUS CONTINUOUS
Status: DISCONTINUED | OUTPATIENT
Start: 2025-03-28 | End: 2025-03-28

## 2025-03-28 RX ORDER — GABAPENTIN 300 MG/1
600 CAPSULE ORAL ONCE
Status: COMPLETED | OUTPATIENT
Start: 2025-03-28 | End: 2025-03-28

## 2025-03-28 RX ORDER — METHYLPREDNISOLONE SODIUM SUCCINATE 125 MG/2ML
125 INJECTION, POWDER, LYOPHILIZED, FOR SOLUTION INTRAMUSCULAR; INTRAVENOUS ONCE
Status: COMPLETED | OUTPATIENT
Start: 2025-03-28 | End: 2025-03-28

## 2025-03-28 RX ORDER — SODIUM CHLORIDE, SODIUM GLUCONATE, SODIUM ACETATE, POTASSIUM CHLORIDE, MAGNESIUM CHLORIDE, SODIUM PHOSPHATE, DIBASIC, AND POTASSIUM PHOSPHATE .53; .5; .37; .037; .03; .012; .00082 G/100ML; G/100ML; G/100ML; G/100ML; G/100ML; G/100ML; G/100ML
125 INJECTION, SOLUTION INTRAVENOUS CONTINUOUS
Status: DISCONTINUED | OUTPATIENT
Start: 2025-03-28 | End: 2025-04-01

## 2025-03-28 RX ORDER — ACETAMINOPHEN 325 MG/1
975 TABLET ORAL ONCE
Status: COMPLETED | OUTPATIENT
Start: 2025-03-28 | End: 2025-03-28

## 2025-03-28 RX ADMIN — NOREPINEPHRINE BITARTRATE 5 MCG/MIN: 1 INJECTION INTRAVENOUS at 16:15

## 2025-03-28 RX ADMIN — METHYLPREDNISOLONE SODIUM SUCCINATE 125 MG: 125 INJECTION, POWDER, FOR SOLUTION INTRAMUSCULAR; INTRAVENOUS at 18:11

## 2025-03-28 RX ADMIN — SODIUM CHLORIDE 1000 ML: 0.9 INJECTION, SOLUTION INTRAVENOUS at 16:28

## 2025-03-28 RX ADMIN — VANCOMYCIN HYDROCHLORIDE 1750 MG: 5 INJECTION, POWDER, LYOPHILIZED, FOR SOLUTION INTRAVENOUS at 16:45

## 2025-03-28 RX ADMIN — SODIUM CHLORIDE 125 ML/HR: 0.9 INJECTION, SOLUTION INTRAVENOUS at 22:08

## 2025-03-28 RX ADMIN — CEFEPIME 2000 MG: 2 INJECTION, POWDER, FOR SOLUTION INTRAVENOUS at 16:12

## 2025-03-28 RX ADMIN — ACETAMINOPHEN 975 MG: 325 TABLET, FILM COATED ORAL at 16:29

## 2025-03-28 RX ADMIN — GABAPENTIN 600 MG: 300 CAPSULE ORAL at 16:55

## 2025-03-28 RX ADMIN — SODIUM CHLORIDE 1000 ML: 0.9 INJECTION, SOLUTION INTRAVENOUS at 17:30

## 2025-03-28 RX ADMIN — IOHEXOL 100 ML: 350 INJECTION, SOLUTION INTRAVENOUS at 19:40

## 2025-03-28 RX ADMIN — SODIUM CHLORIDE 1000 ML: 0.9 INJECTION, SOLUTION INTRAVENOUS at 15:47

## 2025-03-28 NOTE — ED ATTENDING ATTESTATION
3/28/2025  I, Katerina Kraus MD, saw and evaluated the patient. I have discussed the patient with the resident/non-physician practitioner and agree with the resident's/non-physician practitioner's findings, Plan of Care, and MDM as documented in the resident's/non-physician practitioner's note, except where noted. All available labs and Radiology studies were reviewed.  I was present for key portions of any procedure(s) performed by the resident/non-physician practitioner and I was immediately available to provide assistance.       At this point I agree with the current assessment done in the Emergency Department.  I have conducted an independent evaluation of this patient a history and physical is as follows:    Patient is a 70-year-old female presents to the emergency department with hypotension and fever.  She is on immunomodulators therapy for macroglobulinemia.  She is accompanied by her  who contributes to history.  He noted she had confusion in the primary care physician's office today which concerns him for possibility of UTI.  He notes that her blood pressure is typically on the lower side though yesterday was lower than usual and it was difficult to obtain venous access.  She declined coming in yesterday.    She initially offers no concerns and relates that she feels well.  On further questioning whether anything feels abnormal to her she relates that the genital region is quite uncomfortable.  This has been pruritic and she has noticed discharge.  She believes that she has both the yeast infection and urinary tract infection.  She has taken Monistat cream 2 days in a row.  She notes that she completed a 7-day course of antibiotics a week ago for infection in her finger.  The finger had been quite swollen  prompting this treatment.  Finger fully improved.  She does have history of prior yeast infections.  She denies dysuria.  She has been experiencing diarrhea for 3 to 4  weeks-preceding course of antibiotics.  (GI note regarding this reviewed.)    She does acknowledge cough occasionally productive of sputum.  No hemoptysis.  No chest pain or dyspnea.  No headache.  No areas of skin discomfort aside from that in the genitalia.    On exam she is alert with clear speech.  Mucous membranes slightly dry.  Heart sounds regular-rate increased.  Crackles at left base.  Good air movement elsewhere.  No appreciated wheezing.  Abdomen is soft and nontender.  Legs nontender and nonedematous.  External genitalia inspected.  No swelling, erythema or lesions noted.    Differential diagnosis includes but is not limited to acute infection (likely viral versus bacterial).  Strongly consider possibility of pneumonia, colitis, diverticulitis versus UTI.  Clinically dehydrated.  IV fluids and empiric antibiotics have been ordered.    Especially given her baseline immunosuppression anticipate admission for further treatment.        ED Course  ED Course as of 03/28/25 2251   Fri Mar 28, 2025   1923 Feeling well.  Recent blood pressures have been normal with systolics in the 110s.  She was able to just provide a urine specimen and this is being sent to the lab.  For CT scan shortly.   2030 UA notable only for trace leukocyte esterase.  No bacteria visualized to strongly suggest presence of UTI.   2209 Feeling well at this time.  Hungry.  Provided with food.  CT report just returned.  Evidence of inflammatory versus infectious colitis present.  Patient did have a bowel movement here although it was mixed in with urine and therefore could not be sent for testing.  Future specimen will be sent.  She has been covered with antibiotics.  White blood cell count is not markedly elevated suggestive of C. difficile.    She was weaned off of the norepinephrine although then had subsequent drops with maps in the 60s.  Asymptomatic.  Restarted on levo at 5 mcg/min.  Now weaning down to 2.5 and adding fluid bolus.    2224 I reached out to critical care team regarding patient presence and need for admission.  Unfortunately after weaning off of norepinephrine it needed to be restarted.  She now has maintenance fluids running as well there was several hours into her stay and I suspect may require some continued use of norepinephrine.   2230 Critical care team will be down to evaluate.   Accepted to critical care team.    Critical Care Time Statement: Upon my evaluation, this patient had a high probability of imminent or life-threatening deterioration due to hypotension and SIRS with concern for sepsis, which required my direct attention, intervention, and personal management.  I spent a total of 30 minutes directly providing critical care services, including interpretation of complex medical databases, evaluating for the presence of life-threatening injuries or illnesses, complex medical decision making (to support/prevent further life-threatening deterioration)., interpretation of hemodynamic data, titration of vasoactive medications, and discussion with additional team members . This time is exclusive of procedures, teaching, treating other patients, family meetings, and any prior time recorded by providers other than myself.        Critical Care Time  Procedures

## 2025-03-28 NOTE — ED PROVIDER NOTES
Time reflects when diagnosis was documented in both MDM as applicable and the Disposition within this note       Time User Action Codes Description Comment    3/28/2025  4:42 PM Agata Ricci Add [A41.9] Sepsis (HCC)     3/28/2025  4:42 PM Agata Ricci Add [N17.9] JN (acute kidney injury) (HCC)     3/28/2025 10:43 PM Katerina Kraus Add [K52.9] Colitis     3/28/2025 10:44 PM Katerina Kraus Add [I95.9] Hypotension           ED Disposition       ED Disposition   Admit    Condition   Stable    Date/Time   Fri Mar 28, 2025 10:43 PM    Comment   Case was discussed with JENNIFER Lopes and the patient's admission status was agreed to be Admission Status: inpatient status to the service of Dr. Vail .               Assessment & Plan       Medical Decision Making  Anisa is a 70-year-old female with a past medical history of bariatric surgery, GERD, yeast infection, essential thrombocytopenia, DVT, HTN, PE, hypothyroidism, Hodgkin's lymphoma, who presents emergency department due to hypotension and fever at primary care's office.    DDx includes but is not limited to: Pneumonia, UTI, diarrheal infection, neutropenia secondary to chemotherapy, JN,     See ED course for additional MDM. Patient was hypotensive during visit and needed NE for BP support. Patient reports that she tends to have low pressures at baseline, but was agreeable with the management. After fluid resuscitation patient was able to be taken off of the NE, but had to be restarted, and as such was admitted to critical care for JN, and probable infectious colitis.        Amount and/or Complexity of Data Reviewed  Labs: ordered. Decision-making details documented in ED Course.  Radiology: ordered and independent interpretation performed.    Risk  OTC drugs.  Prescription drug management.  Decision regarding hospitalization.      ED Course as of 04/01/25 2316   Fri Mar 28, 2025   1546 Patient states that the only reaction she has  had to a medication that involves shortness of breath and/or vomiting were Zofran and ciprofloxacin.  Unable to tell me what the reaction was to the cefaclor, but does not believe it was either of the aforementioned symptoms.  Therefore cefepime has been ordered, especially considering that they are in different generations.   1631 CBC and differential(!)  Anemic but at patient's baseline.   1644 Comprehensive metabolic panel(!)  Compared with outpatient labs, GFR taken on 3/5/2025 was 73.  Patient CMP is consistent with JN.  Fluids already ordered due to sepsis.       Medications   sodium chloride 0.9 % bolus 1,000 mL (0 mL Intravenous Stopped 3/28/25 1617)     Followed by   sodium chloride 0.9 % bolus 1,000 mL (0 mL Intravenous Stopped 3/28/25 1658)   cefepime (MAXIPIME) 2 g/50 mL dextrose IVPB (0 mg Intravenous Stopped 3/28/25 1642)   vancomycin (VANCOCIN) 1,750 mg in sodium chloride 0.9 % 500 mL IVPB (0 mg Intravenous Stopped 3/28/25 1845)   acetaminophen (TYLENOL) tablet 975 mg (975 mg Oral Given 3/28/25 1629)   gabapentin (NEURONTIN) capsule 600 mg (600 mg Oral Given 3/28/25 1655)   sodium chloride 0.9 % bolus 1,000 mL (0 mL Intravenous Stopped 3/28/25 1830)   methylPREDNISolone sodium succinate (Solu-MEDROL) injection 125 mg (125 mg Intravenous Given 3/28/25 1811)   iohexol (OMNIPAQUE) 350 MG/ML injection (MULTI-DOSE) 100 mL (100 mL Intravenous Given 3/28/25 1940)   piperacillin-tazobactam (ZOSYN) IVPB 4.5 g (has no administration in time range)       ED Risk Strat Scores                            SBIRT 22yo+      Flowsheet Row Most Recent Value   Initial Alcohol Screen: US AUDIT-C     1. How often do you have a drink containing alcohol? 6 Filed at: 03/28/2025 6324   2. How many drinks containing alcohol do you have on a typical day you are drinking?  1 Filed at: 03/28/2025 2958   3b. FEMALE Any Age, or MALE 65+: How often do you have 4 or more drinks on one occassion? 1 Filed at: 03/28/2025 0562   Audit-C  Score 8 Filed at: 03/28/2025 1534   ELIZABETH: How many times in the past year have you...    Used an illegal drug or used a prescription medication for non-medical reasons? Never Filed at: 03/28/2025 6434                            History of Present Illness       Chief Complaint   Patient presents with    Fever     Pt was at pcp.  Provider was concerned for sepsis.  Pt had a fever of 104 at was hypotensive for provider but not ems       Past Medical History:   Diagnosis Date    Anemia     Cancer (HCC)     Colon polyp     Degenerative joint disease     Disease of thyroid gland     underactive    FHx: non-Hodgkin's lymphoma     macroglobulin anemia    GERD (gastroesophageal reflux disease)     History of back surgery     History of chemotherapy 2014    lymphoma - [id number]    Lumbar disc disease     Shingles 02/02/2024      Past Surgical History:   Procedure Laterality Date    ANKLE SURGERY      2004,2005,2006    BACK SURGERY  10/1996    BREAST BIOPSY Left 06/2012    benign    BUNIONECTOMY  08/18/2015    CARPAL TUNNEL RELEASE Left 03/1995    CARPAL TUNNEL RELEASE Right 12/1999    COLONOSCOPY      CT EPIDURAL STEROID INJECTION (TERRELL LUMBAR)  8/26/2016    DISCECTOMY SPINE CERVICAL ANTERIOR W/ ARTHROPLASTY      C3/C4    FOOT GANGLION EXCISION Left 10/27/2017    FOOT SURGERY  08/09/2011    ankle/foot sx    HEMORRHOID SURGERY  2009    JOINT REPLACEMENT      KNEE CARTILAGE SURGERY Right     MOUTH SURGERY      ROTATOR CUFF REPAIR Left 05/14/2008    AIME-EN-Y PROCEDURE  03/31/2008    TOTAL SHOULDER REPLACEMENT  06/24/2014    UPPER GASTROINTESTINAL ENDOSCOPY        Family History   Problem Relation Age of Onset    Diabetes Mother     Hypertension Mother     Stroke Mother     Stroke Father     Leukemia Father     Thyroid disease Sister     Stroke Brother     Heart disease Brother       Social History     Tobacco Use    Smoking status: Former     Types: Cigarettes    Smokeless tobacco: Never   Vaping Use    Vaping status: Never Used    Substance Use Topics    Alcohol use: Yes     Comment: wine at night    Drug use: Yes     Types: Marijuana     Comment: Medical marijuana 03/01/2021      E-Cigarette/Vaping    E-Cigarette Use Never User       E-Cigarette/Vaping Substances    Nicotine No     THC No     CBD No     Flavoring No     Other No     Unknown No       I have reviewed and agree with the history as documented.     Anisa is a 70-year-old female with a past medical history of bariatric surgery, GERD, yeast infection, essential thrombocytopenia, DVT, HTN, PE, hypothyroidism, Hodgkin's lymphoma, who presents emergency department due to hypotension and fever at primary care's office.  Patient is being treated for non-Hodgkin's lymphoma, last chemotherapy IV treatment was on Monday, suspended due to patient not feeling well.  Was given an oral BTK blocker to take in the interim.  Patient is currently experiencing cough with occasional sputum production, and believes she may have a UTI vs UTI.  Reports that both of these complaints have been occurring for roughly a week.  Patient reports chest pain that only occurs when she coughs.  Reports that it is nonradiating, and across her lower ribs.    Through chart review, patient has also recently been having diarrhea, questionably due to opioid cessation versus infectious process.        Review of Systems   Constitutional:  Positive for chills and fever.   Eyes:  Negative for pain and visual disturbance.   Respiratory:  Positive for cough, chest tightness and shortness of breath.    Cardiovascular:  Positive for chest pain. Negative for leg swelling.   Gastrointestinal:  Positive for diarrhea. Negative for abdominal pain, constipation, nausea and vomiting.   Genitourinary:  Negative for dysuria and hematuria.   Skin:  Negative for rash and wound.   Neurological:  Negative for dizziness, syncope, light-headedness and headaches.   Psychiatric/Behavioral: Negative.             Objective       ED Triage  Vitals   Temperature Pulse Blood Pressure Respirations SpO2 Patient Position - Orthostatic VS   03/28/25 1533 03/28/25 1531 03/28/25 1531 03/28/25 1531 03/28/25 1531 03/28/25 1531   (!) 101.1 °F (38.4 °C) (!) 111 (!) 79/46 16 95 % Lying      Temp Source Heart Rate Source BP Location FiO2 (%) Pain Score    03/28/25 1533 03/28/25 1531 03/28/25 1531 -- 03/28/25 1531    Oral Monitor Left arm  No Pain      Vitals      Date and Time Temp Pulse SpO2 Resp BP Pain Score FACES Pain Rating User   04/01/25 1508 -- -- -- -- -- 5 -- AR   04/01/25 1457 98.5 °F (36.9 °C) 76 92 % 18 114/61 -- -- EW   04/01/25 1300 -- -- -- 18 -- -- -- EW   04/01/25 0800 -- -- -- -- -- No Pain -- AR   04/01/25 0749 97.9 °F (36.6 °C) -- -- 18 108/67 -- -- MM   04/01/25 0300 97.9 °F (36.6 °C) 75 -- 18 104/63 -- --    03/31/25 2130 -- -- -- -- -- 4 --    03/31/25 2000 98 °F (36.7 °C) 77 -- 17 110/67 -- --    03/31/25 1500 98 °F (36.7 °C) 81 100 % -- 111/68 -- -- KT   03/31/25 1121 98.1 °F (36.7 °C) -- -- -- 114/53 -- -- CD   03/31/25 0835 -- -- -- -- 104/65 -- -- PN   03/31/25 0834 -- -- -- -- 107/67 -- -- PN   03/31/25 0722 -- 77 -- 16 -- No Pain -- PN   03/31/25 0722 -- -- -- -- 107/66 -- -- CD   03/31/25 0500 97.8 °F (36.6 °C) 81 98 % 29 102/72 -- -- JN   03/30/25 2311 99.5 °F (37.5 °C) 85 -- 16 99/73 -- -- LM   03/30/25 2200 -- -- -- -- -- 3 -- JN   03/30/25 2111 -- 88 94 % -- -- -- -- EA   03/30/25 1900 99.3 °F (37.4 °C) 95 -- 20 95/61 -- -- LM   03/30/25 1853 99.1 °F (37.3 °C) 98 -- 18 95/61 -- -- EW   03/30/25 1515 -- 89 -- 18 100/62 -- -- ML   03/30/25 1500 98.8 °F (37.1 °C) 89 -- 22 112/61 -- -- EW   03/30/25 0819 -- 91 -- 26 90/55 -- -- ML   03/30/25 0700 -- -- 94 % -- -- -- -- ML   03/30/25 0700 98 °F (36.7 °C) 89 -- 17 90/51 -- -- AC   03/30/25 0600 98.1 °F (36.7 °C) 90 -- 23 94/62 -- -- JN   03/30/25 0546 -- -- -- -- -- 5 -- JN   03/30/25 0326 98.9 °F (37.2 °C) 88 -- 18 108/76 -- -- LM   03/29/25 2359 98.2 °F (36.8 °C) 81 -- 17 111/68  -- --    03/29/25 2122 98 °F (36.7 °C) 93 -- 15 102/67 -- -- JN   03/29/25 2000 -- -- -- -- -- No Pain -- JN   03/29/25 1900 98.2 °F (36.8 °C) -- -- -- 92/56 -- -- EW   03/29/25 1500 98.2 °F (36.8 °C) 93 94 % 20 106/84 -- -- AC   03/29/25 1300 -- 82 94 % 26 103/60 -- -- ML   03/29/25 1200 -- 85 95 % 22 98/74 -- -- ML   03/29/25 1133 -- -- 94 % -- -- -- -- ML   03/29/25 1100 -- 91 72 % 22 108/60 -- --    03/29/25 1056 -- -- 90 % 22 -- -- --    03/29/25 1056 98.1 °F (36.7 °C) 88 -- -- 97/62 -- -- AC   03/29/25 0800 -- 79 94 % 25 101/61 -- -- ML   03/29/25 0707 98 °F (36.7 °C) 96 95 % 18 104/67 -- -- ML   03/29/25 0700 -- -- -- -- -- No Pain --    03/29/25 0612 -- 78 91 % 18 107/61 -- --    03/29/25 0500 -- 91 89 % 41 104/76 -- --    03/29/25 0445 97.9 °F (36.6 °C) 85 92 % 37 104/68 -- --    03/29/25 0400 -- 80 93 % 19 100/59 No Pain --    03/29/25 0356 -- 80 94 % 19 107/65 -- --    03/29/25 0327 -- 80 96 % 25 100/61 -- --    03/29/25 0300 -- 82 93 % 26 96/60 -- --    03/29/25 0245 -- 82 -- 25 99/60 -- --    03/29/25 0230 -- 82 93 % 24 110/62 -- -- CJ   03/29/25 0221 -- 84 -- -- 122/78 -- -- CJ   03/28/25 2347 -- -- -- -- -- No Pain -- CJ 03/28/25 2346 98.3 °F (36.8 °C) 93 -- 20 97/54 -- -- CJ   03/28/25 2315 -- 100 94 % -- 105/56 -- -- KT   03/28/25 2301 -- -- 93 % -- -- -- -- KT   03/28/25 2241 -- -- -- -- 91/61 -- -- KT   03/28/25 2240 -- 94 93 % -- 98/61 -- -- KT 03/28/25 2237 -- 97 94 % -- 97/61 -- -- KT   03/28/25 2230 -- 88 94 % -- 116/73 -- -- KT   03/28/25 2227 -- 91 93 % -- 107/58 -- -- KT   03/28/25 2200 -- 90 96 % 16 121/65 -- -- JR   03/28/25 2153 -- 90 95 % 18 121/65 -- -- JR   03/28/25 2121 -- 91 94 % -- 90/51 -- -- KT 03/28/25 2111 -- 97 93 % -- 83/52 -- -- KT   03/28/25 2051 -- 98 93 % -- 106/59 -- -- KT   03/28/25 2040 -- 98 92 % -- 110/53 -- -- KT   03/28/25 2031 -- 106 90 % -- 120/56 -- -- KT   03/28/25 2030 -- 110 89 % 2 l NC applied -- -- -- -- KT   03/28/25 2022 --  -- -- -- 136/66 -- -- KT   03/28/25 2020 -- 100 90 % -- 136/66 -- -- KT   03/28/25 2008 -- 101 -- -- 133/65 -- -- KT   03/28/25 2000 -- 102 90 % -- 133/65 -- -- KT   03/28/25 1948 -- 100 -- -- 128/67 -- -- KT 03/28/25 1940 -- 101 92 % -- 120/57 -- -- KT   03/28/25 1924 -- 104 96 % -- 105/61 -- -- KT   03/28/25 1904 -- 109 97 % -- 108/73 -- -- KT   03/28/25 1849 -- 109 -- -- 109/73 -- -- KT 03/28/25 1848 -- -- 94 % -- -- -- -- KT 03/28/25 1833 -- 109 -- -- 117/66 -- -- KT   03/28/25 1820 -- 107 97 % -- 114/69 -- -- KT   03/28/25 1815 98.4 °F (36.9 °C) -- -- -- -- -- -- KT   03/28/25 1805 -- 105 94 % -- 116/65 -- -- KT 03/28/25 1804 -- -- -- -- -- 4 vaginal discomfort -- KT   03/28/25 1802 -- 108 92 % -- 117/53 -- -- KT   03/28/25 1747 -- 107 -- -- 83/53 -- -- KT 03/28/25 1734 -- 110 -- -- 80/58 -- -- KT   03/28/25 1725 -- 109 98 % -- 86/51 -- -- KT   03/28/25 1720 -- 110 97 % -- 90/54 -- -- KT   03/28/25 1639 99.7 °F (37.6 °C) -- -- -- -- -- -- KT   03/28/25 1635 -- -- -- -- 102/63 -- -- KT   03/28/25 1635 -- 114 96 % -- -- -- -- BB   03/28/25 1626 -- 113 -- -- 85/53 -- -- KT   03/28/25 1620 -- 116 97 % -- 85/53 -- -- KT   03/28/25 1619 -- 116 96 % -- 87/53 -- -- KT   03/28/25 1615 -- 118 95 % 18 68/39 -- -- KT   03/28/25 1611 -- -- -- -- 68/39 Dr. Ricci brought to bedside -- -- C(S   03/28/25 1533 101.1 °F (38.4 °C) -- -- -- -- -- -- KT   03/28/25 1531 -- 111 95 % 16 79/46 No Pain -- KT            Physical Exam  Vitals and nursing note reviewed.   Constitutional:       Appearance: Normal appearance.   HENT:      Head: Normocephalic and atraumatic.      Right Ear: External ear normal.      Left Ear: External ear normal.      Nose: Nose normal.      Mouth/Throat:      Mouth: Mucous membranes are moist.      Pharynx: Oropharynx is clear.   Eyes:      Extraocular Movements: Extraocular movements intact.      Conjunctiva/sclera: Conjunctivae normal.   Cardiovascular:      Rate and Rhythm: Regular rhythm.  Tachycardia present.      Heart sounds: Normal heart sounds.   Pulmonary:      Effort: Pulmonary effort is normal.      Breath sounds: Rales present.   Abdominal:      General: There is no distension.      Palpations: Abdomen is soft.      Tenderness: There is no abdominal tenderness. There is no guarding.   Genitourinary:     General: Normal vulva.      Vagina: No vaginal discharge.   Musculoskeletal:         General: Normal range of motion.      Cervical back: Normal range of motion and neck supple.      Right lower leg: No edema.      Left lower leg: No edema.   Skin:     General: Skin is warm and dry.   Neurological:      General: No focal deficit present.      Mental Status: She is alert and oriented to person, place, and time.   Psychiatric:         Mood and Affect: Mood normal.         Behavior: Behavior normal.         Results Reviewed       Procedure Component Value Units Date/Time    Blood culture #2 [[id number]] Collected: 03/28/25 1548    Lab Status: Preliminary result Specimen: Blood from Arm, Right Updated: 04/01/25 2001     Blood Culture No Growth After 4 Days.    Blood culture #1 [[id number]] Collected: 03/28/25 1607    Lab Status: Preliminary result Specimen: Blood from Hand, Left Updated: 04/01/25 2001     Blood Culture No Growth After 4 Days.    Stool Enteric Bacterial Panel by PCR [[id number]]  (Normal) Collected: 03/29/25 0929    Lab Status: Final result Specimen: Stool from Rectum Updated: 03/30/25 1113     Salmonella sp PCR Negative     Shigella sp/Enteroinvasive E. coli (EIEC) PCR Negative     Campylobacter sp (jejuni and coli) PCR Negative     Shiga toxin 1/Shiga toxin 2 genes PCR Negative    Narrative:      This test has been performed using the FDA-approved BD MAX system for the qualitative detection of Salmonella spp., Campylobacter spp. (jejuni and coli), Shigella spp./Enteroinvasive E. coli (EIEC), and Shiga toxin 1 (stx1)/Shiga toxin 2 (stx2) from unpreserved liquid or soft stool or  [facility] preserved stool specimens from patients with suspected acute gastroenteritis, enteritis, or colitis using polymerase chain reaction (PCR) methodology.    Negative PCR results do not preclude infection and should not be used as the sole basis for treatment or other patient management decisions.    Positive PCR results are indicative of infection, but do not necessarily indicate the presence of viable organisms and do not rule out co-infection with other bacteria or viruses.    As with all polymerase-chain reaction methodology, extremely low levels of target below the limit of detection may be detected, but results may not be reproducible.    All positive results are reflexed to culture for confirmation and/or for susceptibility testing.    All test results should be used as an adjunct to clinical observations and other information available to the provider.    Clostridioides difficile toxin by PCR with EIA [[id number]]  (Abnormal) Collected: 03/29/25 0012    Lab Status: Final result Specimen: Stool from Rectum Updated: 03/29/25 1219     C.difficile toxin by PCR Positive     C difficile Toxins A+B, EIA Positive    Narrative:      This test has been performed using either the FDA-approved BD MAX system or the FDA-approved [facility] system for the qualitative detection of Clostridioides difficile toxin B gene (tcdB) in human liquid or soft stool specimens from patients suspected of having Clostridioides difficile infection using polymerase chain reaction (PCR) methodology.    Negative PCR results do not preclude infection and should not be used as the sole basis for treatment or other patient management decisions.    Positive PCR results are indicative of colonization or infection, but do not rule out co-infection with other bacteria or viruses.    As with all polymerase-chain reaction methodology, extremely low levels of target below the limit of detection may be detected, but results may not be  reproducible.    All positive results are reflexed to a toxin A & B enzyme immunoassay (EIA) to distinguish between active infection and colonization.  Positive EIA results are indicative of an active infection.  Negative EIA results are indicative of colonization without active injection.    All test results should be used as an adjunct to clinical observations and other information available to the provider.    Ova and parasite examination [[id number]] Collected: 03/29/25 0929    Lab Status: In process Specimen: Stool from Rectum Updated: 03/29/25 0959    CBC and differential [[id number]]  (Abnormal) Collected: 03/29/25 0452    Lab Status: Final result Specimen: Blood from Arm, Left Updated: 03/29/25 0755     WBC 4.19 Thousand/uL      RBC 2.66 Million/uL      Hemoglobin 9.8 g/dL      Hematocrit 29.5 %       fL      MCH 36.8 pg      MCHC 33.2 g/dL      RDW 13.1 %      MPV 9.4 fL      Platelets 185 Thousands/uL     Narrative:      This is an appended report.  These results have been appended to a previously verified report.    COVID/FLU/RSV [[id number]]  (Normal) Collected: 03/29/25 0645    Lab Status: Final result Specimen: Nares from Nose Updated: 03/29/25 0734     SARS-CoV-2 Negative     INFLUENZA A PCR Negative     INFLUENZA B PCR Negative     RSV PCR Negative    Narrative:      This test has been performed using the CoV-2/Flu/RSV plus assay on the [employer] GeneXpert platform. This test has been validated by the  and verified by the performing laboratory.     This test is designed to amplify and detect the following: nucleocapsid (N), envelope (E), and RNA-dependent RNA polymerase (RdRP) genes of the SARS-CoV-2 genome; matrix (M), basic polymerase (PB2), and acidic protein (PA) segments of the influenza A genome; matrix (M) and non-structural protein (NS) segments of the influenza B genome, and the nucleocapsid genes of RSV A and RSV B.     Positive results are indicative of the presence of Flu A,  Flu B, RSV, and/or SARS-CoV-2 RNA. Positive results for SARS-CoV-2 or suspected novel influenza should be reported to state, local, or federal health departments according to local reporting requirements.      All results should be assessed in conjunction with clinical presentation and other laboratory markers for clinical management.     FOR PEDIATRIC PATIENTS - copy/paste COVID Guidelines URL to browser: [email or web address]       Vitamin B12 [[id number]]  (Abnormal) Collected: 03/28/25 1548    Lab Status: Final result Specimen: Blood from Arm, Right Updated: 03/29/25 0720     Vitamin B-12 1,924 pg/mL     Basic metabolic panel [[id number]]  (Abnormal) Collected: 03/29/25 0452    Lab Status: Final result Specimen: Blood from Arm, Left Updated: 03/29/25 0527     Sodium 138 mmol/L      Potassium 4.0 mmol/L      Chloride 105 mmol/L      CO2 23 mmol/L      ANION GAP 10 mmol/L      BUN 21 mg/dL      Creatinine 0.88 mg/dL      Glucose 143 mg/dL      Calcium 8.1 mg/dL      eGFR 67 ml/min/1.73sq m     Narrative:      National Kidney Disease Foundation guidelines for Chronic Kidney Disease (CKD):     Stage 1 with normal or high GFR (GFR > 90 mL/min/1.73 square meters)    Stage 2 Mild CKD (GFR = 60-89 mL/min/1.73 square meters)    Stage 3A Moderate CKD (GFR = 45-59 mL/min/1.73 square meters)    Stage 3B Moderate CKD (GFR = 30-44 mL/min/1.73 square meters)    Stage 4 Severe CKD (GFR = 15-29 mL/min/1.73 square meters)    Stage 5 End Stage CKD (GFR <15 mL/min/1.73 square meters)  Note: GFR calculation is accurate only with a steady state creatinine    Magnesium [[id number]]  (Abnormal) Collected: 03/29/25 0452    Lab Status: Final result Specimen: Blood from Arm, Left Updated: 03/29/25 0527     Magnesium 1.8 mg/dL     T4, free [[id number]]  (Normal) Collected: 03/28/25 1548    Lab Status: Final result Specimen: Blood from Arm, Right Updated: 03/29/25 0515     Free T4 0.97 ng/dL   "   Narrative:        \"Therapeutic range for patients medicated with thyroid disorders: 0.61-1.24 ng/dL.\"    Calcium, ionized [[id number]]  (Normal) Collected: 03/29/25 0452    Lab Status: Final result Specimen: Blood from Arm, Left Updated: 03/29/25 0511     Calcium, Ionized 1.13 mmol/L     Folate [[id number]]  (Normal) Collected: 03/28/25 1548    Lab Status: Final result Specimen: Blood from Arm, Right Updated: 03/29/25 0511     Folate >22.3 ng/mL     Blood gas, venous [[id number]]  (Abnormal) Collected: 03/29/25 0024    Lab Status: Final result Specimen: Blood from Arm, Left Updated: 03/29/25 0035     pH, Sancho 7.370     pCO2, Sancho 37.9 mm Hg      pO2, Sancho 42.1 mm Hg      HCO3, Sancho 21.4 mmol/L      Base Excess, Sancho -3.5 mmol/L      O2 Content, Sancho 11.6 ml/dL      O2 HGB, VENOUS 73.9 %     TSH, 3rd generation with Free T4 reflex [[id number]]  (Abnormal) Collected: 03/28/25 1548    Lab Status: Final result Specimen: Blood from Arm, Right Updated: 03/29/25 0016     TSH 3RD GENERATON 0.367 uIU/mL     Urine Microscopic [[id number]]  (Normal) Collected: 03/28/25 1928    Lab Status: Final result Specimen: Urine, Other Updated: 03/28/25 2018     RBC, UA 1-2 /hpf      WBC, UA 1-2 /hpf      Epithelial Cells Occasional /hpf      Bacteria, UA None Seen /hpf     UA w Reflex to Microscopic w Reflex to Culture [[id number]]  (Abnormal) Collected: 03/28/25 1928    Lab Status: Final result Specimen: Urine, Other Updated: 03/28/25 2017     Color, UA Yellow     Clarity, UA Clear     Specific Gravity, UA 1.007     pH, UA 5.5     Leukocytes, UA Trace     Nitrite, UA Negative     Protein, UA Negative mg/dl      Glucose, UA Negative mg/dl      Ketones, UA Negative mg/dl      Urobilinogen, UA <2.0 mg/dl      Bilirubin, UA Negative     Occult Blood, UA Negative    FLU/COVID Rapid Antigen (30 min. TAT) - Preferred screening test in ED [[id number]]  (Normal) Collected: 03/28/25 1648    Lab Status: Final result Specimen: Nares from Nose Updated: " 03/28/25 1713     SARS COV Rapid Antigen Negative     Influenza A Rapid Antigen Negative     Influenza B Rapid Antigen Negative    Narrative:      This test has been performed using the [employer] 2 FLU+SARS Antigen test under the Emergency Use Authorization (EUA). This test has been validated by the  and verified by the performing laboratory. The Christa uses lateral flow immunofluorescent sandwich assay to detect SARS-COV, Influenza A and Influenza B Antigen.     The [employer] Christ[employer] 2 SARS Antigen test does not differentiate between SARS-CoV and SARS-CoV-2.     Negative results are presumptive and may be confirmed with a molecular assay, if necessary, for patient management. Negative results do not rule out SARS-CoV-2 or influenza infection and should not be used as the sole basis for treatment or patient management decisions. A negative test result may occur if the level of antigen in a sample is below the limit of detection of this test.     Positive results are indicative of the presence of viral antigens, but do not rule out bacterial infection or co-infection with other viruses.     All test results should be used as an adjunct to clinical observations and other information available to the provider.    FOR PEDIATRIC PATIENTS - copy/paste COVID Guidelines URL to browser: [email or web address]    RBC Morphology Reflex Test [[id number]] Collected: 03/28/25 1548    Lab Status: Final result Specimen: Blood from Arm, Right Updated: 03/28/25 1701    Procalcitonin [[id number]]  (Abnormal) Collected: 03/28/25 1548    Lab Status: Final result Specimen: Blood from Arm, Right Updated: 03/28/25 1644     Procalcitonin 3.32 ng/ml     CBC and differential [[id number]]  (Abnormal) Collected: 03/28/25 1548    Lab Status: Final result Specimen: Blood from Arm, Right Updated: 03/28/25 1643     WBC 5.11 Thousand/uL      RBC 2.62 Million/uL      Hemoglobin 9.8 g/dL       Hematocrit 29.9 %       fL      MCH 37.4 pg      MCHC 32.8 g/dL      RDW 13.2 %      MPV 9.8 fL      Platelets 201 Thousands/uL     Narrative:      This is an appended report.  These results have been appended to a previously verified report.    Manual Differential(PHLEBS Do Not Order) [[id number]]  (Abnormal) Collected: 03/28/25 1548    Lab Status: Final result Specimen: Blood from Arm, Right Updated: 03/28/25 1643     Segmented % 43 %      Bands % 10 %      Lymphocytes % 24 %      Monocytes % 13 %      Eosinophils % 1 %      Basophils % 0 %      Metamyelocytes % 4 %      Myelocytes % 3 %      Atypical Lymphocytes % 2 %      Absolute Neutrophils 2.71 Thousand/uL      Absolute Lymphocytes 1.33 Thousand/uL      Absolute Monocytes 0.66 Thousand/uL      Absolute Eosinophils 0.05 Thousand/uL      Absolute Basophils 0.00 Thousand/uL      Absolute Metamyelocytes 0.20 Thousand/uL      Absolute Myelocytes 0.15 Thousand/uL      Total Counted --     RBC Morphology Present     Platelet Estimate Adequate     Anisocytosis Present    Comprehensive metabolic panel [[id number]]  (Abnormal) Collected: 03/28/25 1548    Lab Status: Final result Specimen: Blood from Arm, Right Updated: 03/28/25 1638     Sodium 133 mmol/L      Potassium 4.2 mmol/L      Chloride 99 mmol/L      CO2 26 mmol/L      ANION GAP 8 mmol/L      BUN 26 mg/dL      Creatinine 1.69 mg/dL      Glucose 95 mg/dL      Calcium 8.7 mg/dL      Corrected Calcium 9.3 mg/dL      AST 16 U/L      ALT 10 U/L      Alkaline Phosphatase 53 U/L      Total Protein 4.9 g/dL      Albumin 3.2 g/dL      Total Bilirubin 0.85 mg/dL      eGFR 30 ml/min/1.73sq m     Narrative:      [employer] guidelines for Chronic Kidney Disease (CKD):     Stage 1 with normal or high GFR (GFR > 90 mL/min/1.73 square meters)    Stage 2 Mild CKD (GFR = 60-89 mL/min/1.73 square meters)    Stage 3A Moderate CKD (GFR = 45-59 mL/min/1.73 square meters)    Stage 3B Moderate CKD (GFR =  30-44 mL/min/1.73 square meters)    Stage 4 Severe CKD (GFR = 15-29 mL/min/1.73 square meters)    Stage 5 End Stage CKD (GFR <15 mL/min/1.73 square meters)  Note: GFR calculation is accurate only with a steady state creatinine    Lactic acid [[id number]]  (Normal) Collected: 03/28/25 1548    Lab Status: Final result Specimen: Blood from Arm, Right Updated: 03/28/25 1637     LACTIC ACID 0.8 mmol/L     Narrative:      Result may be elevated if tourniquet was used during collection.    Protime-INR [[id number]]  (Normal) Collected: 03/28/25 1548    Lab Status: Final result Specimen: Blood from Arm, Right Updated: 03/28/25 1630     Protime 14.1 seconds      INR 1.02    Narrative:      INR Therapeutic Range    Indication                                             INR Range      Atrial Fibrillation                                               2.0-3.0  Hypercoagulable State                                    2.0.2.3  Left Ventricular Asist Device                            2.0-3.0  Mechanical Heart Valve                                  -    Aortic(with afib, MI, embolism, HF, LA enlargement,    and/or coagulopathy)                                     2.0-3.0 (2.5-3.5)     Mitral                                                             2.5-3.5  Prosthetic/Bioprosthetic Heart Valve               2.0-3.0  Venous thromboembolism (VTE: VT, PE        2.0-3.0    APTT [[id number]]  (Normal) Collected: 03/28/25 1548    Lab Status: Final result Specimen: Blood from Arm, Right Updated: 03/28/25 1630     PTT 33 seconds             CT chest abdomen pelvis w contrast   Final Interpretation by Marcio Sharif MD (03/28 2147)      1.  No acute intrathoracic abnormalities.   2.  No thoracic aortic aneurysm or dissection. No acute central/saddle pulmonary embolism. Evaluation of more peripheral pulmonary arterial branches is limited on this study due to timing of contrast bolus/technique.   3.  Bibasilar dependent atelectasis without lobar  airspace consolidation/pneumonia or pleural effusions.   4.  Findings consistent with mild proximal colitis and severe mid to distal colitis as detailed above likely due to infectious/inflammatory etiologies. Can not exclude pseudomembranous colitis especially involving the mid to distal colon. Clinical    correlation recommended. No pericolonic free air or abscess. Distended appendix measuring up to 8 mm without surrounding inflammatory changes. Findings may reflect reactive changes of the appendix given diffuse colitis.   5.  Trace lower pelvic free fluid. No loculated fluid collection/abscess. No evidence of bowel obstruction or free air.   6.  Additional ancillary findings detailed above.               Workstation performed: [id number]         XR chest portable   ED Interpretation by Agata Ricci MD (03/28 1651)   No acute cardiac, pulmonary, or osseous processes noted.  Some increased opacity on the left side, possibly underinflation versus angling.  Do not currently suspect pneumonia      Final Interpretation by Balta Lea MD (03/28 2001)      Low lung volumes with bibasilar atelectasis.            Workstation performed: [id number]             Procedures    ED Medication and Procedure Management   Prior to Admission Medications   Prescriptions Last Dose Informant Patient Reported? Taking?   B Complex-Biotin-FA (B Complete) TABS 3/29/2025 Morning Self Yes Yes   Sig: Take by mouth   Biotin 1000 MCG CHEW 3/29/2025 Morning Self Yes Yes   Sig: Chew   Brukinsa 80 MG CAPS Past Week Self Yes Yes   Patient taking differently: No sig reported   Cholecalciferol (VITAMIN D3) 1,000 units tablet Past Week  Yes Yes   Sig: Take 2,000 Units by mouth 3 (three) times a week   Ferrous Sulfate Dried (FERROUS SULFATE CR PO) 3/29/2025 Morning Self Yes Yes   Sig: Take 65 mg by mouth every other day   Lactobacillus (ACIDOPHOLUS PO) 3/29/2025 Morning Self Yes Yes   Sig: Take by mouth   Multiple Vitamins-Minerals (ONE DAILY WOMENS  50 PLUS PO) 3/29/2025 Morning Self Yes Yes   Sig: One Daily Womens 50 Plus   OXYCODONE HCL PO 3/29/2025 Morning Self Yes Yes   Sig: Take 10 mg by mouth 3 (three) times a day   Patient taking differently: Take 10 mg by mouth 3 (three) times a day as needed   Omega 3-6-9 Fatty Acids (OMEGA 3-6-9 COMPLEX) CAPS 3/29/2025 Morning Self Yes Yes   Sig: omega 3,6,9 combination no.7 92 mg (43 mg-22 [id number]) chew tablet   Potassium (POTASSIMIN) 75 MG TABS 3/29/2025 Morning Self Yes Yes   Sig: Take by mouth   acetaminophen (TYLENOL) 500 mg tablet Past Month Self No Yes   Sig: Take 1 tablet (500 mg total) by mouth every 6 (six) hours as needed for mild pain   allopurinol (ZYLOPRIM) 100 mg tablet 3/29/2025 Morning Self Yes Yes   Sig: Take 100 mg by mouth daily   ascorbic acid (VITAMIN C) 250 MG tablet 3/29/2025 Morning Self Yes Yes   Sig: Take 100 mg by mouth daily    bepotastine besilate (BEPREVE) 1.5 % op soln More than a month Self Yes No   Sig: Apply to eye daily   Patient not taking: Reported on 3/26/2025   colestipol (COLESTID) 1 g tablet Past Week Self No Yes   Sig: Take 1 tablet (1 g total) by mouth 4 (four) times a day   cyclobenzaprine (FLEXERIL) 10 mg tablet 3/29/2025 Morning Self Yes Yes   Sig: 3 (three) times a day   Patient taking differently: 3 (three) times a day as needed   fexofenadine (Allegra Allergy) 60 MG tablet More than a month Self Yes No   Sig: Take 60 mg by mouth daily   Patient taking differently: Take 60 mg by mouth daily as needed seasonal   gabapentin (NEURONTIN) 300 mg capsule  Self Yes No   Sig: take 2 capsules by mouth three times a day   Patient taking differently: Take 800 mg by mouth 3 (three) times a day   hydroCHLOROthiazide 12.5 mg tablet 3/28/2025 Self Yes Yes   Sig: Take 12.5 mg by mouth every evening 5:00pm   levothyroxine 50 mcg tablet 3/29/2025 Self Yes Yes   Sig: Take 50 mcg by mouth daily   lisinopril-hydrochlorothiazide (PRINZIDE,ZESTORETIC) 10-12.5 MG per tablet 3/29/2025  Morning Self Yes Yes   Sig: lisinopril 10 mg-hydrochlorothiazide 12.5 mg tablet   loperamide (IMODIUM A-D) 2 MG tablet 3/28/2025 Self Yes Yes   Sig: Take 2 mg by mouth 4 (four) times a day as needed for diarrhea   melatonin 1 mg 3/28/2025 Self Yes Yes   Sig: Take 5 mg by mouth daily at bedtime   sucralfate (CARAFATE) 1 g tablet More than a month Self No No   Sig: Take 1 tablet (1 g total) by mouth 3 (three) times a day before meals   urea (CARMOL) 20 % cream   No No   Sig: Apply topically as needed for dry skin   vitamin E (TOCOPHEROL) capsule 3/29/2025 Morning Self Yes Yes   Sig: Take 400 Units by mouth daily      Facility-Administered Medications: None     Discharge Medication List as of 4/1/2025  3:48 PM        START taking these medications    Details   vancomycin (VANCOCIN) 125 MG capsule Take 1 capsule (125 mg total) by mouth 4 (four) times a day for 42 doses, Starting Tue 4/1/2025, Until Sat 4/12/2025, Normal           CONTINUE these medications which have NOT CHANGED    Details   acetaminophen (TYLENOL) 500 mg tablet Take 1 tablet (500 mg total) by mouth every 6 (six) hours as needed for mild pain, Starting Tue 9/21/2021, No Print      allopurinol (ZYLOPRIM) 100 mg tablet Take 100 mg by mouth daily, Historical Med      ascorbic acid (VITAMIN C) 250 MG tablet Take 100 mg by mouth daily , Historical Med      B Complex-Biotin-FA (B Complete) TABS Take by mouth, Historical Med      Biotin 1000 MCG CHEW Chew, Historical Med      Brukinsa 80 MG CAPS Historical Med      Cholecalciferol (VITAMIN D3) 1,000 units tablet Take 2,000 Units by mouth 3 (three) times a week, Historical Med      colestipol (COLESTID) 1 g tablet Take 1 tablet (1 g total) by mouth 4 (four) times a day, Starting Fri 8/13/2021, Normal      cyclobenzaprine (FLEXERIL) 10 mg tablet 3 (three) times a day, Historical Med      Ferrous Sulfate Dried (FERROUS SULFATE CR PO) Take 65 mg by mouth every other day, Historical Med      hydroCHLOROthiazide  12.5 mg tablet Take 12.5 mg by mouth every evening 5:00pm, Historical Med      Lactobacillus (ACIDOPHOLUS PO) Take by mouth, Historical Med      levothyroxine 50 mcg tablet Take 50 mcg by mouth daily, Starting Tue 8/6/2019, Until Sat 3/29/2025, Historical Med      lisinopril-hydrochlorothiazide (PRINZIDE,ZESTORETIC) 10-12.5 MG per tablet lisinopril 10 mg-hydrochlorothiazide 12.5 mg tablet, Historical MedPaused since today until manually resumed      loperamide (IMODIUM A-D) 2 MG tablet Take 2 mg by mouth 4 (four) times a day as needed for diarrhea, Historical MedPaused since today until manually resumed      melatonin 1 mg Take 5 mg by mouth daily at bedtime, Historical Med      Multiple Vitamins-Minerals (ONE DAILY WOMENS 50 PLUS PO) One Daily Womens 50 Plus, Historical Med      Omega 3-6-9 Fatty Acids (OMEGA 3-6-9 COMPLEX) CAPS omega 3,6,9 combination no.7 92 mg (43 mg-22 [id number]) chew tablet, Historical Med      Potassium (POTASSIMIN) 75 MG TABS Take by mouth, Historical Med      vitamin E (TOCOPHEROL) capsule Take 400 Units by mouth daily, Historical Med      fexofenadine (Allegra Allergy) 60 MG tablet Take 60 mg by mouth daily, Historical Med      gabapentin (NEURONTIN) 300 mg capsule take 2 capsules by mouth three times a day, Historical Med      sucralfate (CARAFATE) 1 g tablet Take 1 tablet (1 g total) by mouth 3 (three) times a day before meals, Starting Tue 3/12/2024, Until Wed 7/10/2024, Normal           STOP taking these medications       OXYCODONE HCL PO Comments:   Reason for Stopping:         bepotastine besilate (BEPREVE) 1.5 % op soln Comments:   Reason for Stopping:         urea (CARMOL) 20 % cream Comments:   Reason for Stopping:             No discharge procedures on file.  ED SEPSIS DOCUMENTATION   Time reflects when diagnosis was documented in both MDM as applicable and the Disposition within this note       Time User Action Codes Description Comment    3/28/2025  4:42 PM Agata Ricci  Add [A41.9] Sepsis ([facility])     3/28/2025  4:42 PM Agata Ricci Add [N17.9] JN (acute kidney injury) ([facility])     3/28/2025 10:43 PM Katerina Kraus Add [K52.9] Colitis     3/28/2025 10:44 PM Katerina Kraus Add [I95.9] [facility]                  Agata Ricci MD  04/01/25 6214

## 2025-03-29 PROBLEM — Z98.84 S/P BARIATRIC SURGERY: Status: ACTIVE | Noted: 2020-02-04

## 2025-03-29 PROBLEM — K52.9 CHRONIC DIARRHEA: Status: ACTIVE | Noted: 2025-03-29

## 2025-03-29 PROBLEM — N17.9 AKI (ACUTE KIDNEY INJURY) (HCC): Status: RESOLVED | Noted: 2021-09-20 | Resolved: 2025-03-29

## 2025-03-29 PROBLEM — J96.01 ACUTE RESPIRATORY FAILURE WITH HYPOXIA (HCC): Status: RESOLVED | Noted: 2025-03-28 | Resolved: 2025-03-29

## 2025-03-29 PROBLEM — R57.9 SHOCK (HCC): Status: RESOLVED | Noted: 2025-03-28 | Resolved: 2025-03-29

## 2025-03-29 PROBLEM — Z79.891 CHRONICALLY ON OPIATE THERAPY: Status: ACTIVE | Noted: 2022-07-08

## 2025-03-29 LAB
ANION GAP SERPL CALCULATED.3IONS-SCNC: 10 MMOL/L (ref 4–13)
ANISOCYTOSIS BLD QL SMEAR: PRESENT
BASE EX.OXY STD BLDV CALC-SCNC: 73.9 % (ref 60–80)
BASE EXCESS BLDV CALC-SCNC: -3.5 MMOL/L
BASOPHILS # BLD MANUAL: 0.04 THOUSAND/UL (ref 0–0.1)
BASOPHILS NFR MAR MANUAL: 1 % (ref 0–1)
BUN SERPL-MCNC: 21 MG/DL (ref 5–25)
C DIFF TOX A+B STL QL IA: POSITIVE
C DIFF TOX GENS STL QL NAA+PROBE: POSITIVE
CA-I BLD-SCNC: 1.13 MMOL/L (ref 1.12–1.32)
CALCIUM SERPL-MCNC: 8.1 MG/DL (ref 8.4–10.2)
CHLORIDE SERPL-SCNC: 105 MMOL/L (ref 96–108)
CO2 SERPL-SCNC: 23 MMOL/L (ref 21–32)
CREAT SERPL-MCNC: 0.88 MG/DL (ref 0.6–1.3)
EOSINOPHIL # BLD MANUAL: 0 THOUSAND/UL (ref 0–0.4)
EOSINOPHIL NFR BLD MANUAL: 0 % (ref 0–6)
ERYTHROCYTE [DISTWIDTH] IN BLOOD BY AUTOMATED COUNT: 13.1 % (ref 11.6–15.1)
FLUAV RNA RESP QL NAA+PROBE: NEGATIVE
FLUBV RNA RESP QL NAA+PROBE: NEGATIVE
FOLATE SERPL-MCNC: >22.3 NG/ML
GFR SERPL CREATININE-BSD FRML MDRD: 67 ML/MIN/1.73SQ M
GLUCOSE SERPL-MCNC: 143 MG/DL (ref 65–140)
HCO3 BLDV-SCNC: 21.4 MMOL/L (ref 24–30)
HCT VFR BLD AUTO: 29.5 % (ref 34.8–46.1)
HGB BLD-MCNC: 9.8 G/DL (ref 11.5–15.4)
LYMPHOCYTES # BLD AUTO: 0.54 THOUSAND/UL (ref 0.6–4.47)
LYMPHOCYTES # BLD AUTO: 13 % (ref 14–44)
MACROCYTES BLD QL AUTO: PRESENT
MAGNESIUM SERPL-MCNC: 1.8 MG/DL (ref 1.9–2.7)
MCH RBC QN AUTO: 36.8 PG (ref 26.8–34.3)
MCHC RBC AUTO-ENTMCNC: 33.2 G/DL (ref 31.4–37.4)
MCV RBC AUTO: 111 FL (ref 82–98)
METAMYELOCYTE ABSOLUTE CT: 0.04 THOUSAND/UL (ref 0–0.1)
METAMYELOCYTES NFR BLD MANUAL: 1 % (ref 0–1)
MONOCYTES # BLD AUTO: 0.29 THOUSAND/UL (ref 0–1.22)
MONOCYTES NFR BLD: 7 % (ref 4–12)
MYELOCYTE ABSOLUTE CT: 0.04 THOUSAND/UL (ref 0–0.1)
MYELOCYTES NFR BLD MANUAL: 1 % (ref 0–1)
NEUTROPHILS # BLD MANUAL: 3.23 THOUSAND/UL (ref 1.85–7.62)
NEUTS BAND NFR BLD MANUAL: 6 % (ref 0–8)
NEUTS SEG NFR BLD AUTO: 71 % (ref 43–75)
O2 CT BLDV-SCNC: 11.6 ML/DL
PCO2 BLDV: 37.9 MM HG (ref 42–50)
PH BLDV: 7.37 [PH] (ref 7.3–7.4)
PLATELET # BLD AUTO: 185 THOUSANDS/UL (ref 149–390)
PLATELET BLD QL SMEAR: ADEQUATE
PMV BLD AUTO: 9.4 FL (ref 8.9–12.7)
PO2 BLDV: 42.1 MM HG (ref 35–45)
POLYCHROMASIA BLD QL SMEAR: PRESENT
POTASSIUM SERPL-SCNC: 4 MMOL/L (ref 3.5–5.3)
RBC # BLD AUTO: 2.66 MILLION/UL (ref 3.81–5.12)
RBC MORPH BLD: PRESENT
RSV RNA RESP QL NAA+PROBE: NEGATIVE
SARS-COV-2 RNA RESP QL NAA+PROBE: NEGATIVE
SODIUM SERPL-SCNC: 138 MMOL/L (ref 135–147)
T4 FREE SERPL-MCNC: 0.97 NG/DL (ref 0.61–1.12)
TSH SERPL DL<=0.05 MIU/L-ACNC: 0.37 UIU/ML (ref 0.45–4.5)
VIT B12 SERPL-MCNC: 1924 PG/ML (ref 180–914)
WBC # BLD AUTO: 4.19 THOUSAND/UL (ref 4.31–10.16)

## 2025-03-29 PROCEDURE — 87177 OVA AND PARASITES SMEARS: CPT

## 2025-03-29 PROCEDURE — 83735 ASSAY OF MAGNESIUM: CPT

## 2025-03-29 PROCEDURE — 87505 NFCT AGENT DETECTION GI: CPT

## 2025-03-29 PROCEDURE — 0241U HB NFCT DS VIR RESP RNA 4 TRGT: CPT

## 2025-03-29 PROCEDURE — 82330 ASSAY OF CALCIUM: CPT

## 2025-03-29 PROCEDURE — 85027 COMPLETE CBC AUTOMATED: CPT

## 2025-03-29 PROCEDURE — 85007 BL SMEAR W/DIFF WBC COUNT: CPT

## 2025-03-29 PROCEDURE — 87209 SMEAR COMPLEX STAIN: CPT

## 2025-03-29 PROCEDURE — 80048 BASIC METABOLIC PNL TOTAL CA: CPT

## 2025-03-29 PROCEDURE — 99232 SBSQ HOSP IP/OBS MODERATE 35: CPT | Performed by: INTERNAL MEDICINE

## 2025-03-29 PROCEDURE — NC001 PR NO CHARGE: Performed by: INTERNAL MEDICINE

## 2025-03-29 PROCEDURE — 82805 BLOOD GASES W/O2 SATURATION: CPT

## 2025-03-29 RX ORDER — GABAPENTIN 400 MG/1
800 CAPSULE ORAL 3 TIMES DAILY
Status: DISCONTINUED | OUTPATIENT
Start: 2025-03-29 | End: 2025-04-01 | Stop reason: HOSPADM

## 2025-03-29 RX ORDER — MAGNESIUM SULFATE HEPTAHYDRATE 40 MG/ML
2 INJECTION, SOLUTION INTRAVENOUS ONCE
Status: COMPLETED | OUTPATIENT
Start: 2025-03-29 | End: 2025-03-29

## 2025-03-29 RX ADMIN — SODIUM CHLORIDE, SODIUM ACETATE ANHYDROUS, SODIUM GLUCONATE, POTASSIUM CHLORIDE, AND MAGNESIUM CHLORIDE 125 ML/HR: 526; 222; 502; 37; 30 INJECTION, SOLUTION INTRAVENOUS at 13:36

## 2025-03-29 RX ADMIN — GABAPENTIN 800 MG: 400 CAPSULE ORAL at 08:13

## 2025-03-29 RX ADMIN — MELATONIN 4.5 MG: 3 TAB ORAL at 21:17

## 2025-03-29 RX ADMIN — VANCOMYCIN HYDROCHLORIDE 125 MG: 125 CAPSULE ORAL at 00:06

## 2025-03-29 RX ADMIN — MAGNESIUM SULFATE HEPTAHYDRATE 2 G: 40 INJECTION, SOLUTION INTRAVENOUS at 06:45

## 2025-03-29 RX ADMIN — CHLORHEXIDINE GLUCONATE 15 ML: 1.2 SOLUTION ORAL at 21:17

## 2025-03-29 RX ADMIN — VANCOMYCIN HYDROCHLORIDE 125 MG: 125 CAPSULE ORAL at 05:01

## 2025-03-29 RX ADMIN — PIPERACILLIN SODIUM AND TAZOBACTAM SODIUM 4.5 G: 36; 4.5 INJECTION, POWDER, LYOPHILIZED, FOR SOLUTION INTRAVENOUS at 16:34

## 2025-03-29 RX ADMIN — VANCOMYCIN HYDROCHLORIDE 125 MG: 125 CAPSULE ORAL at 17:18

## 2025-03-29 RX ADMIN — HEPARIN SODIUM 5000 UNITS: 5000 INJECTION INTRAVENOUS; SUBCUTANEOUS at 05:01

## 2025-03-29 RX ADMIN — CHLORHEXIDINE GLUCONATE 15 ML: 1.2 SOLUTION ORAL at 08:14

## 2025-03-29 RX ADMIN — SODIUM CHLORIDE, SODIUM ACETATE ANHYDROUS, SODIUM GLUCONATE, POTASSIUM CHLORIDE, AND MAGNESIUM CHLORIDE 125 ML/HR: 526; 222; 502; 37; 30 INJECTION, SOLUTION INTRAVENOUS at 00:06

## 2025-03-29 RX ADMIN — LEVOTHYROXINE SODIUM 50 MCG: 0.05 TABLET ORAL at 05:01

## 2025-03-29 RX ADMIN — HEPARIN SODIUM 5000 UNITS: 5000 INJECTION INTRAVENOUS; SUBCUTANEOUS at 00:06

## 2025-03-29 RX ADMIN — GABAPENTIN 800 MG: 400 CAPSULE ORAL at 15:08

## 2025-03-29 RX ADMIN — MELATONIN 4.5 MG: 3 TAB ORAL at 00:06

## 2025-03-29 RX ADMIN — HEPARIN SODIUM 5000 UNITS: 5000 INJECTION INTRAVENOUS; SUBCUTANEOUS at 13:28

## 2025-03-29 RX ADMIN — VANCOMYCIN HYDROCHLORIDE 125 MG: 125 CAPSULE ORAL at 11:24

## 2025-03-29 RX ADMIN — PIPERACILLIN SODIUM AND TAZOBACTAM SODIUM 4.5 G: 36; 4.5 INJECTION, POWDER, LYOPHILIZED, FOR SOLUTION INTRAVENOUS at 09:20

## 2025-03-29 RX ADMIN — SODIUM CHLORIDE, SODIUM ACETATE ANHYDROUS, SODIUM GLUCONATE, POTASSIUM CHLORIDE, AND MAGNESIUM CHLORIDE 125 ML/HR: 526; 222; 502; 37; 30 INJECTION, SOLUTION INTRAVENOUS at 07:11

## 2025-03-29 RX ADMIN — CHLORHEXIDINE GLUCONATE 15 ML: 1.2 SOLUTION ORAL at 00:06

## 2025-03-29 RX ADMIN — GABAPENTIN 800 MG: 400 CAPSULE ORAL at 21:17

## 2025-03-29 RX ADMIN — HEPARIN SODIUM 5000 UNITS: 5000 INJECTION INTRAVENOUS; SUBCUTANEOUS at 21:17

## 2025-03-29 RX ADMIN — Medication 4.5 G: at 04:48

## 2025-03-29 NOTE — ASSESSMENT & PLAN NOTE
BP on admission 79/56, map 56   Last BP in office 3/26- 90/57  Shock likely secondary sepsis  Received Solumedrol in ED   Last ECHO 2021- EF 50-55%, G1DD    Plan-   Received adequate sepsis fluid resuscitation

## 2025-03-29 NOTE — ASSESSMENT & PLAN NOTE
Recent Labs     03/28/25  1548 03/29/25  0452   HGB 9.8* 9.8*   * 111*     Lab Results   Component Value Date    IRON 137 08/19/2020    IRON 137 08/19/2020    FERRITIN 316 (H) 08/19/2020    TIBC 293 08/19/2020    TIBC 293 08/19/2020    CONCFE 47 (H) 08/19/2020    CONCFE 47 (H) 08/19/2020   Hx of JERARDO on Fe supplementation   However last labs indicate ACD  EGD 4/24- No overt bleeding  Colonoscopy 2021- Negative    Baseline appears to be around 9-10    Plan-   Will hold Fe supplements in setting of infection   Will check folate/b12   Likely secondary to immunosuppression and plasma cell dyscrasia   Transfuse <7

## 2025-03-29 NOTE — ASSESSMENT & PLAN NOTE
Concern for Sepsis POA as evidenced by fever, tachycardia, bandemia, hypotension   Suspected source: Intra-abdominal     Lab Results   Component Value Date    WBC 5.11 03/28/2025    HGB 9.8 (L) 03/28/2025    HCT 29.9 (L) 03/28/2025     (H) 03/28/2025     03/28/2025     Recent Labs     03/28/25  1548   LACTICACID 0.8   Procal 3.32  UA- Negative  Covid/Flu Rapid Ag- Negative  CT CAP w/con- Mild proximal colitis and severe mid to distal colitis, cannot exclude pseudomembranous colitis especially involving the mid to distal colon, Distended appendix measuring up to 8 mm without surrounding inflammatory changes   Cxray- Atelectasis   Received Cefepime and Vancomycin in ED   Was treated by outpatient oncologist for possible UTI with IV Levaquin and Ceftriaxone     Plan-   Zosyn and Oral Vancomycin  Blood Cultures   Cdiff, Stool enteric panel, and Stool O&P  Covid/Flu/Rsv PCR   Will continue Levophed for now in attempts to wean   Trend WBC/Fever curve  Received adequate sepsis fluid resuscitation fluids of 3L   Will continue Isolyte IVF

## 2025-03-29 NOTE — ASSESSMENT & PLAN NOTE
Follows Dr. Ramos as outpatient  Lymphoma in remission   Was on Brukinsa for Waldenstrom Macroglobulinemia- stopped around 1 week ago due to diarrheal symptoms    Plan-   Will hold medication   Follow up outpatient oncology

## 2025-03-29 NOTE — ASSESSMENT & PLAN NOTE
Currently SpO2: 94 % on Nasal Cannula O2 Flow Rate (L/min): 2 L/min  At baseline, uses no O2 supplementation   Hx of tobacco abuse    Plan-   Will check Covid/flu/rsv PCR   Pulmonary toilet   OOB as tolerated   Incentive spirometry   Wean/Adjust O2 as able

## 2025-03-29 NOTE — ASSESSMENT & PLAN NOTE
BP on admission 79/56, map 56   Last BP in office 3/26- 90/57  Shock likely secondary sepsis  Received Solumedrol in ED   Last ECHO 2021- EF 50-55%, G1DD    Plan-   Will do bedside POCUS to assess volume status and cardiac function  Received adequate sepsis fluid resuscitation  Hold off on formal ECHO for now pending bedside POCUS   Will check TSH/Free T4  If unable to wean pressor will place A line

## 2025-03-29 NOTE — ASSESSMENT & PLAN NOTE
3 weeks of diarrhea yellow will wake her up around 4:00 am  Not related to Brukinsa as she has been on for a year and stopped after onset of diarrhea   Pt has been weaning herself off percocet for about 3 weeks possible etiology    Infx vs vs malabsorption complications for bariatric surgery, vs EtOH vs opioid withdrawal vs Hyperthyroidism    PLAN:  Continue infectious workup  Monitor for signs and symptoms of withdrawal  CIWA  Potential GI consult  Probable downgrade to med surg

## 2025-03-29 NOTE — ASSESSMENT & PLAN NOTE
Concern for Sepsis POA as evidenced by fever, tachycardia, bandemia, hypotension   Suspected source: Intra-abdominal     Lab Results   Component Value Date    WBC 4.19 (L) 03/29/2025    HGB 9.8 (L) 03/29/2025    HCT 29.5 (L) 03/29/2025     (H) 03/29/2025     03/29/2025     Recent Labs     03/28/25  1548   LACTICACID 0.8   Bands 10% on admission   Procal 3.32  UA- Negative  Covid/Flu Rapid Ag- Negative  CT CAP w/con- Mild proximal colitis and severe mid to distal colitis, cannot exclude pseudomembranous colitis especially involving the mid to distal colon, Distended appendix measuring up to 8 mm without surrounding inflammatory changes   Cxray- Atelectasis   Received Cefepime and Vancomycin in ED   Was treated by outpatient oncologist for possible UTI with IV Levaquin and Ceftriaxone     Plan-   Zosyn and Oral Vancomycin started 3/28  Blood Cultures x2 pending  Cdiff, Stool enteric panel, and Stool O&P pending  Covid/Flu/Rsv PCR Neg  Trend WBC/Fever curve  Received adequate sepsis fluid resuscitation fluids of 3L   Will continue Isolyte IVF   transfer to Med Surg w/o tele

## 2025-03-29 NOTE — ASSESSMENT & PLAN NOTE
Lab Results   Component Value Date    FRM2VYKYODXH 0.367 (L) 03/28/2025    FREET4 0.97 03/28/2025     TSH low however T4 wnl  Pt on levothyroxine 50mcg daily    Not likely cause of diarrhea   PLAN:  Continue levothyroxine   No

## 2025-03-29 NOTE — PROGRESS NOTES
Progress Note - Critical Care/ICU   Name: Anisa Long 70 y.o. female I MRN: [id number]  Unit/Bed#: ICU 15 I Date of Admission: 3/28/2025   Date of Service: 3/29/2025 I Hospital Day: 1      Critical Care Interval Transfer Note:    Brief Hospital Summary: No notes on file 70 y.o. with a PMH of non hodgkins lymphoma (Oncologyis Dr. 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 Patient endorses long history of profuse non bloody diarrhea for over 3 weeks, non productive cough that started are sore throat,  She notes that she recently had UTI sx and was treated by her outpatient oncologist with IV ABX with levaquin and ceftriaxone . She was fluid resuscitated in ED and was placed on levophed for over 5 hours unable to wean. Pt brought to ICU continued fluid resuscitation, IV Abx, and weaned off pressors.  Pt is currently stable for Medsurg no indication for tele for further workup.     Barriers to discharge:   R/O infectious cause of diarrhea  Maintained hydration      Consults: IP CONSULT TO CASE MANAGEMENT    Recommended to review admission imaging for incidental findings and document in discharge navigator: Chart reviewed, no known incidental findings noted at this time.      Discharge Plan: Anticipate discharge in 24-48 hrs to home.       Patient seen and evaluated by Critical Care today and deemed to be appropriate for transfer to Med Surg. Spoke to Robert from [facility] to accept transfer. Critical care can be contacted via SecureChat with any questions or concerns. Please use the Critical Care AP Role in Secure Chat for any provider inquires until the  patient is transferred out of the ICU or until tomorrow at 0600.

## 2025-03-29 NOTE — ASSESSMENT & PLAN NOTE
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    Has had episodes of functional diarrhea in the past treated with imodium diet.

## 2025-03-29 NOTE — ASSESSMENT & PLAN NOTE
Currently SpO2: (!) 72 % on Nasal Cannula O2 Flow Rate (L/min): 2 L/min  At baseline, uses no O2 supplementation   Hx of tobacco abuse    Plan-   Will check Covid/flu/rsv PCR   Pulmonary toilet   OOB as tolerated   Incentive spirometry   Wean/Adjust O2 as able

## 2025-03-29 NOTE — ASSESSMENT & PLAN NOTE
Patient reported history of Cdiff   Received Levaquin and Ceftraixone recently for 7 days in total   Has been having 8+ episodes of profuse watery diarrhea ongoing for over 3 weeks     Plan-   Will test for Cdiff   In the interim will also treat with oral Vancomycin 125mg QD  IVF avoid antidiarrheal st this time

## 2025-03-29 NOTE — ASSESSMENT & PLAN NOTE
3 weeks of diarrhea yellow will wake her up around 4:00 am  Not related to Brukinsa as she has been on for a year and stopped after onset of diarrhea   Pt has been weaning herself off percocet for about 3 weeks possible etiology    Infx vs vs malabsorption complications for bariatric surgery, vs EtOH vs opioid withdrawal vs Hyperthyroidism    PLAN:  Continue infectious workup  Monitor for signs and symptoms of withdrawal  CIWA  Potential GI consult  downgrade to med surg

## 2025-03-29 NOTE — H&P
H&P - Critical Care/ICU   Name: Anisa Long 70 y.o. female I MRN: [id number]  Unit/Bed#: ICU 15 I Date of Admission: 3/28/2025   Date of Service: 3/28/2025 I Hospital Day: 0       Assessment & Plan  Sepsis ([facility])  Concern for Sepsis POA as evidenced by fever, tachycardia, bandemia, hypotension   Suspected source: Intra-abdominal     Lab Results   Component Value Date    WBC 5.11 03/28/2025    HGB 9.8 (L) 03/28/2025    HCT 29.9 (L) 03/28/2025     (H) 03/28/2025     03/28/2025     Recent Labs     03/28/25  1548   LACTICACID 0.8   Procal 3.32  UA- Negative  Covid/Flu Rapid Ag- Negative  CT CAP w/con- Mild proximal colitis and severe mid to distal colitis, cannot exclude pseudomembranous colitis especially involving the mid to distal colon, Distended appendix measuring up to 8 mm without surrounding inflammatory changes   Cxray- Atelectasis   Received Cefepime and Vancomycin in ED   Was treated by outpatient oncologist for possible UTI with IV Levaquin and Ceftriaxone     Plan-   Zosyn and Oral Vancomycin  Blood Cultures   Cdiff, Stool enteric panel, and Stool O&P  Covid/Flu/Rsv PCR   Will continue Levophed for now in attempts to wean   Trend WBC/Fever curve  Received adequate sepsis fluid resuscitation fluids of 3L   Will continue Isolyte IVF   Shock ([facility])  BP on admission 79/56, map 56   Last BP in office 3/26- 90/57  Shock likely secondary sepsis  Received Solumedrol in ED   Last ECHO 2021- EF 50-55%, G1DD    Plan-   Will do bedside POCUS to assess volume status and cardiac function  Received adequate sepsis fluid resuscitation  Hold off on formal ECHO for now pending bedside POCUS   Will check TSH/Free T4  If unable to wean pressor will place A line   JN (acute kidney injury) ([facility])  Lab Results   Component Value Date    CREATININE 1.69 (H) 03/28/2025   Baseline appears to be around 1?   CT AP- Moderated distended bladder, no suspicious renal mass, hydronephrosis, nephrolithiasis, or  perinephric fluid collections bilaterally, Mildly prominent extrarenal pelvis noted bilaterally without interval change  UA- Negative casts, ketones, or proteinuria, negative for bacteruria  Likely in setting of diarrhea however worsening of waldenstrom possibly contributing   Received IV contrast in ED       Plan-   Continue IVF for now   Check VBG   Monitor BMP  Avoid hypoperfusion of the kidneys, minimize nephrotoxins  RAAS Blockers/Diuretics held: Holding all antihypertensives in setting of shock     History of Clostridium difficile infection  Patient reported history of Cdiff   Received Levaquin and Ceftraixone recently for 7 days in total   Has been having 8+ episodes of profuse watery diarrhea ongoing for over 3 weeks     Plan-   Will test for Cdiff   In the interim will also treat with oral Vancomycin 125mg QD  IVF avoid antidiarrheal st this time   Acute respiratory failure with hypoxia (HCC)  Currently SpO2: 94 % on Nasal Cannula O2 Flow Rate (L/min): 2 L/min  At baseline, uses no O2 supplementation   Hx of tobacco abuse    Plan-   Will check Covid/flu/rsv PCR   Pulmonary toilet   OOB as tolerated   Incentive spirometry   Wean/Adjust O2 as able    Anemia  Recent Labs     03/28/25  1548   HGB 9.8*   *     Lab Results   Component Value Date    IRON 137 08/19/2020    IRON 137 08/19/2020    FERRITIN 316 (H) 08/19/2020    TIBC 293 08/19/2020    TIBC 293 08/19/2020    CONCFE 47 (H) 08/19/2020    CONCFE 47 (H) 08/19/2020   Hx of JERARDO on Fe supplementation   However last labs indicate ACD  EGD 4/24- No overt bleeding  Colonoscopy 2021- Negative    Baseline appears to be around 9-10    Plan-   Will hold Fe supplements in setting of infection   Will check folate/b12   Likely secondary to immunosuppression and plasma cell dyscrasia   Transfuse <7     History of non-Hodgkin's lymphoma  Follows Dr. Ramos as outpatient  Lymphoma in remission   Was on Brukinsa for Waldenstrom Macroglobulinemia- stopped around 1  week ago due to diarrheal symptoms    Plan-   Will hold medication   Follow up outpatient oncology     Disposition: Critical care    History of Present Illness   Anisa Long is a 70 y.o. 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 Patient endorses long history of profuse diarrhea for over 3 weeks, non productive cough that started are sore throat,  She notes that she recently had UTI sx and was treated by her outpatient oncologist with IV ABX with levaquin and ceftriaxone . She was fluid resuscitated in ED and was placed on levophed for over 5 hours unable to wean. Critical care evaluation and acceptance due to shock state in an attempt to wean pressors.     History obtained from the patient.  Review of Systems: See HPI for Review of Systems    Historical Information   Past Medical History:  No date: Anemia  No date: Cancer (HCC)  No date: Colon polyp  No date: Degenerative joint disease  No date: Disease of thyroid gland      Comment:  underactive  No date: FHx: non-Hodgkin's lymphoma      Comment:  macroglobulin anemia  No date: GERD (gastroesophageal reflux disease)  No date: History of back surgery  2014: History of chemotherapy      Comment:  lymphoma - [id number]  No date: Lumbar disc disease  02/02/2024: Shingles Past Surgical History:  No date: ANKLE SURGERY      Comment:  2004,2005,2006  10/1996: BACK SURGERY  06/2012: BREAST BIOPSY; Left      Comment:  benign  08/18/2015: BUNIONECTOMY  03/1995: CARPAL TUNNEL RELEASE; Left  12/1999: CARPAL TUNNEL RELEASE; Right  No date: COLONOSCOPY  8/26/2016: CT EPIDURAL STEROID INJECTION (TERRELL LUMBAR)  No date: DISCECTOMY SPINE CERVICAL ANTERIOR W/ ARTHROPLASTY      Comment:  C3/C4  10/27/2017: FOOT GANGLION EXCISION;  Left  08/09/2011: FOOT SURGERY      Comment:  ankle/foot sx  2009: HEMORRHOID SURGERY  No date: JOINT REPLACEMENT  No date: KNEE CARTILAGE SURGERY; Right  No date: MOUTH SURGERY  05/14/2008: ROTATOR CUFF REPAIR; Left  03/31/2008: AIME-EN-Y PROCEDURE  06/24/2014: TOTAL SHOULDER REPLACEMENT  No date: UPPER GASTROINTESTINAL ENDOSCOPY   Current Outpatient Medications   Medication Instructions    acetaminophen (TYLENOL) 500 mg, Oral, Every 6 hours PRN    allopurinol (ZYLOPRIM) 100 mg, Daily    ascorbic acid (VITAMIN C) 100 mg, Daily    B Complex-Biotin-FA (B Complete) TABS Take by mouth    bepotastine besilate (BEPREVE) 1.5 % op soln Daily    Biotin 1000 MCG CHEW Chew    Brukinsa 80 MG CAPS     Cholecalciferol 5,000 Units, 2 times daily with meals    colestipol (COLESTID) 1 g, Oral, 4 times daily    Collagen-Vitamin C-Biotin (COLLAGEN PO) 2,500 mg    Collagen-Vitamin C-Biotin (COLLAGEN PO) 1,000 mg    cyclobenzaprine (FLEXERIL) 10 mg tablet 3 times daily    docusate sodium (COLACE) 100 mg, Every other day    Ferrous Sulfate Dried (FERROUS SULFATE CR PO) 65 mg, Every other day    fexofenadine (ALLEGRA ALLERGY) 60 mg, Daily    gabapentin (NEURONTIN) 300 mg capsule take 2 capsules by mouth three times a day    hydroCHLOROthiazide 12.5 mg, Every evening    Lactobacillus (ACIDOPHOLUS PO) Take by mouth    levothyroxine 50 mcg, Oral, Daily    linaCLOtide 145 mcg, Oral, Daily    lisinopril-hydrochlorothiazide (PRINZIDE,ZESTORETIC) 10-12.5 MG per tablet lisinopril 10 mg-hydrochlorothiazide 12.5 mg tablet    loperamide (IMODIUM A-D) 2 mg, 4 times daily PRN    melatonin 5 mg, Daily at bedtime    Multiple Vitamins-Minerals (ONE DAILY WOMENS 50 PLUS PO) One Daily Womens 50 Plus    Omega 3-6-9 Fatty Acids (OMEGA 3-6-9 COMPLEX) CAPS omega 3,6,9 combination no.7 92 mg (43 mg-22 [id number]) chew tablet    OXYCODONE HCL PO 10 mg, 3 times daily    Polyethylene Glycol 3350 (MIRALAX PO) As needed    Potassium (POTASSIMIN) 75 MG TABS Take  by mouth    sucralfate (CARAFATE) 1 g, Oral, 3 times daily before meals    urea (CARMOL) 20 % cream Topical, As needed    vitamin E (TOCOPHEROL) capsule 400 Units, Daily    Allergies   Allergen Reactions    Avelox [Moxifloxacin] Other (See Comments)     shock    Cefaclor Other (See Comments)    Ciprofloxacin     Nsaids Other (See Comments)     NSAIDs are contraindicated due to history of gastric bypass.    Other Reaction(s): Other (See Comments)      NSAIDs are contraindicated due to history of gastric bypass.    Sulfa Antibiotics GI Intolerance    Sulfamethoxazole-Trimethoprim Other (See Comments)    Talwin [Pentazocine] Dizziness    Vicodin [Hydrocodone-Acetaminophen] GI Intolerance    Ondansetron Rash      Social History     Tobacco Use    Smoking status: Former     Types: Cigarettes    Smokeless tobacco: Never   Vaping Use    Vaping status: Never Used   Substance Use Topics    Alcohol use: Yes     Comment: wine at night    Drug use: Yes     Types: Marijuana     Comment: Medical marijuana 03/01/2021    Family History   Problem Relation Age of Onset    Diabetes Mother     Hypertension Mother     Stroke Mother     Stroke Father     Leukemia Father     Thyroid disease Sister     Stroke Brother     Heart disease Brother           Objective :                   Vitals I/O      Most Recent Min/Max in 24hrs   Temp 98.3 °F (36.8 °C) Temp  Min: 98.3 °F (36.8 °C)  Max: 101.1 °F (38.4 °C)   Pulse 93 Pulse  Min: 88  Max: 118   Resp 20 Resp  Min: 16  Max: 20   BP 97/54 BP  Min: 68/39  Max: 136/66   O2 Sat 94 % SpO2  Min: 89 %  Max: 98 %      Intake/Output Summary (Last 24 hours) at 3/28/2025 9102  Last data filed at 3/28/2025 1845  Gross per 24 hour   Intake 2550 ml   Output --   Net 2550 ml       Diet Regular; Regular House    Invasive Monitoring   N/a        Physical Exam   Physical Exam  Vitals and nursing note reviewed.   Eyes:      Extraocular Movements: Extraocular movements intact.      Pupils: Pupils are equal, round,  and reactive to light.   Skin:     General: Skin is warm.   HENT:      Head: Normocephalic and atraumatic.      Mouth/Throat:      Mouth: Mucous membranes are dry.   Cardiovascular:      Rate and Rhythm: Normal rate and regular rhythm.      Heart sounds: Normal heart sounds.   Musculoskeletal:      Right lower le+ Edema present.      Left lower leg: No edema.   Abdominal:      Palpations: Abdomen is soft.   Constitutional:       General: She is not in acute distress.     Appearance: She is well-developed and well-nourished. She is not ill-appearing or toxic-appearing.   Pulmonary:      Effort: Pulmonary effort is normal. No accessory muscle usage, respiratory distress or accessory muscle usage.      Breath sounds: Normal breath sounds. No wheezing or rhonchi.   Chest:      Chest wall: No tenderness.   Psychiatric:         Mood and Affect:  mood and affect abnormal.        Speech: Speech is not no receptive aphasia or no expressive aphasia.   Neurological:      General: No focal deficit present.      Mental Status: She is alert and oriented to person, place and time. Mental status is at baseline.      Cranial Nerves: No dysarthria or facial asymmetry.      Sensory: Sensation is intact.      Motor: gross motor function is at baseline for patient. Strength full and intact in all extremities. No motor deficit.          Diagnostic Studies        Lab Results: I have reviewed the following results:     Medications:  Scheduled PRN   chlorhexidine, 15 mL, Q12H FLORENTINO  heparin (porcine), 5,000 Units, Q8H FLORENTINO  [START ON 3/29/2025] levothyroxine, 50 mcg, Early Morning  melatonin, 4.5 mg, HS  piperacillin-tazobactam, 4.5 g, Once   Followed by  [START ON 3/29/2025] piperacillin-tazobactam, 4.5 g, Q8H  [START ON 3/29/2025] vancomycin oral (capsules or solution), 125 mg, Q6H FLORENTINO      acetaminophen, 975 mg, Q8H PRN  oxyCODONE, 10 mg, TID PRN       Continuous    multi-electrolyte, 125 mL/hr  norepinephrine, 1-30 mcg/min, Last Rate:  2.5 mcg/min (03/28/25 2335)         Labs:   CBC    Recent Labs     03/28/25  1548   WBC 5.11   HGB 9.8*   HCT 29.9*      BANDSPCT 10*     BMP    Recent Labs     03/28/25  1548   SODIUM 133*   K 4.2   CL 99   CO2 26   AGAP 8   BUN 26*   CREATININE 1.69*   CALCIUM 8.7       Coags    Recent Labs     03/28/25  1548   INR 1.02   PTT 33        Additional Electrolytes  No recent results       Blood Gas    No recent results  No recent results LFTs  Recent Labs     03/28/25  1548   ALT 10   AST 16   ALKPHOS 53   ALB 3.2*   TBILI 0.85       Infectious  Recent Labs     03/28/25  1548   PROCALCITONI 3.32*     Glucose  Recent Labs     03/28/25  1548   GLUC 95

## 2025-03-29 NOTE — PLAN OF CARE
Problem: PAIN - ADULT  Goal: Verbalizes/displays adequate comfort level or baseline comfort level  Description: Interventions:- Encourage patient to monitor pain and request assistance- Assess pain using appropriate pain scale- Administer analgesics based on type and severity of pain and evaluate response- Implement non-pharmacological measures as appropriate and evaluate response- Consider cultural and social influences on pain and pain management- Notify physician/advanced practitioner if interventions unsuccessful or patient reports new pain  Outcome: Progressing     Problem: INFECTION - ADULT  Goal: Absence or prevention of progression during hospitalization  Description: INTERVENTIONS:- Assess and monitor for signs and symptoms of infection- Monitor lab/diagnostic results- Monitor all insertion sites, i.e. indwelling lines, tubes, and drains- Monitor endotracheal if appropriate and nasal secretions for changes in amount and color- Kirby appropriate cooling/warming therapies per order- Administer medications as ordered- Instruct and encourage patient and family to use good hand hygiene technique- Identify and instruct in appropriate isolation precautions for identified infection/condition  Outcome: Progressing  Goal: Absence of fever/infection during neutropenic period  Description: INTERVENTIONS:- Monitor WBC  Outcome: Progressing     Problem: SAFETY ADULT  Goal: Patient will remain free of falls  Description: INTERVENTIONS:- Educate patient/family on patient safety including physical limitations- Instruct patient to call for assistance with activity - Consult OT/PT to assist with strengthening/mobility - Keep Call bell within reach- Keep bed low and locked with side rails adjusted as appropriate- Keep care items and personal belongings within reach- Initiate and maintain comfort rounds- Make Fall Risk Sign visible to staff- Offer Toileting every day Hours, in advance of need- Initiate/Maintain bed alarm-  Obtain necessary fall risk management equipment- Apply yellow socks and bracelet for high fall risk patients- Consider moving patient to room near nurses station  Outcome: Progressing  Goal: Maintain or return to baseline ADL function  Description: INTERVENTIONS:-  Assess patient's ability to carry out ADLs; assess patient's baseline for ADL function and identify physical deficits which impact ability to perform ADLs (bathing, care of mouth/teeth, toileting, grooming, dressing, etc.)- Assess/evaluate cause of self-care deficits - Assess range of motion- Assess patient's mobility; develop plan if impaired- Assess patient's need for assistive devices and provide as appropriate- Encourage maximum independence but intervene and supervise when necessary- Involve family in performance of ADLs- Assess for home care needs following discharge - Consider OT consult to assist with ADL evaluation and planning for discharge- Provide patient education as appropriate  Outcome: Progressing  Goal: Maintains/Returns to pre admission functional level  Description: INTERVENTIONS:- Perform AM-PAC 6 Click Basic Mobility/ Daily Activity assessment daily.- Set and communicate daily mobility goal to care team and patient/family/caregiver. - Collaborate with rehabilitation services on mobility goals if consulted- Perform Range of Motion 3 times a day.- Reposition patient every 2 hours.- Dangle patient 3 times a day- Stand patient 3 times a day- Ambulate patient 3 times a day- Out of bed to chair 3 times a day - Out of bed for meals 3 times a day- Out of bed for toileting- Record patient progress and toleration of activity level   Outcome: Progressing     Problem: DISCHARGE PLANNING  Goal: Discharge to home or other facility with appropriate resources  Description: INTERVENTIONS:- Identify barriers to discharge w/patient and caregiver- Arrange for needed discharge resources and transportation as appropriate- Identify discharge learning needs  (meds, wound care, etc.)- Arrange for interpretive services to assist at discharge as needed- Refer to Case Management Department for coordinating discharge planning if the patient needs post-hospital services based on physician/advanced practitioner order or complex needs related to functional status, cognitive ability, or social support system  Outcome: Progressing     Problem: Knowledge Deficit  Goal: Patient/family/caregiver demonstrates understanding of disease process, treatment plan, medications, and discharge instructions  Description: Complete learning assessment and assess knowledge base.Interventions:- Provide teaching at level of understanding- Provide teaching via preferred learning methods  Outcome: Progressing

## 2025-03-29 NOTE — ASSESSMENT & PLAN NOTE
Lab Results   Component Value Date    CREATININE 0.88 03/29/2025   Baseline appears to be around 1?   CT AP- Moderated distended bladder, no suspicious renal mass, hydronephrosis, nephrolithiasis, or perinephric fluid collections bilaterally, Mildly prominent extrarenal pelvis noted bilaterally without interval change  UA- Negative casts, ketones, or proteinuria, negative for bacteruria  Likely in setting of diarrhea however worsening of waldenstrom possibly contributing   Received IV contrast in ED       Plan-   Continue IVF for now   Check VBG   Monitor BMP  Avoid hypoperfusion of the kidneys, minimize nephrotoxins  RAAS Blockers/Diuretics held: Holding all antihypertensives in setting of shock

## 2025-03-29 NOTE — ASSESSMENT & PLAN NOTE
Lab Results   Component Value Date    CREATININE 1.69 (H) 03/28/2025   Baseline appears to be around 1?   CT AP- Moderated distended bladder, no suspicious renal mass, hydronephrosis, nephrolithiasis, or perinephric fluid collections bilaterally, Mildly prominent extrarenal pelvis noted bilaterally without interval change  UA- Negative casts, ketones, or proteinuria, negative for bacteruria  Likely in setting of diarrhea however worsening of waldenstrom possibly contributing   Received IV contrast in ED       Plan-   Continue IVF for now   Check VBG   Monitor BMP  Avoid hypoperfusion of the kidneys, minimize nephrotoxins  RAAS Blockers/Diuretics held: Holding all antihypertensives in setting of shock

## 2025-03-29 NOTE — PROCEDURES
POC Cardiac US    Date/Time: 3/29/2025 2:34 AM    Performed by: Ericka Galdamez MD  Authorized by: Ericka Galdamez MD    Patient location:  ICU  Procedure details:     Exam Type:  Diagnostic    Indications: hypotension and suspected volume depletion      Assessment / Evaluation for: cardiac function, pericardial effusion, inferior vena cava for fluid responsiveness and intravascular volume status      Exam Type: initial exam      Image quality: limited diagnostic    Patient Details:     Cardiac Rhythm:  Regular    Medications: norepinephrine infusion      Mechanical ventilation: No    Cardiac findings:     Echo technique: limited 2D      Views obtained: parasternal long axis, parasternal short axis, subcostal and apical      Pericardial effusion: absent      Tamponade physiology: absent      Wall motion: normal      LV systolic function: normal    IVC findings:     IVC Size: small    Interpretation:     Fluid Status:  Hypovolemic

## 2025-03-29 NOTE — ASSESSMENT & PLAN NOTE
Lab Results   Component Value Date    [id number] 0.367 (L) 03/28/2025    FREET4 0.97 03/28/2025     TSH low however T4 wnl  Pt on levothyroxine 50mcg daily    Not likely cause of diarrhea   PLAN:  Continue levothyroxine

## 2025-03-29 NOTE — PROGRESS NOTES
Progress Note - Critical Care/ICU   Name: Anisa Long 70 y.o. female I MRN: [id number]  Unit/Bed#: ICU 15 I Date of Admission: 3/28/2025   Date of Service: 3/29/2025 I Hospital Day: 1      Assessment & Plan  Shock (HCC) (Resolved: 3/29/2025)  BP on admission 79/56, map 56   Last BP in office 3/26- 90/57  Shock likely secondary sepsis  Received Solumedrol in ED   Last ECHO 2021- EF 50-55%, G1DD    Plan-   Will do bedside POCUS to assess volume status and cardiac function  Received adequate sepsis fluid resuscitation  Hold off on formal ECHO for now pending bedside POCUS   Will check TSH/Free T4  If unable to wean pressor will place A line   Sepsis (HCC)  Concern for Sepsis POA as evidenced by fever, tachycardia, bandemia, hypotension   Suspected source: Intra-abdominal     Lab Results   Component Value Date    WBC 4.19 (L) 03/29/2025    HGB 9.8 (L) 03/29/2025    HCT 29.5 (L) 03/29/2025     (H) 03/29/2025     03/29/2025     Recent Labs     03/28/25  1548   LACTICACID 0.8   Bands 10% on admission   Procal 3.32  UA- Negative  Covid/Flu Rapid Ag- Negative  CT CAP w/con- Mild proximal colitis and severe mid to distal colitis, cannot exclude pseudomembranous colitis especially involving the mid to distal colon, Distended appendix measuring up to 8 mm without surrounding inflammatory changes   Cxray- Atelectasis   Received Cefepime and Vancomycin in ED   Was treated by outpatient oncologist for possible UTI with IV Levaquin and Ceftriaxone     Plan-   Zosyn and Oral Vancomycin  Blood Cultures   Cdiff, Stool enteric panel, and Stool O&P  Covid/Flu/Rsv PCR   Trend WBC/Fever curve  Received adequate sepsis fluid resuscitation fluids of 3L   Will continue Isolyte IVF   Off fluids most likely transfer to Med Surg w/o tele this afternoon   JN (acute kidney injury) (HCC) (Resolved: 3/29/2025)  Lab Results   Component Value Date    CREATININE 0.88 03/29/2025   Baseline appears to be around 1?   CT AP-  Moderated distended bladder, no suspicious renal mass, hydronephrosis, nephrolithiasis, or perinephric fluid collections bilaterally, Mildly prominent extrarenal pelvis noted bilaterally without interval change  UA- Negative casts, ketones, or proteinuria, negative for bacteruria  Likely in setting of diarrhea however worsening of waldenstrom possibly contributing   Received IV contrast in ED       Plan-   Continue IVF for now   Check VBG   Monitor BMP  Avoid hypoperfusion of the kidneys, minimize nephrotoxins  RAAS Blockers/Diuretics held: Holding all antihypertensives in setting of shock     Chronic diarrhea  3 weeks of diarrhea yellow will wake her up around 4:00 am  Not related to Brukinsa as she has been on for a year and stopped after onset of diarrhea   Pt has been weaning herself off percocet for about 3 weeks possible etiology    Infx vs vs malabsorption complications for bariatric surgery, vs EtOH vs opioid withdrawal vs Hyperthyroidism    PLAN:  Continue infectious workup  Monitor for signs and symptoms of withdrawal  CIWA  Potential GI consult  Probable downgrade to med surg   History of Clostridium difficile infection  Patient reported history of Cdiff   Received Levaquin and Ceftraixone recently for 7 days in total   Has been having 8+ episodes of profuse watery diarrhea ongoing for over 3 weeks     Plan-   Will test for Cdiff   In the interim will also treat with oral Vancomycin 125mg QD  IVF avoid antidiarrheal st this time   Acute respiratory failure with hypoxia (HCC) (Resolved: 3/29/2025)  Currently SpO2: 95 % on Nasal Cannula O2 Flow Rate (L/min): 2 L/min  At baseline, uses no O2 supplementation   Hx of tobacco abuse    Plan-   Will check Covid/flu/rsv PCR   Pulmonary toilet   OOB as tolerated   Incentive spirometry   Wean/Adjust O2 as able    Anemia  Recent Labs     03/28/25  1548 03/29/25  0452   HGB 9.8* 9.8*   * 111*     Lab Results   Component Value Date    IRON 137 08/19/2020    IRON  137 08/19/2020    FERRITIN 316 (H) 08/19/2020    TIBC 293 08/19/2020    TIBC 293 08/19/2020    CONCFE 47 (H) 08/19/2020    CONCFE 47 (H) 08/19/2020   Hx of JERARDO on Fe supplementation   However last labs indicate ACD  EGD 4/24- No overt bleeding  Colonoscopy 2021- Negative    Baseline appears to be around 9-10    Plan-   Will hold Fe supplements in setting of infection   Will check folate/b12   Likely secondary to immunosuppression and plasma cell dyscrasia   Transfuse <7     History of non-Hodgkin's lymphoma  Follows Dr. Ramos as outpatient  Lymphoma in remission   Was on Brukinsa for Waldenstrom Macroglobulinemia- stopped around 1 week ago due to diarrheal symptoms    Plan-   Will hold medication   Follow up outpatient oncology     Hypothyroid  Lab Results   Component Value Date    ZMF4XGGFYDUJ 0.367 (L) 03/28/2025    FREET4 0.97 03/28/2025     TSH low however T4 wnl  Pt on levothyroxine 50mcg daily    Not likely cause of diarrhea   PLAN:  Continue levothyroxine  Postoperative malabsorption    Essential hypertension  Currently holding home antihypertensives in setting of hypotension and JN  S/P bariatric surgery    Acute alcoholic intoxication in alcoholism (HCC)  Hx of alcohol abuse  Drinks 3 glasses a wine per day    PLAN:  CIWA for 72 hrs  Chronically on opiate therapy  Has been tapering off opioids for 3 weeks  Disposition: Stepdown Level 1    ICU Core Measures     A: Assess, Prevent, and Manage Pain Has pain been assessed? Yes  Need for changes to pain regimen? No   B: Both SAT/SAT  N/A   C: Choice of Sedation RASS Goal: 0 Alert and Calm or N/A patient not on sedation  Need for changes to sedation or analgesia regimen? NA   D: Delirium CAM-ICU: Negative   E: Early Mobility  Plan for early mobility? Yes   F: Family Engagement Plan for family engagement today? Yes       Antibiotic Review: Patient on appropriate coverage based on culture data.       Prophylaxis:  VTE VTE covered by:  heparin (porcine),  Subcutaneous, 5,000 Units at 03/29/25 0501       Stress Ulcer  not ordered         24 Hour Events : No events overnight patient continues to have diarrhea. Pt noted diarrhea started after trip to [place] (the state not the country). Pt also notes she has been tapering herself off percocet (did not see in PDMP). Pt aslo endorses 3 glasses of wine a day drinker.   Subjective   Review of Systems: Review of Systems   Constitutional:  Positive for appetite change. Negative for activity change and unexpected weight change.   Respiratory:  Negative for apnea, cough, shortness of breath, wheezing and stridor.    Cardiovascular:  Negative for chest pain and palpitations.   Gastrointestinal:  Positive for diarrhea. Negative for abdominal pain, blood in stool, constipation, nausea and vomiting.       Objective :                   Vitals I/O      Most Recent Min/Max in 24hrs   Temp 98 °F (36.7 °C) Temp  Min: 97.9 °F (36.6 °C)  Max: 101.1 °F (38.4 °C)   Pulse 96 Pulse  Min: 78  Max: 118   Resp 18 Resp  Min: 16  Max: 41   /67 BP  Min: 68/39  Max: 136/66   O2 Sat 95 % SpO2  Min: 89 %  Max: 98 %      Intake/Output Summary (Last 24 hours) at 3/29/2025 0739  Last data filed at 3/29/2025 0501  Gross per 24 hour   Intake 3632.67 ml   Output 1411 ml   Net 2221.67 ml       Diet Regular; Regular House    Invasive Monitoring           Physical Exam   Physical Exam  Vitals and nursing note reviewed.   Eyes:      General: No scleral icterus.     Conjunctiva/sclera: Conjunctivae normal.   Skin:     Coloration: Skin is not jaundiced.   HENT:      Head: Normocephalic.      Mouth/Throat:      Mouth: Mucous membranes are moist.      Pharynx: No oropharyngeal exudate.   Cardiovascular:      Rate and Rhythm: Normal rate and regular rhythm.   Musculoskeletal:         General: No swelling.      Right lower leg: No edema.      Left lower leg: No edema.   Abdominal: General: There is no distension.      Palpations: Abdomen is soft.       Tenderness: There is no abdominal tenderness. There is no guarding.   Constitutional:       General: She is not in acute distress.     Appearance: She is not ill-appearing or toxic-appearing.      Interventions: She is not sedated, not intubated and not restrained.  Pulmonary:      Effort: Pulmonary effort is normal. No accessory muscle usage, respiratory distress or accessory muscle usage. She is not intubated.      Breath sounds: Normal breath sounds.   Psychiatric:         Mood and Affect: Mood and affect normal.   Neurological:      General: No focal deficit present.      Mental Status: She is alert and oriented to person, place and time.      Motor: gross motor function is at baseline for patient.          Diagnostic Studies        Lab Results: I have reviewed the following results:     Medications:  Scheduled PRN   chlorhexidine, 15 mL, Q12H FLORENTION  gabapentin, 800 mg, TID  heparin (porcine), 5,000 Units, Q8H FLORENTINO  levothyroxine, 50 mcg, Early Morning  magnesium sulfate, 2 g, Once  melatonin, 4.5 mg, HS  piperacillin-tazobactam, 4.5 g, Q8H  vancomycin oral (capsules or solution), 125 mg, Q6H FLORENTINO      acetaminophen, 975 mg, Q8H PRN  oxyCODONE, 10 mg, TID PRN       Continuous    multi-electrolyte, 125 mL/hr, Last Rate: 125 mL/hr (03/29/25 0711)  norepinephrine, 1-30 mcg/min, Last Rate: Stopped (03/29/25 0356)         Labs:   CBC    Recent Labs     03/28/25  1548 03/29/25  0452   WBC 5.11 4.19*   HGB 9.8* 9.8*   HCT 29.9* 29.5*    185   BANDSPCT 10*  --      BMP    Recent Labs     03/28/25  1548 03/29/25  0452   SODIUM 133* 138   K 4.2 4.0   CL 99 105   CO2 26 23   AGAP 8 10   BUN 26* 21   CREATININE 1.69* 0.88   CALCIUM 8.7 8.1*       Coags    Recent Labs     03/28/25  1548   INR 1.02   PTT 33        Additional Electrolytes  Recent Labs     03/29/25  0452   MG 1.8*   CAIONIZED 1.13          Blood Gas    No recent results  Recent Labs     03/29/25  0024   PHVEN 7.370   HGH0OWO 37.9*   PO2VEN 42.1   MUL6QYE  21.4*   BEVEN -3.5   R1VGHOI 73.9    LFTs  Recent Labs     03/28/25  1548   ALT 10   AST 16   ALKPHOS 53   ALB 3.2*   TBILI 0.85       Infectious  Recent Labs     03/28/25  1548   PROCALCITONI 3.32*     Glucose  Recent Labs     03/28/25  1548 03/29/25  0452   GLUC 95 143*

## 2025-03-29 NOTE — UTILIZATION REVIEW
Initial Clinical Review    Admission: Date/Time/Statement:   Admission Orders (From admission, onward)       Ordered        03/28/25 2244  INPATIENT ADMISSION  Once                          Orders Placed This Encounter   Procedures    INPATIENT ADMISSION     Standing Status:   Standing     Number of Occurrences:   1     Level of Care:   Critical Care [15]     Estimated length of stay:   More than 2 Midnights     Certification:   I certify that inpatient services are medically necessary for this patient for a duration of greater than two midnights. See H&P and MD Progress Notes for additional information about the patient's course of treatment.     ED Arrival Information       Expected   -    Arrival   3/28/2025 15:24    Acuity   Emergent              Means of arrival   Ambulance    Escorted by   [facility]    Service   Hospitalist    Admission type   Emergency              Arrival complaint   [facility]             Chief Complaint   Patient presents with    Fever     Pt was at pcp.  Provider was concerned for sepsis.  Pt had a fever of 104 at was hypotensive for provider but not ems       Initial Presentation: 70 y.o. female  to ED, per PCP,  via EMS from home.    Admitted to inpatient with Dx: Sepsis/Shock/JN/acute respiratory failure with hypoxia .  Presented to ED with hypotension and fever at PCP office to 104 on day of arrival.  Has cough, feels like has UTI and both started about 1 week prior to arrival.  Chest pain with cough.  Recent diarrhea, 8 episodes daily over lat 3 weeksstates history of C diff.      treated for non-Hodgkin's lymphoma, last chemotherapy IV treatment was on 3/24/25 . 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   On exam:  tachycardia.  Lungs rales. Hypotensive.   UA trace leuko.  Procal 3.32.  wbc 5.11.  H&H 9.8/29.9.   bun 26.   Creatinine 1.69 with baseline about 1.      Imaging shows: .No  acute intrathoracic abnormalities.   Found with Colitis on ct abdomen.    ED treatment:  IVF bolus of 1 liter x 3.  Given Cefepime and vancomycin.  Remained hypotensive and started on Levophed.   Placed on oxygen 2 liters.    Plan includes  to admit to ICU.  Start Zosyn and oral vancomycin.  Follow cultures.   Stool Cdiff, Stool enteric panel, and Stool O&P.   Continue Levophed and wean as able.  Continue IVF.  IF unable to wean Pressors, arterial line.    Trend BMP.      Date: 3/29/25   Day 2:  off pressors.  + diarrhea.  Appetite poor.  On exam appears non toxic.   Stool C diff positive.  Wbc 4.19.  H&H 9.8/29.5.  bun 21. Creatinine 0.88.   Dx:Hypotension likely hypovolemic shock/macroglobulinemia/immunocompromise state/chronic alcohol and tobacco use/bariatric surgery/recurrent C. difficile/chronic diarrhea/non-Hodgkin's lymphoma   Plan: continue Zosyn, oral vancomycin and IVF.   Follow cultures.     Patient has crossed 3 midnights and requires ongoing care    3/30/2025 .  Patient presents with some head pain, rates 5/10.  Stools unable to measure - x 2 since yesterday.   On exam:  alert and oriented.  Incentive spirometry 750 ml.    Abnormal labs or imaging:  enteric panel negative.  Procal 1.82. Mg 1.8.  wbc 2.89.  H&H 10.7/31.9.    Diagnosis/Plan     Hypotension likely hypovolemic shock/macroglobulinemia/immunocompromise state/chronic alcohol and tobacco use/bariatric surgery/recurrent C. difficile/chronic diarrhea/non-Hodgkin's lymphoma.  Continue IVF and oral vancomycin.      ED Treatment-Medication Administration from 03/28/2025 1524 to 03/28/2025 2344         Date/Time Order Dose Route Action     03/28/2025 1547 sodium chloride 0.9 % bolus 1,000 mL 1,000 mL Intravenous New Bag     03/28/2025 1628 sodium chloride 0.9 % bolus 1,000 mL 1,000 mL Intravenous New Bag     03/28/2025 1612 cefepime (MAXIPIME) 2 g/50 mL dextrose IVPB 2,000 mg Intravenous New Bag     03/28/2025 1645 vancomycin (VANCOCIN) 1,750 mg in  sodium chloride 0.9 % 500 mL IVPB 1,750 mg Intravenous New Bag     03/28/2025 1629 acetaminophen (TYLENOL) tablet 975 mg 975 mg Oral Given     03/28/2025 1615 NOREPINEPHRINE 4 MG  ML NSS (CMPD ORDER) infusion 5 mcg/min Intravenous New Bag     03/28/2025 1626 NOREPINEPHRINE 4 MG  ML NSS (CMPD ORDER) infusion 6 mcg/min Intravenous Rate/Dose Change     03/28/2025 1711 NOREPINEPHRINE 4 MG  ML NSS (CMPD ORDER) infusion 7 mcg/min Intravenous Rate/Dose Change     03/28/2025 1734 NOREPINEPHRINE 4 MG  ML NSS (CMPD ORDER) infusion 8 mcg/min Intravenous Rate/Dose Change     03/28/2025 1747 NOREPINEPHRINE 4 MG  ML NSS (CMPD ORDER) infusion 9 mcg/min Intravenous Rate/Dose Change     03/28/2025 1948 NOREPINEPHRINE 4 MG  ML NSS (CMPD ORDER) infusion 8 mcg/min Intravenous Rate/Dose Change     03/28/2025 2008 NOREPINEPHRINE 4 MG  ML NSS (CMPD ORDER) infusion 6 mcg/min Intravenous Rate/Dose Change     03/28/2025 2129 NOREPINEPHRINE 4 MG  ML NSS (CMPD ORDER) infusion 5 mcg/min Intravenous Restarted     03/28/2025 2206 NOREPINEPHRINE 4 MG  ML NSS (CMPD ORDER) infusion 2.5 mcg/min Intravenous Rate/Dose Change     03/28/2025 2335 NOREPINEPHRINE 4 MG  ML NSS (CMPD ORDER) infusion 2.5 mcg/min Intravenous Restarted     03/28/2025 1655 gabapentin (NEURONTIN) capsule 600 mg 600 mg Oral Given     03/28/2025 1730 sodium chloride 0.9 % bolus 1,000 mL 1,000 mL Intravenous New Bag     03/28/2025 1811 methylPREDNISolone sodium succinate (Solu-MEDROL) injection 125 mg 125 mg Intravenous Given     03/28/2025 1940 iohexol (OMNIPAQUE) 350 MG/ML injection (MULTI-DOSE) 100 mL 100 mL Intravenous Given     03/28/2025 2208 sodium chloride 0.9 % infusion 125 mL/hr Intravenous New Bag            Scheduled Medications:  chlorhexidine, 15 mL, Mouth/Throat, Q12H FLORENTINO  gabapentin, 800 mg, Oral, TID  heparin (porcine), 5,000 Units, Subcutaneous, Q8H FLORENTINO  levothyroxine, 50 mcg, Oral, Early  Morning  melatonin, 4.5 mg, Oral, HS  piperacillin-tazobactam, 4.5 g, Intravenous, Q8H  vancomycin oral (capsules or solution), 125 mg, Oral, Q6H FLORENTINO    magnesium sulfate 2 g/50 mL IVPB (premix) 2 g  Dose: 2 g  Freq: Once Route: IV  Last Dose: Stopped (03/29/25 1322)  Start: 03/29/25 0630 End: 03/29/25 1322    Continuous IV Infusions:  multi-electrolyte, 125 mL/hr, Intravenous, Continuous  norepinephrine, 1-30 mcg/min, Intravenous, Titrated - stopped 0356 on 3/29    sodium chloride 0.9 % infusion  Rate: 125 mL/hr Dose: 125 mL/hr  Freq: Continuous Route: IV  Indications of Use: IV Hydration  Last Dose: Stopped (03/28/25 2345)  Start: 03/28/25 2130 End: 03/28/25 2317    PRN Meds: not used.   acetaminophen, 975 mg, Oral, Q8H PRN  dronabinol, 2.5 mg, Oral, BID PRN  oxyCODONE, 10 mg, Oral, TID PRN      ED Triage Vitals   Temperature Pulse Respirations Blood Pressure SpO2 Pain Score   03/28/25 1533 03/28/25 1531 03/28/25 1531 03/28/25 1531 03/28/25 1531 03/28/25 1531   (!) 101.1 °F (38.4 °C) (!) 111 16 (!) 79/46 95 % No Pain     Weight (last 2 days)       Date/Time Weight    03/30/25 0546 93.1 (205.25)    03/28/25 2346 98 (216.05)    03/28/25 1531 88.2 (194.45)            Vital Signs (last 3 days)       Date/Time Temp Pulse Resp BP MAP (mmHg) SpO2 Calculated FIO2 (%) - Nasal Cannula Nasal Cannula O2 Flow Rate (L/min) O2 Device Patient Position - Orthostatic VS Lata Coma Scale Score Pain    03/30/25 0819 -- 91 26 90/55 67 -- -- -- -- -- -- --    03/30/25 0800 -- -- -- -- -- -- -- -- -- -- 15 --    03/30/25 0700 98 °F (36.7 °C) 89 17 90/51 64 94 % -- -- None (Room air) Lying -- --    03/30/25 0600 98.1 °F (36.7 °C) 90 23 94/62 72 -- -- -- -- -- -- --    03/30/25 0546 -- -- -- -- -- -- -- -- -- -- -- 5    03/30/25 0326 98.9 °F (37.2 °C) 88 18 108/76 98 -- -- -- -- -- -- --    03/29/25 2359 98.2 °F (36.8 °C) 81 17 111/68 89 -- -- -- -- -- -- --    03/29/25 2122 98 °F (36.7 °C) 93 15 102/67 81 -- -- -- None (Room air)  Sitting -- --    03/29/25 2000 -- -- -- -- -- -- -- -- -- -- 15 No Pain    03/29/25 1900 98.2 °F (36.8 °C) -- -- 92/56 68 -- -- -- -- Sitting -- --    03/29/25 1500 98.2 °F (36.8 °C) 93 20 106/84 91 94 % -- -- None (Room air) Sitting -- --    03/29/25 1300 -- 82 26 103/60 75 94 % -- -- -- -- -- --    03/29/25 1200 -- 85 22 98/74 82 95 % -- -- -- -- 15 --    03/29/25 1133 -- -- -- -- -- 94 % -- -- -- -- -- --    03/29/25 1100 -- 91 22 108/60 80 72 % -- -- -- -- -- --    03/29/25 1056 98.1 °F (36.7 °C) 88 22 97/62 74 90 % -- -- None (Room air) Lying -- --    03/29/25 0800 -- 79 25 101/61 76 94 % -- -- -- -- -- --    03/29/25 0707 98 °F (36.7 °C) 96 18 104/67 80 95 % -- -- None (Room air) -- -- --    03/29/25 0700 -- -- -- -- -- -- -- -- -- -- 15 No Pain    03/29/25 0612 -- 78 18 107/61 76 91 % -- -- -- -- -- --    03/29/25 0500 -- 91 41 104/76 85 89 % -- -- -- -- -- --    03/29/25 0445 97.9 °F (36.6 °C) 85 37 104/68 82 92 % -- -- None (Room air) -- -- --    03/29/25 0400 -- 80 19 100/59 74 93 % -- -- -- -- 15 No Pain    03/29/25 0356 -- 80 19 107/65 80 94 % -- -- -- -- -- --    03/29/25 0327 -- 80 25 100/61 76 96 % 28 2 L/min Nasal cannula -- -- --    03/29/25 0300 -- 82 26 96/60 72 93 % -- -- -- -- -- --    03/29/25 0245 -- 82 25 99/60 75 -- -- -- -- -- -- --    03/29/25 0230 -- 82 24 110/62 80 93 % -- -- -- -- -- --    03/29/25 0221 -- 84 -- 122/78 94 -- -- -- -- -- -- --    03/29/25 0000 -- -- -- -- -- -- -- -- -- -- 15 --    03/28/25 2347 -- -- -- -- -- -- -- -- -- -- -- No Pain    03/28/25 2346 98.3 °F (36.8 °C) 93 20 97/54 -- -- -- -- -- -- -- --    03/28/25 2335 -- -- -- -- 63 -- -- -- -- -- -- --    03/28/25 2315 -- 100 -- 105/56 73 94 % -- -- -- -- -- --    03/28/25 2301 -- -- -- -- -- 93 % -- -- -- -- -- --    03/28/25 2241 -- -- -- 91/61 71 -- -- -- -- -- -- --    03/28/25 2240 -- 94 -- 98/61 73 93 % -- -- -- -- -- --    03/28/25 2237 -- 97 -- 97/61 75 94 % -- -- -- -- -- --    03/28/25 2230 -- 88 -- 116/73  90 94 % -- -- -- -- -- --    03/28/25 2227 -- 91 -- 107/58 77 93 % -- -- -- -- -- --    03/28/25 2200 -- 90 16 121/65 87 96 % 28 2 L/min Nasal cannula Lying -- --    03/28/25 2153 -- 90 18 121/65 -- 95 % 28 2 L/min Nasal cannula Lying -- --    03/28/25 2121 -- 91 -- 90/51 65 94 % -- -- -- -- -- --    03/28/25 2111 -- 97 -- 83/52 62 93 % -- -- -- -- -- --    03/28/25 2051 -- 98 -- 106/59 70 93 % -- -- -- -- -- --    03/28/25 2040 -- 98 -- 110/53 76 92 % -- -- -- -- -- --    03/28/25 2031 -- 106 -- 120/56 81 90 % -- -- -- -- -- --    03/28/25 2030 -- 110 -- -- -- 89 % -- -- -- -- -- --    03/28/25 2022 -- -- -- 136/66 94 -- -- -- -- -- -- --    03/28/25 2020 -- 100 -- 136/66 94 90 % -- -- -- -- -- --    03/28/25 2008 -- 101 -- 133/65 93 -- -- -- -- -- -- --    03/28/25 2000 -- 102 -- 133/65 93 90 % -- -- -- -- -- --    03/28/25 1948 -- 100 -- 128/67 90 -- -- -- -- -- -- --    03/28/25 1940 -- 101 -- 120/57 82 92 % -- -- -- -- -- --    03/28/25 1924 -- 104 -- 105/61 77 96 % -- -- -- -- -- --    03/28/25 1904 -- 109 -- 108/73 82 97 % -- -- -- -- -- --    03/28/25 1849 -- 109 -- 109/73 86 -- -- -- -- -- -- --    03/28/25 1848 -- -- -- -- -- 94 % -- -- -- -- -- --    03/28/25 1833 -- 109 -- 117/66 89 -- -- -- -- -- -- --    03/28/25 1820 -- 107 -- 114/69 87 97 % -- -- -- -- -- --    03/28/25 1815 98.4 °F (36.9 °C) -- -- -- -- -- -- -- -- -- -- --    03/28/25 1805 -- 105 -- 116/65 80 94 % -- -- -- -- -- --    03/28/25 1804 -- -- -- -- -- -- -- -- -- -- -- 4    03/28/25 1802 -- 108 -- 117/53 76 92 % -- -- -- -- -- --    03/28/25 1747 -- 107 -- 83/53 59 -- -- -- -- -- -- --    03/28/25 1734 -- 110 -- 80/58 63 -- -- -- -- -- -- --    03/28/25 1725 -- 109 -- 86/51 63 98 % -- -- -- -- -- --    03/28/25 1720 -- 110 -- 90/54 66 97 % -- -- -- -- -- --    03/28/25 1647 -- -- -- -- -- -- -- -- None (Room air) -- -- --    03/28/25 1639 99.7 °F (37.6 °C) -- -- -- -- -- -- -- -- -- -- --    03/28/25 1635 -- 114 -- 102/63 76 96 % -- --  -- Sitting -- --    03/28/25 1626 -- 113 -- 85/53 62 -- -- -- None (Room air) -- -- --    03/28/25 1620 -- 116 -- 85/53 62 97 % -- -- -- -- -- --    03/28/25 1619 -- 116 -- 87/53 65 96 % -- -- -- -- -- --    03/28/25 1615 -- 118 18 68/39 47 95 % -- -- None (Room air) Sitting -- --    03/28/25 1611 -- -- -- 68/39 47 -- -- -- -- -- -- --    03/28/25 1533 101.1 °F (38.4 °C) -- -- -- -- -- -- -- -- -- -- --    03/28/25 1531 -- 111 16 79/46 56 95 % -- -- None (Room air) Lying -- No Pain              Pertinent Labs/Diagnostic Test Results:   Radiology:  CT chest abdomen pelvis w contrast   Final Interpretation by Marcio Sharif MD (03/28 2147)      1.  No acute intrathoracic abnormalities.   2.  No thoracic aortic aneurysm or dissection. No acute central/saddle pulmonary embolism. Evaluation of more peripheral pulmonary arterial branches is limited on this study due to timing of contrast bolus/technique.   3.  Bibasilar dependent atelectasis without lobar airspace consolidation/pneumonia or pleural effusions.   4.  Findings consistent with mild proximal colitis and severe mid to distal colitis as detailed above likely due to infectious/inflammatory etiologies. Can not exclude pseudomembranous colitis especially involving the mid to distal colon. Clinical    correlation recommended. No pericolonic free air or abscess. Distended appendix measuring up to 8 mm without surrounding inflammatory changes. Findings may reflect reactive changes of the appendix given diffuse colitis.   5.  Trace lower pelvic free fluid. No loculated fluid collection/abscess. No evidence of bowel obstruction or free air.   6.  Additional ancillary findings detailed above.               Workstation performed: [id number]         XR chest portable   ED Interpretation by Agata Ricci MD (03/28 3001)   No acute cardiac, pulmonary, or osseous processes noted.  Some increased opacity on the left side, possibly underinflation versus angling.  Do not  currently suspect pneumonia      Final Interpretation by Blata Lea MD (03/28 2001)      Low lung volumes with bibasilar atelectasis.            Workstation performed: [id number]           Cardiology:  ECG 12 lead   Final Result by Dhara Pete MD (03/28 7446)   Sinus tachycardia   Cannot rule out Anterior infarct , age undetermined   Abnormal ECG   When compared with ECG of 19-Sep-2021 20:19,   T wave amplitude has decreased in Lateral leads   Confirmed by Dhara Pete ([id number]) on 3/28/2025 3:56:56 PM        GI:  No orders to display     Results from last 7 days   Lab Units 03/29/25  0645   SARS-COV-2  Negative     Results from last 7 days   Lab Units 03/30/25  0442 03/29/25  0452 03/28/25  1548   WBC Thousand/uL 2.89* 4.19* 5.11   HEMOGLOBIN g/dL 10.7* 9.8* 9.8*   HEMATOCRIT % 31.9* 29.5* 29.9*   PLATELETS Thousands/uL 239 185 201   TOTAL NEUT ABS Thousands/µL 1.76*  --   --    BANDS PCT %  --  6 10*     Results from last 7 days   Lab Units 03/30/25 0442 03/29/25  0452 03/28/25  1548   SODIUM mmol/L 137 138 133*   POTASSIUM mmol/L 3.6 4.0 4.2   CHLORIDE mmol/L 104 105 99   CO2 mmol/L 26 23 26   ANION GAP mmol/L 7 10 8   BUN mg/dL 24 21 26*   CREATININE mg/dL 0.89 0.88 1.69*   EGFR ml/min/1.73sq m 66 67 30   CALCIUM mg/dL 8.7 8.1* 8.7   CALCIUM, IONIZED mmol/L  --  1.13  --    MAGNESIUM mg/dL 1.8* 1.8*  --      Results from last 7 days   Lab Units 03/28/25  1548   AST U/L 16   ALT U/L 10   ALK PHOS U/L 53   TOTAL PROTEIN g/dL 4.9*   ALBUMIN g/dL 3.2*   TOTAL BILIRUBIN mg/dL 0.85     Results from last 7 days   Lab Units 03/30/25  0442 03/29/25  0452 03/28/25  1548   GLUCOSE RANDOM mg/dL 107 143* 95     Results from last 7 days   Lab Units 03/29/25  0024   PH CHIKA  7.370   PCO2 CHIKA mm Hg 37.9*   PO2 CHIKA mm Hg 42.1   HCO3 CHIKA mmol/L 21.4*   BASE EXC CHIKA mmol/L -3.5   O2 CONTENT CHIKA ml/dL 11.6   O2 HGB, VENOUS % 73.9     Results from last 7 days   Lab Units 03/28/25  1548   PROTIME seconds 14.1   INR  1.02    PTT seconds 33     Results from last 7 days   Lab Units 03/28/25  1548   TSH 3RD GENERATON uIU/mL 0.367*     Results from last 7 days   Lab Units 03/30/25  0442 03/28/25  1548   PROCALCITONIN ng/ml 1.82* 3.32*     Results from last 7 days   Lab Units 03/28/25  1548   LACTIC ACID mmol/L 0.8     Results from last 7 days   Lab Units 03/28/25  1928   CLARITY UA  Clear   COLOR UA  Yellow   SPEC GRAV UA  1.007   PH UA  5.5   GLUCOSE UA mg/dl Negative   KETONES UA mg/dl Negative   BLOOD UA  Negative   PROTEIN UA mg/dl Negative   NITRITE UA  Negative   BILIRUBIN UA  Negative   UROBILINOGEN UA (BE) mg/dl <2.0   LEUKOCYTES UA  Trace*   WBC UA /hpf 1-2   RBC UA /hpf 1-2   BACTERIA UA /hpf None Seen   EPITHELIAL CELLS WET PREP /hpf Occasional     Results from last 7 days   Lab Units 03/29/25  0645   INFLUENZA A PCR  Negative   INFLUENZA B PCR  Negative   RSV PCR  Negative                 Results from last 7 days   Lab Units 03/29/25  0012   C DIFF TOXIN B BY PCR  Positive*     Results from last 7 days   Lab Units 03/29/25  0929   SALMONELLA SP PCR  Negative   SHIGELLA SP/ENTEROINVASIVE E. COLI (EIEC)  Negative   CAMPYLOBACTER SP (JEJUNI AND COLI)  Negative   SHIGA TOXIN 1/SHIGA TOXIN 2  Negative         Results from last 7 days   Lab Units 03/28/25  1607 03/28/25  1548   BLOOD CULTURE  No Growth at 24 hrs. No Growth at 24 hrs.                   Past Medical History:   Diagnosis Date    Anemia     Cancer (HCC)     Colon polyp     Degenerative joint disease     Disease of thyroid gland     underactive    FHx: non-Hodgkin's lymphoma     macroglobulin anemia    GERD (gastroesophageal reflux disease)     History of back surgery     History of chemotherapy 2014    lymphoma - [id number]    Lumbar disc disease     Shingles 02/02/2024     Present on Admission:   Anemia   (Resolved) JN (acute kidney injury) (HCC)   Acute alcoholic intoxication in alcoholism (HCC)   Essential hypertension   Postoperative malabsorption    Hypothyroid      Admitting Diagnosis: Colitis [K52.9]  Hypotension [I95.9]  Fever [R50.9]  JN (acute kidney injury) (HCC) [N17.9]  Sepsis (HCC) [A41.9]  Age/Sex: 70 y.o. female    Network Utilization Review Department  ATTENTION: Please call with any questions or concerns to [phone] and carefully listen to the prompts so that you are directed to the right person. All voicemails are confidential.   For Discharge needs, contact Care Management DC Support Team at [phone] opt. [phone]  Send all requests for admission clinical reviews, approved or denied determinations and any other requests to dedicated fax number below belonging to the campus where the patient is receiving treatment. List of dedicated fax numbers for the Facilities:  FACILITY NAME UR FAX NUMBER   ADMISSION DENIALS (Administrative/Medical Necessity) [phone]   DISCHARGE SUPPORT TEAM (NETWORK) [phone]   PARENT CHILD HEALTH (Maternity/NICU/Pediatrics) [phone]   [facility] [phone]   [facility] [phone]   [facility] [phone]   [facility] [phone]   [facility] [phone]   [facility] [phone]   [facility] [phone]   [facility] [phone]   [facility] [phone]   [facility] [phone]   [facility] [phone]   [facility] [phone]

## 2025-03-29 NOTE — ASSESSMENT & PLAN NOTE
Recent Labs     03/28/25  1548   HGB 9.8*   *     Lab Results   Component Value Date    IRON 137 08/19/2020    IRON 137 08/19/2020    FERRITIN 316 (H) 08/19/2020    TIBC 293 08/19/2020    TIBC 293 08/19/2020    CONCFE 47 (H) 08/19/2020    CONCFE 47 (H) 08/19/2020   Hx of JERARDO on Fe supplementation   However last labs indicate ACD  EGD 4/24- No overt bleeding  Colonoscopy 2021- Negative    Baseline appears to be around 9-10    Plan-   Will hold Fe supplements in setting of infection   Will check folate/b12   Likely secondary to immunosuppression and plasma cell dyscrasia   Transfuse <7

## 2025-03-29 NOTE — ASSESSMENT & PLAN NOTE
Lab Results   Component Value Date    CREATININE 0.88 03/29/2025   Baseline appears to be around 1?   CT AP- Moderated distended bladder, no suspicious renal mass, hydronephrosis, nephrolithiasis, or perinephric fluid collections bilaterally, Mildly prominent extrarenal pelvis noted bilaterally without interval change  UA- Negative casts, ketones, or proteinuria, negative for bacteruria  Likely in setting of diarrhea however worsening of waldenstrom possibly contributing   Received IV contrast in ED       Plan-   Continue IVF for now   Monitor BMP  Avoid hypoperfusion of the kidneys, minimize nephrotoxins  RAAS Blockers/Diuretics held: Holding all antihypertensives in setting of shock

## 2025-03-29 NOTE — ASSESSMENT & PLAN NOTE
Patient reported history of Cdiff   Received Levaquin and Ceftraixone recently for 7 days in total   Has been having 8+ episodes of profuse watery diarrhea ongoing for over 3 weeks     Plan-   Will test for Cdiff   In the interim will also treat with oral Vancomycin 125mg QD  IVF avoid antidiarrheal st this time until C diff r/o

## 2025-03-29 NOTE — ASSESSMENT & PLAN NOTE
Currently SpO2: 95 % on Nasal Cannula O2 Flow Rate (L/min): 2 L/min  At baseline, uses no O2 supplementation   Hx of tobacco abuse    Plan-   Will check Covid/flu/rsv PCR   Pulmonary toilet   OOB as tolerated   Incentive spirometry   Wean/Adjust O2 as able

## 2025-03-30 PROBLEM — E87.8 ELECTROLYTE ABNORMALITY: Status: ACTIVE | Noted: 2025-03-30

## 2025-03-30 PROBLEM — A04.72 C. DIFFICILE COLITIS: Status: ACTIVE | Noted: 2025-03-30

## 2025-03-30 LAB
ANION GAP SERPL CALCULATED.3IONS-SCNC: 7 MMOL/L (ref 4–13)
BASOPHILS # BLD AUTO: 0.02 THOUSANDS/ÂΜL (ref 0–0.1)
BASOPHILS NFR BLD AUTO: 1 % (ref 0–1)
BUN SERPL-MCNC: 24 MG/DL (ref 5–25)
C COLI+JEJUNI TUF STL QL NAA+PROBE: NEGATIVE
CALCIUM SERPL-MCNC: 8.7 MG/DL (ref 8.4–10.2)
CHLORIDE SERPL-SCNC: 104 MMOL/L (ref 96–108)
CO2 SERPL-SCNC: 26 MMOL/L (ref 21–32)
CREAT SERPL-MCNC: 0.89 MG/DL (ref 0.6–1.3)
EC STX1+STX2 GENES STL QL NAA+PROBE: NEGATIVE
EOSINOPHIL # BLD AUTO: 0.09 THOUSAND/ÂΜL (ref 0–0.61)
EOSINOPHIL NFR BLD AUTO: 3 % (ref 0–6)
ERYTHROCYTE [DISTWIDTH] IN BLOOD BY AUTOMATED COUNT: 13.1 % (ref 11.6–15.1)
GFR SERPL CREATININE-BSD FRML MDRD: 66 ML/MIN/1.73SQ M
GLUCOSE SERPL-MCNC: 107 MG/DL (ref 65–140)
HCT VFR BLD AUTO: 31.9 % (ref 34.8–46.1)
HGB BLD-MCNC: 10.7 G/DL (ref 11.5–15.4)
IMM GRANULOCYTES # BLD AUTO: 0.05 THOUSAND/UL (ref 0–0.2)
IMM GRANULOCYTES NFR BLD AUTO: 2 % (ref 0–2)
LYMPHOCYTES # BLD AUTO: 0.3 THOUSANDS/ÂΜL (ref 0.6–4.47)
LYMPHOCYTES NFR BLD AUTO: 10 % (ref 14–44)
MAGNESIUM SERPL-MCNC: 1.8 MG/DL (ref 1.9–2.7)
MCH RBC QN AUTO: 37.2 PG (ref 26.8–34.3)
MCHC RBC AUTO-ENTMCNC: 33.5 G/DL (ref 31.4–37.4)
MCV RBC AUTO: 111 FL (ref 82–98)
MONOCYTES # BLD AUTO: 0.67 THOUSAND/ÂΜL (ref 0.17–1.22)
MONOCYTES NFR BLD AUTO: 23 % (ref 4–12)
NEUTROPHILS # BLD AUTO: 1.76 THOUSANDS/ÂΜL (ref 1.85–7.62)
NEUTS SEG NFR BLD AUTO: 61 % (ref 43–75)
NRBC BLD AUTO-RTO: 0 /100 WBCS
PLATELET # BLD AUTO: 239 THOUSANDS/UL (ref 149–390)
PMV BLD AUTO: 9.5 FL (ref 8.9–12.7)
POTASSIUM SERPL-SCNC: 3.6 MMOL/L (ref 3.5–5.3)
PROCALCITONIN SERPL-MCNC: 1.82 NG/ML
RBC # BLD AUTO: 2.88 MILLION/UL (ref 3.81–5.12)
SALMONELLA SP SPAO STL QL NAA+PROBE: NEGATIVE
SHIGELLA SP+EIEC IPAH STL QL NAA+PROBE: NEGATIVE
SODIUM SERPL-SCNC: 137 MMOL/L (ref 135–147)
WBC # BLD AUTO: 2.89 THOUSAND/UL (ref 4.31–10.16)

## 2025-03-30 PROCEDURE — 83735 ASSAY OF MAGNESIUM: CPT

## 2025-03-30 PROCEDURE — 99232 SBSQ HOSP IP/OBS MODERATE 35: CPT | Performed by: INTERNAL MEDICINE

## 2025-03-30 PROCEDURE — 84145 PROCALCITONIN (PCT): CPT | Performed by: INTERNAL MEDICINE

## 2025-03-30 PROCEDURE — 85027 COMPLETE CBC AUTOMATED: CPT

## 2025-03-30 PROCEDURE — 80048 BASIC METABOLIC PNL TOTAL CA: CPT

## 2025-03-30 RX ORDER — DRONABINOL 2.5 MG/1
2.5 CAPSULE ORAL
Status: DISCONTINUED | OUTPATIENT
Start: 2025-03-30 | End: 2025-03-30

## 2025-03-30 RX ORDER — DRONABINOL 2.5 MG/1
2.5 CAPSULE ORAL 2 TIMES DAILY PRN
Status: DISCONTINUED | OUTPATIENT
Start: 2025-03-30 | End: 2025-04-01 | Stop reason: HOSPADM

## 2025-03-30 RX ORDER — MAGNESIUM SULFATE HEPTAHYDRATE 40 MG/ML
2 INJECTION, SOLUTION INTRAVENOUS ONCE
Status: COMPLETED | OUTPATIENT
Start: 2025-03-30 | End: 2025-03-31

## 2025-03-30 RX ADMIN — ACETAMINOPHEN 975 MG: 325 TABLET, FILM COATED ORAL at 05:46

## 2025-03-30 RX ADMIN — PIPERACILLIN SODIUM AND TAZOBACTAM SODIUM 4.5 G: 36; 4.5 INJECTION, POWDER, LYOPHILIZED, FOR SOLUTION INTRAVENOUS at 00:06

## 2025-03-30 RX ADMIN — GABAPENTIN 800 MG: 400 CAPSULE ORAL at 15:35

## 2025-03-30 RX ADMIN — SODIUM CHLORIDE, SODIUM ACETATE ANHYDROUS, SODIUM GLUCONATE, POTASSIUM CHLORIDE, AND MAGNESIUM CHLORIDE 125 ML/HR: 526; 222; 502; 37; 30 INJECTION, SOLUTION INTRAVENOUS at 17:12

## 2025-03-30 RX ADMIN — CHLORHEXIDINE GLUCONATE 15 ML: 1.2 SOLUTION ORAL at 22:00

## 2025-03-30 RX ADMIN — HEPARIN SODIUM 5000 UNITS: 5000 INJECTION INTRAVENOUS; SUBCUTANEOUS at 22:00

## 2025-03-30 RX ADMIN — GABAPENTIN 800 MG: 400 CAPSULE ORAL at 22:00

## 2025-03-30 RX ADMIN — MAGNESIUM SULFATE HEPTAHYDRATE 2 G: 40 INJECTION, SOLUTION INTRAVENOUS at 07:02

## 2025-03-30 RX ADMIN — VANCOMYCIN HYDROCHLORIDE 125 MG: 125 CAPSULE ORAL at 11:15

## 2025-03-30 RX ADMIN — HEPARIN SODIUM 5000 UNITS: 5000 INJECTION INTRAVENOUS; SUBCUTANEOUS at 13:17

## 2025-03-30 RX ADMIN — GABAPENTIN 800 MG: 400 CAPSULE ORAL at 08:22

## 2025-03-30 RX ADMIN — PIPERACILLIN SODIUM AND TAZOBACTAM SODIUM 4.5 G: 36; 4.5 INJECTION, POWDER, LYOPHILIZED, FOR SOLUTION INTRAVENOUS at 08:22

## 2025-03-30 RX ADMIN — CHLORHEXIDINE GLUCONATE 15 ML: 1.2 SOLUTION ORAL at 08:23

## 2025-03-30 RX ADMIN — LEVOTHYROXINE SODIUM 50 MCG: 0.05 TABLET ORAL at 05:38

## 2025-03-30 RX ADMIN — ACETAMINOPHEN 975 MG: 325 TABLET, FILM COATED ORAL at 22:00

## 2025-03-30 RX ADMIN — VANCOMYCIN HYDROCHLORIDE 125 MG: 125 CAPSULE ORAL at 05:39

## 2025-03-30 RX ADMIN — VANCOMYCIN HYDROCHLORIDE 125 MG: 125 CAPSULE ORAL at 17:11

## 2025-03-30 RX ADMIN — HEPARIN SODIUM 5000 UNITS: 5000 INJECTION INTRAVENOUS; SUBCUTANEOUS at 05:39

## 2025-03-30 RX ADMIN — VANCOMYCIN HYDROCHLORIDE 125 MG: 125 CAPSULE ORAL at 00:06

## 2025-03-30 RX ADMIN — MELATONIN 4.5 MG: 3 TAB ORAL at 22:00

## 2025-03-30 RX ADMIN — SODIUM CHLORIDE, SODIUM ACETATE ANHYDROUS, SODIUM GLUCONATE, POTASSIUM CHLORIDE, AND MAGNESIUM CHLORIDE 125 ML/HR: 526; 222; 502; 37; 30 INJECTION, SOLUTION INTRAVENOUS at 05:42

## 2025-03-30 NOTE — PLAN OF CARE
Problem: PAIN - ADULT  Goal: Verbalizes/displays adequate comfort level or baseline comfort level  Description: Interventions:- Encourage patient to monitor pain and request assistance- Assess pain using appropriate pain scale- Administer analgesics based on type and severity of pain and evaluate response- Implement non-pharmacological measures as appropriate and evaluate response- Consider cultural and social influences on pain and pain management- Notify physician/advanced practitioner if interventions unsuccessful or patient reports new pain  Outcome: Progressing     Problem: INFECTION - ADULT  Goal: Absence or prevention of progression during hospitalization  Description: INTERVENTIONS:- Assess and monitor for signs and symptoms of infection- Monitor lab/diagnostic results- Monitor all insertion sites, i.e. indwelling lines, tubes, and drains- Monitor endotracheal if appropriate and nasal secretions for changes in amount and color- [place] appropriate cooling/warming therapies per order- Administer medications as ordered- Instruct and encourage patient and family to use good hand hygiene technique- Identify and instruct in appropriate isolation precautions for identified infection/condition  Outcome: Progressing  Goal: Absence of fever/infection during neutropenic period  Description: INTERVENTIONS:- Monitor WBC  Outcome: Progressing     Problem: SAFETY ADULT  Goal: Patient will remain free of falls  Description: INTERVENTIONS:- Educate patient/family on patient safety including physical limitations- Instruct patient to call for assistance with activity - Consult OT/PT to assist with strengthening/mobility - Keep Call bell within reach- Keep bed low and locked with side rails adjusted as appropriate- Keep care items and personal belongings within reach- Initiate and maintain comfort rounds- Make Fall Risk Sign visible to staff- Offer Toileting every  Hours, in advance of need- Initiate/Maintain alarm- Obtain  necessary fall risk management equipment: - Apply yellow socks and bracelet for high fall risk patients- Consider moving patient to room near nurses station  Outcome: Progressing  Goal: Maintain or return to baseline ADL function  Description: INTERVENTIONS:-  Assess patient's ability to carry out ADLs; assess patient's baseline for ADL function and identify physical deficits which impact ability to perform ADLs (bathing, care of mouth/teeth, toileting, grooming, dressing, etc.)- Assess/evaluate cause of self-care deficits - Assess range of motion- Assess patient's mobility; develop plan if impaired- Assess patient's need for assistive devices and provide as appropriate- Encourage maximum independence but intervene and supervise when necessary- Involve family in performance of ADLs- Assess for home care needs following discharge - Consider OT consult to assist with ADL evaluation and planning for discharge- Provide patient education as appropriate  Outcome: Progressing  Goal: Maintains/Returns to pre admission functional level  Description: INTERVENTIONS:- Perform AM-PAC 6 Click Basic Mobility/ Daily Activity assessment daily.- Set and communicate daily mobility goal to care team and patient/family/caregiver. - Collaborate with rehabilitation services on mobility goals if consulted- Perform Range of Motion  times a day.- Reposition patient every  hours.- Dangle patient  times a day- Stand patient  times a day- Ambulate patient  times a day- Out of bed to chair  times a day - Out of bed for meals times a day- Out of bed for toileting- Record patient progress and toleration of activity level   Outcome: Progressing     Problem: DISCHARGE PLANNING  Goal: Discharge to home or other facility with appropriate resources  Description: INTERVENTIONS:- Identify barriers to discharge w/patient and caregiver- Arrange for needed discharge resources and transportation as appropriate- Identify discharge learning needs (meds, wound  care, etc.)- Arrange for interpretive services to assist at discharge as needed- Refer to Case Management Department for coordinating discharge planning if the patient needs post-hospital services based on physician/advanced practitioner order or complex needs related to functional status, cognitive ability, or social support system  Outcome: Progressing     Problem: Knowledge Deficit  Goal: Patient/family/caregiver demonstrates understanding of disease process, treatment plan, medications, and discharge instructions  Description: Complete learning assessment and assess knowledge base.Interventions:- Provide teaching at level of understanding- Provide teaching via preferred learning methods  Outcome: Progressing

## 2025-03-30 NOTE — ASSESSMENT & PLAN NOTE
Lab Results   Component Value Date     [id number] 0.367 (L) 03/28/2025     FREET4 0.97 03/28/2025      TSH low however T4 wnl  Pt on levothyroxine 50mcg daily     Not likely cause of diarrhea   PLAN:  Continue levothyroxine

## 2025-03-30 NOTE — ASSESSMENT & PLAN NOTE
Magnesium low 1.8 this morning    Plan:  Replenished 2 g IV magnesium sulfate  Check magnesium in a.m.

## 2025-03-30 NOTE — ASSESSMENT & PLAN NOTE
Currently holding home antihypertensives in setting of hypotension   -Patient states her blood pressure at home typically runs low between  systolically

## 2025-03-30 NOTE — ASSESSMENT & PLAN NOTE
3 weeks of diarrhea yellow will wake her up around 4:00 am  Not related to Brukinsa as she has been on for a year and stopped after onset of diarrhea   Pt has been weaning herself off percocet for about 3 weeks possible etiology     Infx vs vs malabsorption complications for bariatric surgery, vs EtOH vs opioid withdrawal vs Hyperthyroidism     PLAN:  Continue infectious workup  Monitor for signs and symptoms of withdrawal  CIWA  Potential GI consult

## 2025-03-30 NOTE — ASSESSMENT & PLAN NOTE
History of C. difficile approximately 5 years ago, she did just have recent antibiotic course with Levaquin and ceftriaxone    -See plan above

## 2025-03-30 NOTE — ASSESSMENT & PLAN NOTE
Concern for Sepsis POA as evidenced by fever, tachycardia, bandemia, hypotension   Suspected source: Intra-abdominal            Lab Results   Component Value Date     WBC 4.19 (L) 03/29/2025     HGB 9.8 (L) 03/29/2025     HCT 29.5 (L) 03/29/2025      (H) 03/29/2025      03/29/2025          Recent Labs     03/28/25  1548   LACTICACID 0.8   Bands 10% on admission   Procal 3.32  UA- Negative  Covid/Flu Rapid Ag- Negative  CT CAP w/con- Mild proximal colitis and severe mid to distal colitis, cannot exclude pseudomembranous colitis especially involving the mid to distal colon, Distended appendix measuring up to 8 mm without surrounding inflammatory changes   Cxray- Atelectasis   Received Cefepime and Vancomycin in ED   Was treated by outpatient oncologist for possible UTI with IV Levaquin and Ceftriaxone  -COVID flu RSV negative  C. Diff +     Plan-   No further indication for Zosyn will  discontinue  Continue oral vancomycin  Blood Cultures   Stool enteric panel, and Stool O&P pending  Trend WBC/Fever curve  Received adequate sepsis fluid resuscitation fluids of 3L   Will continue Isolyte IVF

## 2025-03-30 NOTE — ASSESSMENT & PLAN NOTE
Has been tapering off opioids for 3 weeks   -Will add 2.5 mg Marinol twice daily as needed for pain control, she uses medical marijuana at home

## 2025-03-30 NOTE — ASSESSMENT & PLAN NOTE
Lab Results   Component Value Date     IRON 137 08/19/2020     IRON 137 08/19/2020     FERRITIN 316 (H) 08/19/2020     TIBC 293 08/19/2020     TIBC 293 08/19/2020     CONCFE 47 (H) 08/19/2020     CONCFE 47 (H) 08/19/2020   Hx of JERARDO on Fe supplementation   However last labs indicate ACD  EGD 4/24- No overt bleeding  Colonoscopy 2021- Negative    Baseline appears to be around 9-10     Plan-   Will hold Fe supplements in setting of infection   Likely secondary to immunosuppression and plasma cell dyscrasia   Transfuse <7

## 2025-03-30 NOTE — PROGRESS NOTES
Progress Note - Hospitalist   Name: Anisa Long 70 y.o. female I MRN: [id number]  Unit/Bed#: ICU 15 I Date of Admission: 3/28/2025   Date of Service: 3/30/2025 I Hospital Day: 2    Assessment & Plan  Sepsis (HCC)  Concern for Sepsis POA as evidenced by fever, tachycardia, bandemia, hypotension   Suspected source: Intra-abdominal            Lab Results   Component Value Date     WBC 4.19 (L) 03/29/2025     HGB 9.8 (L) 03/29/2025     HCT 29.5 (L) 03/29/2025      (H) 03/29/2025      03/29/2025          Recent Labs     03/28/25  1548   LACTICACID 0.8   Bands 10% on admission   Procal 3.32  UA- Negative  Covid/Flu Rapid Ag- Negative  CT CAP w/con- Mild proximal colitis and severe mid to distal colitis, cannot exclude pseudomembranous colitis especially involving the mid to distal colon, Distended appendix measuring up to 8 mm without surrounding inflammatory changes   Cxray- Atelectasis   Received Cefepime and Vancomycin in ED   Was treated by outpatient oncologist for possible UTI with IV Levaquin and Ceftriaxone  -COVID flu RSV negative  C. Diff +     Plan-   No further indication for Zosyn will  discontinue  Continue oral vancomycin  Blood Cultures   Stool enteric panel, and Stool O&P pending  Trend WBC/Fever curve  Received adequate sepsis fluid resuscitation fluids of 3L   Will continue Isolyte IVF   History of non-Hodgkin's lymphoma  Follows Dr. Ramos as outpatient  Lymphoma in remission   Was on Brukinsa for [place] Macroglobulinemia- stopped around 1 week ago due to diarrheal symptoms     Plan-   Will hold medication   Follow up outpatient oncology   Hypothyroid  Lab Results   Component Value Date     [id number] 0.367 (L) 03/28/2025     FREET4 0.97 03/28/2025      TSH low however T4 wnl  Pt on levothyroxine 50mcg daily     Not likely cause of diarrhea   PLAN:  Continue levothyroxine  Postoperative malabsorption    Anemia  Lab Results   Component Value Date     IRON 137  08/19/2020     IRON 137 08/19/2020     FERRITIN 316 (H) 08/19/2020     TIBC 293 08/19/2020     TIBC 293 08/19/2020     CONCFE 47 (H) 08/19/2020     CONCFE 47 (H) 08/19/2020   Hx of JERARDO on Fe supplementation   However last labs indicate ACD  EGD 4/24- No overt bleeding  Colonoscopy 2021- Negative    Baseline appears to be around 9-10     Plan-   Will hold Fe supplements in setting of infection   Likely secondary to immunosuppression and plasma cell dyscrasia   Transfuse <7   Essential hypertension  Currently holding home antihypertensives in setting of hypotension   -Patient states her blood pressure at home typically runs low between  systolically  History of Clostridium difficile infection  History of C. difficile approximately 5 years ago, she did just have recent antibiotic course with Levaquin and ceftriaxone    -See plan above  S/P bariatric surgery    Acute alcoholic intoxication in alcoholism (HCC)  Hx of alcohol abuse  Drinks 3 glasses a wine per day     PLAN:  CIWA for 72 hrs  Chronically on opiate therapy  Has been tapering off opioids for 3 weeks   -Will add 2.5 mg Marinol twice daily as needed for pain control, she uses medical marijuana at home  Chronic diarrhea  3 weeks of diarrhea yellow will wake her up around 4:00 am  Not related to Brukinsa as she has been on for a year and stopped after onset of diarrhea   Pt has been weaning herself off percocet for about 3 weeks possible etiology     Infx vs vs malabsorption complications for bariatric surgery, vs EtOH vs opioid withdrawal vs Hyperthyroidism     PLAN:  Continue infectious workup  Monitor for signs and symptoms of withdrawal  CIWA  Potential GI consult  Electrolyte abnormality  Magnesium low 1.8 this morning    Plan:  Replenished 2 g IV magnesium sulfate  Check magnesium in a.m.    VTE Pharmacologic Prophylaxis: VTE Score: 7 High Risk (Score >/= 5) - Pharmacological DVT Prophylaxis Ordered: heparin. Sequential Compression Devices  Ordered.    Mobility:   Basic Mobility Inpatient Raw Score: 18  JH-HLM Goal: 6: Walk 10 steps or more  JH-HLM Achieved: 4: Move to chair/commode  JH-HLM Goal NOT achieved. Continue with multidisciplinary rounding and encourage appropriate mobility to improve upon JH-HLM goals.    Patient Centered Rounds: I performed bedside rounds with nursing staff today.   Discussions with Specialists or Other Care Team Provider: None     Education and Discussions with Family / Patient: Updated  () via phone.    Current Length of Stay: 2 day(s)  Current Patient Status: Inpatient   Certification Statement: The patient will continue to require additional inpatient hospital stay due to further infectious workup   Discharge Plan: Anticipate discharge in 24-48 hrs to home.    Code Status: Level 1 - Full Code    Subjective   Patient seen and examined at bedside.  She states she is still having diarrhea approximately 4-5 episodes of watery diarrhea overnight she does have abdominal pain with bowel movements.  She denies any fever or chills.  She denies any chest pain or shortness of breath.     Objective :  Temp:  [98 °F (36.7 °C)-98.9 °F (37.2 °C)] 98 °F (36.7 °C)  HR:  [81-93] 91  BP: ()/(51-84) 90/55  Resp:  [15-26] 26  SpO2:  [94 %-95 %] 94 %  O2 Device: None (Room air)    Body mass index is 34.16 kg/m².     Input and Output Summary (last 24 hours):     Intake/Output Summary (Last 24 hours) at 3/30/2025 1123  Last data filed at 3/30/2025 0800  Gross per 24 hour   Intake 3374.17 ml   Output 300 ml   Net 3074.17 ml       Physical Exam  Constitutional:       General: She is not in acute distress.     Appearance: She is ill-appearing.   HENT:      Mouth/Throat:      Mouth: Mucous membranes are moist.      Pharynx: Oropharynx is clear.   Cardiovascular:      Rate and Rhythm: Normal rate and regular rhythm.      Pulses: Normal pulses.      Heart sounds: Normal heart sounds. No murmur heard.  Pulmonary:       Effort: Pulmonary effort is normal. No respiratory distress.      Breath sounds: Normal breath sounds. No wheezing, rhonchi or rales.   Abdominal:      General: Bowel sounds are normal. There is no distension.      Palpations: Abdomen is soft.      Tenderness: There is abdominal tenderness. There is no guarding or rebound.   Musculoskeletal:      Right lower leg: No edema.      Left lower leg: No edema.   Skin:     General: Skin is warm and dry.   Neurological:      General: No focal deficit present.      Mental Status: She is oriented to person, place, and time.   Psychiatric:         Mood and Affect: Mood normal.         Behavior: Behavior normal.         Lines/Drains:              Lab Results: I have reviewed the following results:   Results from last 7 days   Lab Units 03/30/25 0442 03/29/25  0452   WBC Thousand/uL 2.89* 4.19*   HEMOGLOBIN g/dL 10.7* 9.8*   HEMATOCRIT % 31.9* 29.5*   PLATELETS Thousands/uL 239 185   BANDS PCT %  --  6   SEGS PCT % 61  --    LYMPHO PCT % 10* 13*   MONO PCT % 23* 7   EOS PCT % 3 0     Results from last 7 days   Lab Units 03/30/25  0442 03/29/25  0452 03/28/25  1548   SODIUM mmol/L 137   < > 133*   POTASSIUM mmol/L 3.6   < > 4.2   CHLORIDE mmol/L 104   < > 99   CO2 mmol/L 26   < > 26   BUN mg/dL 24   < > 26*   CREATININE mg/dL 0.89   < > 1.69*   ANION GAP mmol/L 7   < > 8   CALCIUM mg/dL 8.7   < > 8.7   ALBUMIN g/dL  --   --  3.2*   TOTAL BILIRUBIN mg/dL  --   --  0.85   ALK PHOS U/L  --   --  53   ALT U/L  --   --  10   AST U/L  --   --  16   GLUCOSE RANDOM mg/dL 107   < > 95    < > = values in this interval not displayed.     Results from last 7 days   Lab Units 03/28/25  1548   INR  1.02             Results from last 7 days   Lab Units 03/30/25  0442 03/28/25  1548   LACTIC ACID mmol/L  --  0.8   PROCALCITONIN ng/ml 1.82* 3.32*       Recent Cultures (last 7 days):   Results from last 7 days   Lab Units 03/29/25  0012 03/28/25  1607 03/28/25  1548   BLOOD CULTURE   --  No Growth  at 24 hrs. No Growth at 24 hrs.   C DIFF TOXIN B BY PCR  Positive*  --   --              Last 24 Hours Medication List:     Current Facility-Administered Medications:     acetaminophen (TYLENOL) tablet 975 mg, Q8H PRN    chlorhexidine (PERIDEX) 0.12 % oral rinse 15 mL, Q12H FLORENTINO    dronabinol (MARINOL) capsule 2.5 mg, BID PRN    gabapentin (NEURONTIN) capsule 800 mg, TID    heparin (porcine) subcutaneous injection 5,000 Units, Q8H FLORENTINO    levothyroxine tablet 50 mcg, Early Morning    melatonin tablet 4.5 mg, HS    multi-electrolyte (Plasmalyte-A/Isolyte-S PH 7.4/Normosol-R) IV solution, Continuous, Last Rate: 125 mL/hr (03/30/25 0542)    oxyCODONE (ROXICODONE) immediate release tablet 10 mg, TID PRN    vancomycin (VANCOCIN) capsule 125 mg, Q6H FLORENTINO    Administrative Statements   Today, Patient Was Seen By: Darek Cross Jr, DO      **Please Note: This note may have been constructed using a voice recognition system.**

## 2025-03-31 PROBLEM — E87.8 ELECTROLYTE ABNORMALITY: Status: RESOLVED | Noted: 2025-03-30 | Resolved: 2025-03-31

## 2025-03-31 LAB
ANION GAP SERPL CALCULATED.3IONS-SCNC: 6 MMOL/L (ref 4–13)
BUN SERPL-MCNC: 11 MG/DL (ref 5–25)
CALCIUM SERPL-MCNC: 8.8 MG/DL (ref 8.4–10.2)
CHLORIDE SERPL-SCNC: 106 MMOL/L (ref 96–108)
CO2 SERPL-SCNC: 27 MMOL/L (ref 21–32)
CREAT SERPL-MCNC: 0.7 MG/DL (ref 0.6–1.3)
ERYTHROCYTE [DISTWIDTH] IN BLOOD BY AUTOMATED COUNT: 13.2 % (ref 11.6–15.1)
GFR SERPL CREATININE-BSD FRML MDRD: 89 ML/MIN/1.73SQ M
GLUCOSE SERPL-MCNC: 90 MG/DL (ref 65–140)
HCT VFR BLD AUTO: 30.8 % (ref 34.8–46.1)
HGB BLD-MCNC: 10.2 G/DL (ref 11.5–15.4)
MAGNESIUM SERPL-MCNC: 2 MG/DL (ref 1.9–2.7)
MCH RBC QN AUTO: 37 PG (ref 26.8–34.3)
MCHC RBC AUTO-ENTMCNC: 33.1 G/DL (ref 31.4–37.4)
MCV RBC AUTO: 112 FL (ref 82–98)
PLATELET # BLD AUTO: 270 THOUSANDS/UL (ref 149–390)
PMV BLD AUTO: 9.7 FL (ref 8.9–12.7)
POTASSIUM SERPL-SCNC: 3.8 MMOL/L (ref 3.5–5.3)
RBC # BLD AUTO: 2.76 MILLION/UL (ref 3.81–5.12)
SODIUM SERPL-SCNC: 139 MMOL/L (ref 135–147)
WBC # BLD AUTO: 2.44 THOUSAND/UL (ref 4.31–10.16)

## 2025-03-31 PROCEDURE — 99232 SBSQ HOSP IP/OBS MODERATE 35: CPT | Performed by: INTERNAL MEDICINE

## 2025-03-31 PROCEDURE — 85027 COMPLETE CBC AUTOMATED: CPT

## 2025-03-31 PROCEDURE — 85060 BLOOD SMEAR INTERPRETATION: CPT | Performed by: PATHOLOGY

## 2025-03-31 PROCEDURE — 83735 ASSAY OF MAGNESIUM: CPT

## 2025-03-31 PROCEDURE — 80048 BASIC METABOLIC PNL TOTAL CA: CPT

## 2025-03-31 PROCEDURE — 85007 BL SMEAR W/DIFF WBC COUNT: CPT

## 2025-03-31 RX ORDER — HYDROCHLOROTHIAZIDE 12.5 MG/1
12.5 TABLET ORAL EVERY EVENING
Status: DISCONTINUED | OUTPATIENT
Start: 2025-03-31 | End: 2025-04-01 | Stop reason: HOSPADM

## 2025-03-31 RX ADMIN — GABAPENTIN 800 MG: 400 CAPSULE ORAL at 15:04

## 2025-03-31 RX ADMIN — HYDROCHLOROTHIAZIDE 12.5 MG: 12.5 TABLET ORAL at 15:05

## 2025-03-31 RX ADMIN — LEVOTHYROXINE SODIUM 50 MCG: 0.05 TABLET ORAL at 05:09

## 2025-03-31 RX ADMIN — ACETAMINOPHEN 975 MG: 325 TABLET, FILM COATED ORAL at 21:30

## 2025-03-31 RX ADMIN — SODIUM CHLORIDE, SODIUM ACETATE ANHYDROUS, SODIUM GLUCONATE, POTASSIUM CHLORIDE, AND MAGNESIUM CHLORIDE 125 ML/HR: 526; 222; 502; 37; 30 INJECTION, SOLUTION INTRAVENOUS at 10:12

## 2025-03-31 RX ADMIN — HEPARIN SODIUM 5000 UNITS: 5000 INJECTION INTRAVENOUS; SUBCUTANEOUS at 05:10

## 2025-03-31 RX ADMIN — VANCOMYCIN HYDROCHLORIDE 125 MG: 125 CAPSULE ORAL at 17:20

## 2025-03-31 RX ADMIN — HEPARIN SODIUM 5000 UNITS: 5000 INJECTION INTRAVENOUS; SUBCUTANEOUS at 14:12

## 2025-03-31 RX ADMIN — HEPARIN SODIUM 5000 UNITS: 5000 INJECTION INTRAVENOUS; SUBCUTANEOUS at 21:30

## 2025-03-31 RX ADMIN — GABAPENTIN 800 MG: 400 CAPSULE ORAL at 21:30

## 2025-03-31 RX ADMIN — VANCOMYCIN HYDROCHLORIDE 125 MG: 125 CAPSULE ORAL at 00:12

## 2025-03-31 RX ADMIN — MELATONIN 4.5 MG: 3 TAB ORAL at 21:29

## 2025-03-31 RX ADMIN — GABAPENTIN 800 MG: 400 CAPSULE ORAL at 08:36

## 2025-03-31 RX ADMIN — VANCOMYCIN HYDROCHLORIDE 125 MG: 125 CAPSULE ORAL at 23:26

## 2025-03-31 RX ADMIN — VANCOMYCIN HYDROCHLORIDE 125 MG: 125 CAPSULE ORAL at 11:22

## 2025-03-31 RX ADMIN — VANCOMYCIN HYDROCHLORIDE 125 MG: 125 CAPSULE ORAL at 05:10

## 2025-03-31 RX ADMIN — CHLORHEXIDINE GLUCONATE 15 ML: 1.2 SOLUTION ORAL at 21:30

## 2025-03-31 RX ADMIN — CHLORHEXIDINE GLUCONATE 15 ML: 1.2 SOLUTION ORAL at 08:36

## 2025-03-31 NOTE — ASSESSMENT & PLAN NOTE
Lab Results   Component Value Date     [id number] 0.367 (L) 03/28/2025     FREET4 0.97 03/28/2025      TSH low however T4 wnl  Pt on levothyroxine 50mcg daily     Not likely cause of diarrhea   PLAN:  Continue levothyroxine

## 2025-03-31 NOTE — PLAN OF CARE
Jesse Jasmeet  03/31/25      Problem: PAIN - ADULT  Goal: Verbalizes/displays adequate comfort level or baseline comfort level  Description: Interventions:- Encourage patient to monitor pain and request assistance- Assess pain using appropriate pain scale- Administer analgesics based on type and severity of pain and evaluate response- Implement non-pharmacological measures as appropriate and evaluate response- Consider cultural and social influences on pain and pain management- Notify physician/advanced practitioner if interventions unsuccessful or patient reports new pain  Outcome: Progressing     Problem: INFECTION - ADULT  Goal: Absence or prevention of progression during hospitalization  Description: INTERVENTIONS:- Assess and monitor for signs and symptoms of infection- Monitor lab/diagnostic results- Monitor all insertion sites, i.e. indwelling lines, tubes, and drains- Monitor endotracheal if appropriate and nasal secretions for changes in amount and color- [place] appropriate cooling/warming therapies per order- Administer medications as ordered- Instruct and encourage patient and family to use good hand hygiene technique- Identify and instruct in appropriate isolation precautions for identified infection/condition  Outcome: Progressing  Goal: Absence of fever/infection during neutropenic period  Description: INTERVENTIONS:- Monitor WBC  Outcome: Progressing     Problem: DISCHARGE PLANNING  Goal: Discharge to home or other facility with appropriate resources  Description: INTERVENTIONS:- Identify barriers to discharge w/patient and caregiver- Arrange for needed discharge resources and transportation as appropriate- Identify discharge learning needs (meds, wound care, etc.)- Arrange for interpretive services to assist at discharge as needed- Refer to Case Management Department for coordinating discharge planning if the patient needs post-hospital services based on physician/advanced practitioner order or  complex needs related to functional status, cognitive ability, or social support system  Outcome: Progressing     Problem: Knowledge Deficit  Goal: Patient/family/caregiver demonstrates understanding of disease process, treatment plan, medications, and discharge instructions  Description: Complete learning assessment and assess knowledge base.Interventions:- Provide teaching at level of understanding- Provide teaching via preferred learning methods  Outcome: Progressing

## 2025-03-31 NOTE — PROGRESS NOTES
Progress Note - Hospitalist   Name: Anisa Long 70 y.o. female I MRN: [id number]  Unit/Bed#: ICU 15 I Date of Admission: 3/28/2025   Date of Service: 3/31/2025 I Hospital Day: 3    Assessment & Plan  Sepsis (HCC)  Concern for Sepsis POA as evidenced by fever, tachycardia, bandemia, hypotension   Suspected source: Intra-abdominal            Lab Results   Component Value Date     WBC 4.19 (L) 03/29/2025     HGB 9.8 (L) 03/29/2025     HCT 29.5 (L) 03/29/2025      (H) 03/29/2025      03/29/2025          Recent Labs     03/28/25  1548   LACTICACID 0.8   Bands 10% on admission   Procal 3.32  UA- Negative  Covid/Flu Rapid Ag- Negative  CT CAP w/con- Mild proximal colitis and severe mid to distal colitis, cannot exclude pseudomembranous colitis especially involving the mid to distal colon, Distended appendix measuring up to 8 mm without surrounding inflammatory changes   Cxray- Atelectasis   Received Cefepime and Vancomycin in ED   Was treated by outpatient oncologist for possible UTI with IV Levaquin and Ceftriaxone  -COVID flu RSV negative  C. Diff +     Plan-   No further indication for Zosyn will  discontinue  Continue oral vancomycin plan for 14 day course today day (3/14)  Blood Cultures negative 48 hours  Stool enteric panel negative. Stool O&P pending  Trend WBC/Fever curve  Received adequate sepsis fluid resuscitation fluids of 3L   Will continue Isolyte IVF   History of non-Hodgkin's lymphoma  Follows Dr. Ramos as outpatient  Lymphoma in remission   Was on Brukinsa for [place] Macroglobulinemia- stopped around 1 week ago due to diarrheal symptoms     Plan-   Will hold medication   Follow up outpatient oncology   Hypothyroid  Lab Results   Component Value Date     [id number] 0.367 (L) 03/28/2025     FREET4 0.97 03/28/2025      TSH low however T4 wnl  Pt on levothyroxine 50mcg daily     Not likely cause of diarrhea   PLAN:  Continue levothyroxine  Postoperative  malabsorption    Anemia  Lab Results   Component Value Date     IRON 137 08/19/2020     IRON 137 08/19/2020     FERRITIN 316 (H) 08/19/2020     TIBC 293 08/19/2020     TIBC 293 08/19/2020     CONCFE 47 (H) 08/19/2020     CONCFE 47 (H) 08/19/2020   Hx of JERARDO on Fe supplementation   However last labs indicate ACD  EGD 4/24- No overt bleeding  Colonoscopy 2021- Negative    Baseline appears to be around 9-10     Plan-   Will hold Fe supplements in setting of infection   Likely secondary to immunosuppression and plasma cell dyscrasia   Transfuse <7   Essential hypertension  Currently holding home antihypertensives in setting of hypotension   -Patient states her blood pressure at home typically runs low between  systolically  History of Clostridium difficile infection  History of C. difficile approximately 5 years ago, she did just have recent antibiotic course with Levaquin and ceftriaxone    -See plan above  S/P bariatric surgery    Acute alcoholic intoxication in alcoholism (HCC)  Hx of alcohol abuse  Drinks 3 glasses a wine per day     PLAN:  CIWA for 72 hrs  Chronically on opiate therapy  Has been tapering off opioids for 3 weeks   -Will add 2.5 mg Marinol twice daily as needed for pain control, she uses medical marijuana at home  Chronic diarrhea  3 weeks of diarrhea yellow will wake her up around 4:00 am  Not related to Brukinsa as she has been on for a year and stopped after onset of diarrhea   Pt has been weaning herself off percocet for about 3 weeks possible etiology     Infx vs vs malabsorption complications for bariatric surgery, vs EtOH vs opioid withdrawal vs Hyperthyroidism     PLAN:  Continue infectious workup  Monitor for signs and symptoms of withdrawal  CIWA  Potential GI consult  C. difficile colitis  See plan under sepsis    VTE Pharmacologic Prophylaxis: VTE Score: 7 High Risk (Score >/= 5) - Pharmacological DVT Prophylaxis Ordered: heparin. Sequential Compression Devices Ordered.    Mobility:    Basic Mobility Inpatient Raw Score: 24  JH-HLM Goal: 8: Walk 250 feet or more  JH-HLM Achieved: 4: Move to chair/commode  JH-HLM Goal NOT achieved. Continue with multidisciplinary rounding and encourage appropriate mobility to improve upon JH-HLM goals.    Patient Centered Rounds: I performed bedside rounds with nursing staff today.   Discussions with Specialists or Other Care Team Provider: None    Education and Discussions with Family / Patient: Updated  () at bedside.    Current Length of Stay: 3 day(s)  Current Patient Status: Inpatient   Certification Statement: The patient will continue to require additional inpatient hospital stay due to C. difficile diarrhea  Discharge Plan: Anticipate discharge in 24-48 hrs to home.    Code Status: Level 1 - Full Code    Subjective   Patient seen and examined at bedside, no acute events overnight.  She still endorsing watery diarrhea approximately 4-5 episodes overnight.  She does not indicate any abdominal pain, fever, or chills.  She denies any lightheadedness or dizziness overall she is doing okay she states.  She is able to tolerate oral diet.    Objective :  Temp:  [97.8 °F (36.6 °C)-99.5 °F (37.5 °C)] 98.1 °F (36.7 °C)  HR:  [77-98] 77  BP: ()/(53-73) 114/53  Resp:  [16-29] 16  SpO2:  [94 %-98 %] 98 %  O2 Device: None (Room air)    Body mass index is 34.16 kg/m².     Input and Output Summary (last 24 hours):     Intake/Output Summary (Last 24 hours) at 3/31/2025 1457  Last data filed at 3/31/2025 1101  Gross per 24 hour   Intake 2252.5 ml   Output --   Net 2252.5 ml       Physical Exam  Constitutional:       General: She is not in acute distress.  HENT:      Mouth/Throat:      Mouth: Mucous membranes are moist.      Pharynx: Oropharynx is clear.   Cardiovascular:      Rate and Rhythm: Normal rate and regular rhythm.      Pulses: Normal pulses.      Heart sounds: Normal heart sounds. No murmur heard.  Pulmonary:      Effort: Pulmonary  effort is normal. No respiratory distress.      Breath sounds: Normal breath sounds.   Abdominal:      General: Bowel sounds are normal.      Palpations: Abdomen is soft.      Tenderness: There is no abdominal tenderness. There is no guarding or rebound.   Musculoskeletal:      Right lower leg: Edema present.      Left lower leg: Edema present.   Skin:     General: Skin is warm and dry.      Capillary Refill: Capillary refill takes less than 2 seconds.   Neurological:      General: No focal deficit present.      Mental Status: She is alert and oriented to person, place, and time.   Psychiatric:         Mood and Affect: Mood normal.         Behavior: Behavior normal.           Lines/Drains:              Lab Results: I have reviewed the following results:   Results from last 7 days   Lab Units 03/31/25 0455 03/30/25 0442 03/29/25  0452   WBC Thousand/uL 2.44* 2.89* 4.19*   HEMOGLOBIN g/dL 10.2* 10.7* 9.8*   HEMATOCRIT % 30.8* 31.9* 29.5*   PLATELETS Thousands/uL 270 239 185   BANDS PCT %  --   --  6   SEGS PCT %  --  61  --    LYMPHO PCT %  --  10* 13*   MONO PCT %  --  23* 7   EOS PCT %  --  3 0     Results from last 7 days   Lab Units 03/31/25  0455 03/29/25 0452 03/28/25  1548   SODIUM mmol/L 139   < > 133*   POTASSIUM mmol/L 3.8   < > 4.2   CHLORIDE mmol/L 106   < > 99   CO2 mmol/L 27   < > 26   BUN mg/dL 11   < > 26*   CREATININE mg/dL 0.70   < > 1.69*   ANION GAP mmol/L 6   < > 8   CALCIUM mg/dL 8.8   < > 8.7   ALBUMIN g/dL  --   --  3.2*   TOTAL BILIRUBIN mg/dL  --   --  0.85   ALK PHOS U/L  --   --  53   ALT U/L  --   --  10   AST U/L  --   --  16   GLUCOSE RANDOM mg/dL 90   < > 95    < > = values in this interval not displayed.     Results from last 7 days   Lab Units 03/28/25  1548   INR  1.02             Results from last 7 days   Lab Units 03/30/25  0442 03/28/25  1548   LACTIC ACID mmol/L  --  0.8   PROCALCITONIN ng/ml 1.82* 3.32*       Recent Cultures (last 7 days):   Results from last 7 days   Lab  Units 03/29/25  0012 03/28/25  1607 03/28/25  1548   BLOOD CULTURE   --  No Growth at 48 hrs. No Growth at 48 hrs.   C DIFF TOXIN B BY PCR  Positive*  --   --              Last 24 Hours Medication List:     Current Facility-Administered Medications:     acetaminophen (TYLENOL) tablet 975 mg, Q8H PRN    chlorhexidine (PERIDEX) 0.12 % oral rinse 15 mL, Q12H FLORENTINO    dronabinol (MARINOL) capsule 2.5 mg, BID PRN    gabapentin (NEURONTIN) capsule 800 mg, TID    heparin (porcine) subcutaneous injection 5,000 Units, Q8H FLORENTINO    hydroCHLOROthiazide tablet 12.5 mg, QPM    levothyroxine tablet 50 mcg, Early Morning    melatonin tablet 4.5 mg, HS    multi-electrolyte (Plasmalyte-A/Isolyte-S PH 7.4/Normosol-R) IV solution, Continuous, Last Rate: 125 mL/hr (03/31/25 1012)    oxyCODONE (ROXICODONE) immediate release tablet 10 mg, TID PRN    vancomycin (VANCOCIN) capsule 125 mg, Q6H FLORENTINO    Administrative Statements   Today, Patient Was Seen By: Darek Cross Jr, DO      **Please Note: This note may have been constructed using a voice recognition system.**

## 2025-03-31 NOTE — ASSESSMENT & PLAN NOTE
Concern for Sepsis POA as evidenced by fever, tachycardia, bandemia, hypotension   Suspected source: Intra-abdominal            Lab Results   Component Value Date     WBC 4.19 (L) 03/29/2025     HGB 9.8 (L) 03/29/2025     HCT 29.5 (L) 03/29/2025      (H) 03/29/2025      03/29/2025          Recent Labs     03/28/25  1548   LACTICACID 0.8   Bands 10% on admission   Procal 3.32  UA- Negative  Covid/Flu Rapid Ag- Negative  CT CAP w/con- Mild proximal colitis and severe mid to distal colitis, cannot exclude pseudomembranous colitis especially involving the mid to distal colon, Distended appendix measuring up to 8 mm without surrounding inflammatory changes   Cxray- Atelectasis   Received Cefepime and Vancomycin in ED   Was treated by outpatient oncologist for possible UTI with IV Levaquin and Ceftriaxone  -COVID flu RSV negative  C. Diff +     Plan-   No further indication for Zosyn will  discontinue  Continue oral vancomycin plan for 14 day course today day (3/14)  Blood Cultures negative 48 hours  Stool enteric panel negative. Stool O&P pending  Trend WBC/Fever curve  Received adequate sepsis fluid resuscitation fluids of 3L   Will continue Isolyte IVF

## 2025-03-31 NOTE — PLAN OF CARE
Problem: PAIN - ADULT  Goal: Verbalizes/displays adequate comfort level or baseline comfort level  Description: Interventions:- Encourage patient to monitor pain and request assistance- Assess pain using appropriate pain scale- Administer analgesics based on type and severity of pain and evaluate response- Implement non-pharmacological measures as appropriate and evaluate response- Consider cultural and social influences on pain and pain management- Notify physician/advanced practitioner if interventions unsuccessful or patient reports new pain  Outcome: Progressing     Problem: INFECTION - ADULT  Goal: Absence or prevention of progression during hospitalization  Description: INTERVENTIONS:- Assess and monitor for signs and symptoms of infection- Monitor lab/diagnostic results- Monitor all insertion sites, i.e. indwelling lines, tubes, and drains- Monitor endotracheal if appropriate and nasal secretions for changes in amount and color- [place] appropriate cooling/warming therapies per order- Administer medications as ordered- Instruct and encourage patient and family to use good hand hygiene technique- Identify and instruct in appropriate isolation precautions for identified infection/condition  Outcome: Progressing  Goal: Absence of fever/infection during neutropenic period  Description: INTERVENTIONS:- Monitor WBC  Outcome: Progressing     Problem: SAFETY ADULT  Goal: Patient will remain free of falls  Description: INTERVENTIONS:- Educate patient/family on patient safety including physical limitations- Instruct patient to call for assistance with activity - Consult OT/PT to assist with strengthening/mobility - Keep Call bell within reach- Keep bed low and locked with side rails adjusted as appropriate- Keep care items and personal belongings within reach- Initiate and maintain comfort rounds- Make Fall Risk Sign visible to staff- Offer Toileting every  Hours, in advance of need- Initiate/Maintain alarm- Obtain  necessary fall risk management equipment: - Apply yellow socks and bracelet for high fall risk patients- Consider moving patient to room near nurses station  Outcome: Progressing  Goal: Maintain or return to baseline ADL function  Description: INTERVENTIONS:-  Assess patient's ability to carry out ADLs; assess patient's baseline for ADL function and identify physical deficits which impact ability to perform ADLs (bathing, care of mouth/teeth, toileting, grooming, dressing, etc.)- Assess/evaluate cause of self-care deficits - Assess range of motion- Assess patient's mobility; develop plan if impaired- Assess patient's need for assistive devices and provide as appropriate- Encourage maximum independence but intervene and supervise when necessary- Involve family in performance of ADLs- Assess for home care needs following discharge - Consider OT consult to assist with ADL evaluation and planning for discharge- Provide patient education as appropriate  Outcome: Progressing  Goal: Maintains/Returns to pre admission functional level  Description: INTERVENTIONS:- Perform AM-PAC 6 Click Basic Mobility/ Daily Activity assessment daily.- Set and communicate daily mobility goal to care team and patient/family/caregiver. - Collaborate with rehabilitation services on mobility goals if consulted- Perform Range of Motion times a day.- Reposition patient every  hours.- Dangle patient  times a day- Stand patient  times a day- Ambulate patient  times a day- Out of bed to chair  times a day - Out of bed for meals times a day- Out of bed for toileting- Record patient progress and toleration of activity level   Outcome: Progressing     Problem: DISCHARGE PLANNING  Goal: Discharge to home or other facility with appropriate resources  Description: INTERVENTIONS:- Identify barriers to discharge w/patient and caregiver- Arrange for needed discharge resources and transportation as appropriate- Identify discharge learning needs (meds, wound  care, etc.)- Arrange for interpretive services to assist at discharge as needed- Refer to Case Management Department for coordinating discharge planning if the patient needs post-hospital services based on physician/advanced practitioner order or complex needs related to functional status, cognitive ability, or social support system  Outcome: Progressing     Problem: Knowledge Deficit  Goal: Patient/family/caregiver demonstrates understanding of disease process, treatment plan, medications, and discharge instructions  Description: Complete learning assessment and assess knowledge base.Interventions:- Provide teaching at level of understanding- Provide teaching via preferred learning methods  Outcome: Progressing

## 2025-04-01 VITALS
TEMPERATURE: 98.5 F | SYSTOLIC BLOOD PRESSURE: 114 MMHG | WEIGHT: 208.56 LBS | RESPIRATION RATE: 18 BRPM | BODY MASS INDEX: 34.75 KG/M2 | DIASTOLIC BLOOD PRESSURE: 61 MMHG | HEIGHT: 65 IN | HEART RATE: 76 BPM | OXYGEN SATURATION: 92 %

## 2025-04-01 LAB
ANION GAP SERPL CALCULATED.3IONS-SCNC: 7 MMOL/L (ref 4–13)
ANISOCYTOSIS BLD QL SMEAR: PRESENT
ANISOCYTOSIS BLD QL SMEAR: PRESENT
BASOPHILS # BLD MANUAL: 0 THOUSAND/UL (ref 0–0.1)
BASOPHILS # BLD MANUAL: 0.02 THOUSAND/UL (ref 0–0.1)
BASOPHILS NFR MAR MANUAL: 0 % (ref 0–1)
BASOPHILS NFR MAR MANUAL: 1 % (ref 0–1)
BUN SERPL-MCNC: 7 MG/DL (ref 5–25)
CALCIUM SERPL-MCNC: 8.5 MG/DL (ref 8.4–10.2)
CHLORIDE SERPL-SCNC: 106 MMOL/L (ref 96–108)
CO2 SERPL-SCNC: 27 MMOL/L (ref 21–32)
CREAT SERPL-MCNC: 0.53 MG/DL (ref 0.6–1.3)
DIFFERENTIAL COMMENT: ABNORMAL
EOSINOPHIL # BLD MANUAL: 0.12 THOUSAND/UL (ref 0–0.4)
EOSINOPHIL # BLD MANUAL: 0.16 THOUSAND/UL (ref 0–0.4)
EOSINOPHIL NFR BLD MANUAL: 5 % (ref 0–6)
EOSINOPHIL NFR BLD MANUAL: 9 % (ref 0–6)
ERYTHROCYTE [DISTWIDTH] IN BLOOD BY AUTOMATED COUNT: 13.1 % (ref 11.6–15.1)
GFR SERPL CREATININE-BSD FRML MDRD: 97 ML/MIN/1.73SQ M
GLUCOSE SERPL-MCNC: 85 MG/DL (ref 65–140)
HCT VFR BLD AUTO: 30.3 % (ref 34.8–46.1)
HGB BLD-MCNC: 10 G/DL (ref 11.5–15.4)
LYMPHOCYTES # BLD AUTO: 0.4 THOUSAND/UL (ref 0.6–4.47)
LYMPHOCYTES # BLD AUTO: 0.63 THOUSAND/UL (ref 0.6–4.47)
LYMPHOCYTES # BLD AUTO: 23 % (ref 14–44)
LYMPHOCYTES # BLD AUTO: 26 % (ref 14–44)
MAGNESIUM SERPL-MCNC: 1.8 MG/DL (ref 1.9–2.7)
MCH RBC QN AUTO: 37.3 PG (ref 26.8–34.3)
MCHC RBC AUTO-ENTMCNC: 33 G/DL (ref 31.4–37.4)
MCV RBC AUTO: 113 FL (ref 82–98)
METAMYELOCYTE ABSOLUTE CT: 0.15 THOUSAND/UL (ref 0–0.1)
METAMYELOCYTES NFR BLD MANUAL: 6 % (ref 0–1)
MONOCYTES # BLD AUTO: 0.29 THOUSAND/UL (ref 0–1.22)
MONOCYTES # BLD AUTO: 0.33 THOUSAND/UL (ref 0–1.22)
MONOCYTES NFR BLD: 12 % (ref 4–12)
MONOCYTES NFR BLD: 19 % (ref 4–12)
MYELOCYTE ABSOLUTE CT: 0.02 THOUSAND/UL (ref 0–0.1)
MYELOCYTE ABSOLUTE CT: 0.12 THOUSAND/UL (ref 0–0.1)
MYELOCYTES NFR BLD MANUAL: 1 % (ref 0–1)
MYELOCYTES NFR BLD MANUAL: 5 % (ref 0–1)
NEUTROPHILS # BLD MANUAL: 0.82 THOUSAND/UL (ref 1.85–7.62)
NEUTROPHILS # BLD MANUAL: 1.12 THOUSAND/UL (ref 1.85–7.62)
NEUTS BAND NFR BLD MANUAL: 4 % (ref 0–8)
NEUTS BAND NFR BLD MANUAL: 7 % (ref 0–8)
NEUTS SEG NFR BLD AUTO: 39 % (ref 43–75)
NEUTS SEG NFR BLD AUTO: 43 % (ref 43–75)
PATHOLOGY REVIEW: YES
PLATELET # BLD AUTO: 277 THOUSANDS/UL (ref 149–390)
PLATELET BLD QL SMEAR: ADEQUATE
PLATELET BLD QL SMEAR: ADEQUATE
PMV BLD AUTO: 10.1 FL (ref 8.9–12.7)
POTASSIUM SERPL-SCNC: 3.8 MMOL/L (ref 3.5–5.3)
PROCALCITONIN SERPL-MCNC: 0.36 NG/ML
RBC # BLD AUTO: 2.68 MILLION/UL (ref 3.81–5.12)
RBC MORPH BLD: PRESENT
RBC MORPH BLD: PRESENT
SODIUM SERPL-SCNC: 140 MMOL/L (ref 135–147)
WBC # BLD AUTO: 1.75 THOUSAND/UL (ref 4.31–10.16)

## 2025-04-01 PROCEDURE — 85027 COMPLETE CBC AUTOMATED: CPT

## 2025-04-01 PROCEDURE — 80048 BASIC METABOLIC PNL TOTAL CA: CPT

## 2025-04-01 PROCEDURE — 83735 ASSAY OF MAGNESIUM: CPT

## 2025-04-01 PROCEDURE — 84145 PROCALCITONIN (PCT): CPT | Performed by: INTERNAL MEDICINE

## 2025-04-01 PROCEDURE — 99239 HOSP IP/OBS DSCHRG MGMT >30: CPT | Performed by: INTERNAL MEDICINE

## 2025-04-01 PROCEDURE — 85007 BL SMEAR W/DIFF WBC COUNT: CPT

## 2025-04-01 RX ORDER — VANCOMYCIN HYDROCHLORIDE 125 MG/1
125 CAPSULE ORAL 4 TIMES DAILY
Qty: 42 CAPSULE | Refills: 0 | Status: SHIPPED | OUTPATIENT
Start: 2025-04-01 | End: 2025-04-12

## 2025-04-01 RX ORDER — MAGNESIUM SULFATE HEPTAHYDRATE 40 MG/ML
2 INJECTION, SOLUTION INTRAVENOUS ONCE
Status: COMPLETED | OUTPATIENT
Start: 2025-04-01 | End: 2025-04-01

## 2025-04-01 RX ADMIN — HYDROCHLOROTHIAZIDE 12.5 MG: 12.5 TABLET ORAL at 11:00

## 2025-04-01 RX ADMIN — MAGNESIUM SULFATE HEPTAHYDRATE 2 G: 40 INJECTION, SOLUTION INTRAVENOUS at 08:28

## 2025-04-01 RX ADMIN — VANCOMYCIN HYDROCHLORIDE 125 MG: 125 CAPSULE ORAL at 11:14

## 2025-04-01 RX ADMIN — ACETAMINOPHEN 975 MG: 325 TABLET, FILM COATED ORAL at 15:08

## 2025-04-01 RX ADMIN — GABAPENTIN 800 MG: 400 CAPSULE ORAL at 08:28

## 2025-04-01 RX ADMIN — LEVOTHYROXINE SODIUM 50 MCG: 0.05 TABLET ORAL at 05:43

## 2025-04-01 RX ADMIN — HEPARIN SODIUM 5000 UNITS: 5000 INJECTION INTRAVENOUS; SUBCUTANEOUS at 05:43

## 2025-04-01 RX ADMIN — VANCOMYCIN HYDROCHLORIDE 125 MG: 125 CAPSULE ORAL at 05:44

## 2025-04-01 NOTE — ASSESSMENT & PLAN NOTE
Concern for Sepsis POA as evidenced by fever, tachycardia, bandemia, hypotension   Suspected source: Intra-abdominal            Lab Results   Component Value Date     WBC 4.19 (L) 03/29/2025     HGB 9.8 (L) 03/29/2025     HCT 29.5 (L) 03/29/2025      (H) 03/29/2025      03/29/2025          Recent Labs     03/28/25  1548   LACTICACID 0.8   Bands 10% on admission   Procal 3.32  UA- Negative  Covid/Flu Rapid Ag- Negative  CT CAP w/con- Mild proximal colitis and severe mid to distal colitis, cannot exclude pseudomembranous colitis especially involving the mid to distal colon, Distended appendix measuring up to 8 mm without surrounding inflammatory changes   Cxray- Atelectasis   Received Cefepime and Vancomycin in ED   Was treated by outpatient oncologist for possible UTI with IV Levaquin and Ceftriaxone  -COVID flu RSV negative  C. Diff +     Plan-   No further indication for Zosyn will  discontinue  Continue oral vancomycin plan for 14 day course today day (4/14), she should continue this outpatient for an additional 42 doses  -She was advised to discontinue all alcohol use while on oral vancomycin as this could decrease the effectiveness of the antibiotic  -She was advised to continue drinking lots of fluids including electrolyte rich fluids such as Gatorade or propel into her diet  -She was advised if symptoms persist, or begin to worsen to report to the nearest emergency room for further evaluation  Blood Cultures negative 72 hours  Stool enteric panel negative

## 2025-04-01 NOTE — ASSESSMENT & PLAN NOTE
Lab Results   Component Value Date     [id number] 0.367 (L) 03/28/2025     FREET4 0.97 03/28/2025      TSH low however T4 wnl  Pt on levothyroxine 50mcg daily     Not likely cause of diarrhea   PLAN:  Continue levothyroxine

## 2025-04-01 NOTE — NURSING NOTE
Patient educated on discharge instructions. All questions, concerns, and comments by patient and  addressed and answered. Patient discharged home via ambulation accompanied by PCA and . Patient left with all personal belongings.

## 2025-04-01 NOTE — ASSESSMENT & PLAN NOTE
Lab Results   Component Value Date     IRON 137 08/19/2020     IRON 137 08/19/2020     FERRITIN 316 (H) 08/19/2020     TIBC 293 08/19/2020     TIBC 293 08/19/2020     CONCFE 47 (H) 08/19/2020     CONCFE 47 (H) 08/19/2020   Hx of JERARDO on Fe supplementation   However last labs indicate ACD  EGD 4/24- No overt bleeding  Colonoscopy 2021- Negative    Baseline appears to be around 9-10     Plan-   Likely secondary to immunosuppression and plasma cell dyscrasia   Transfuse <7

## 2025-04-01 NOTE — DISCHARGE SUMMARY
Discharge Summary - Hospitalist   Name: Anisa Long 70 y.o. female I MRN: [id number]  Unit/Bed#: ICU 15 I Date of Admission: 3/28/2025   Date of Service: 4/1/2025 I Hospital Day: 4     Assessment & Plan  Sepsis (HCC)  Concern for Sepsis POA as evidenced by fever, tachycardia, bandemia, hypotension   Suspected source: Intra-abdominal            Lab Results   Component Value Date     WBC 4.19 (L) 03/29/2025     HGB 9.8 (L) 03/29/2025     HCT 29.5 (L) 03/29/2025      (H) 03/29/2025      03/29/2025          Recent Labs     03/28/25  1548   LACTICACID 0.8   Bands 10% on admission   Procal 3.32  UA- Negative  Covid/Flu Rapid Ag- Negative  CT CAP w/con- Mild proximal colitis and severe mid to distal colitis, cannot exclude pseudomembranous colitis especially involving the mid to distal colon, Distended appendix measuring up to 8 mm without surrounding inflammatory changes   Cxray- Atelectasis   Received Cefepime and Vancomycin in ED   Was treated by outpatient oncologist for possible UTI with IV Levaquin and Ceftriaxone  -COVID flu RSV negative  C. Diff +     Plan-   No further indication for Zosyn will  discontinue  Continue oral vancomycin plan for 14 day course today day (4/14), she should continue this outpatient for an additional 42 doses  -She was advised to discontinue all alcohol use while on oral vancomycin as this could decrease the effectiveness of the antibiotic  -She was advised to continue drinking lots of fluids including electrolyte rich fluids such as Gatorade or propel into her diet  -She was advised if symptoms persist, or begin to worsen to report to the nearest emergency room for further evaluation  Blood Cultures negative 72 hours  Stool enteric panel negative  History of non-Hodgkin's lymphoma  Follows Dr. Ramos as outpatient  Lymphoma in remission   Was on Brukinsa for [place] Macroglobulinemia- stopped around 1 week ago due to diarrheal symptoms     Plan-   Will  hold medication   Follow up outpatient oncology   Hypothyroid  Lab Results   Component Value Date     [id number] 0.367 (L) 03/28/2025     FREET4 0.97 03/28/2025      TSH low however T4 wnl  Pt on levothyroxine 50mcg daily     Not likely cause of diarrhea   PLAN:  Continue levothyroxine  Postoperative malabsorption    Anemia  Lab Results   Component Value Date     IRON 137 08/19/2020     IRON 137 08/19/2020     FERRITIN 316 (H) 08/19/2020     TIBC 293 08/19/2020     TIBC 293 08/19/2020     CONCFE 47 (H) 08/19/2020     CONCFE 47 (H) 08/19/2020   Hx of JERARDO on Fe supplementation   However last labs indicate ACD  EGD 4/24- No overt bleeding  Colonoscopy 2021- Negative    Baseline appears to be around 9-10     Plan-   Likely secondary to immunosuppression and plasma cell dyscrasia   Transfuse <7   Essential hypertension  Currently holding home antihypertensives in setting of hypotension   -Patient states her blood pressure at home typically runs low between  systolically  History of Clostridium difficile infection  History of C. difficile approximately 5 years ago, she did just have recent antibiotic course with Levaquin and ceftriaxone    -See plan above  S/P bariatric surgery    Acute alcoholic intoxication in alcoholism (HCC)  Hx of alcohol abuse  Drinks 3 glasses a wine per day     PLAN:  CIWA for 72 hrs  Chronically on opiate therapy  Has been tapering off opioids for 3 weeks   -Will add 2.5 mg Marinol twice daily as needed for pain control, she uses medical marijuana at home  Chronic diarrhea  3 weeks of diarrhea yellow will wake her up around 4:00 am  Not related to Brukinsa as she has been on for a year and stopped after onset of diarrhea   Pt has been weaning herself off percocet for about 3 weeks possible etiology     Infx vs vs malabsorption complications for bariatric surgery, vs EtOH vs opioid withdrawal vs Hyperthyroidism     PLAN:  Continue infectious workup  Monitor for signs and symptoms of  withdrawal  CIWA  Potential GI consult  C. difficile colitis  See plan under sepsis     Medical Problems       Resolved Problems  Date Reviewed: 4/1/2025          Resolved    JN (acute kidney injury) ([facility]) 3/29/2025     Resolved by  Alfredo Olea MD    Shock ([facility]) 3/29/2025     Resolved by  Alfredo Olea MD    Acute respiratory failure with hypoxia ([facility]) 3/29/2025     Resolved by  Alfredo Olea MD    Electrolyte abnormality 3/31/2025     Resolved by  Terrie Valdovinos MD        Discharging Physician / Practitioner: Darek Cross Jr DO  PCP: Benjamin Choudhury MD  Admission Date:   Admission Orders (From admission, onward)       Ordered        03/28/25 2244  INPATIENT ADMISSION  Once                          Discharge Date: 04/01/25    Consultations During Hospital Stay:  None    Procedures Performed:   None    Significant Findings / Test Results:   C. difficile toxin by PCR positive, C. difficile toxins A+ B, EIA positive    Incidental Findings:   None     Test Results Pending at Discharge (will require follow up):   None      Outpatient Tests Requested:  None     Complications:  None     Reason for Admission: Diarrhea     Hospital Course:   Anisa Long is a 70 y.o. female patient who originally presented to the hospital on 3/28/2025 due to Diarrhea.  Patient initially presented after being febrile and hypotensive at her PCP, she has been having diarrhea 3 weeks prior.  In the ED her blood pressures were low which required pressor support with Levophed.  An attempt to wean her off was made after 5 hours however pressors remained low therefore remained on Levophed and required admission to the ICU.  She was started on Zosyn and vancomycin.  Urinalysis was negative, CT chest abdomen and pelvis showed mild proximal colitis and severe distal colitis.  Chest x-ray revealed evidence of bibasilar atelectasis.  She was able to be weaned off pressors, and then  "downgraded to [facility] level of care requiring maintenance fluids with isolate.  Her blood pressures have remained stable.  She will continue to have diarrhea which was improving throughout her hospital stay.  Zosyn was discontinued and vancomycin was continued, she completed 4 days of oral vancomycin, with a plan to complete 14 total days outpatient.  She was discharged in stable condition.  She was advised to avoid alcohol while taking vancomycin as this could decrease the effectiveness of the antibiotic.  She was also encouraged to continue using incentive spirometry for atelectasis.  She was also advised to drink lots of fluids and add additional Gatorade or propel to her diet to increase electrolytes and fluid intake.  She should follow-up with her PCP within 1 week.      Please see above list of diagnoses and related plan for additional information.     Condition at Discharge: stable    Discharge Day Visit / Exam:   Subjective: Patient seen and examined at bedside, no acute events overnight.  She is still endorses having diarrhea which is overall improved since yesterday.  She denies any abdominal pain.  She denies any fever or chills.  Overall she states she feels she is ready to go home.  Vitals: Blood Pressure: 114/61 (04/01/25 1457)  Pulse: 76 (04/01/25 1457)  Temperature: 98.5 °F (36.9 °C) (04/01/25 1457)  Temp Source: Oral (04/01/25 1457)  Respirations: 18 (04/01/25 1457)  Height: 5' 5\" (165.1 cm) (03/28/25 2346)  Weight - Scale: 94.6 kg (208 lb 8.9 oz) (04/01/25 0600)  SpO2: 92 % (04/01/25 1457)  Physical Exam  Constitutional:       General: She is not in acute distress.  HENT:      Mouth/Throat:      Mouth: Mucous membranes are moist.      Pharynx: Oropharynx is clear.   Cardiovascular:      Rate and Rhythm: Normal rate and regular rhythm.      Pulses: Normal pulses.      Heart sounds: Normal heart sounds. No murmur heard.  Pulmonary:      Effort: Pulmonary effort is normal.      Breath sounds: Normal " breath sounds.   Abdominal:      General: Bowel sounds are normal. There is no distension.      Palpations: Abdomen is soft.      Tenderness: There is no abdominal tenderness. There is no guarding.   Musculoskeletal:      Right lower leg: No edema.      Left lower leg: No edema.   Skin:     General: Skin is warm and dry.      Capillary Refill: Capillary refill takes less than 2 seconds.   Neurological:      General: No focal deficit present.      Mental Status: She is alert and oriented to person, place, and time.   Psychiatric:         Mood and Affect: Mood normal.         Behavior: Behavior normal.          Discussion with Family: Updated  () at bedside.    Discharge instructions/Information to patient and family:   See after visit summary for information provided to patient and family.      Provisions for Follow-Up Care:  See after visit summary for information related to follow-up care and any pertinent home health orders.      Mobility at time of Discharge:   Basic Mobility Inpatient Raw Score: 24  JH-HLM Goal: 8: Walk 250 feet or more  JH-HLM Achieved: 7: Walk 25 feet or more  HLM Goal NOT achieved. Continue to encourage mobility in post discharge setting.     Disposition:   Home    Planned Readmission: None     Discharge Medications:  See after visit summary for reconciled discharge medications provided to patient and/or family.      Administrative Statements   Discharge Statement:  I have spent a total time of 35 minutes in caring for this patient on the day of the visit/encounter. .    **Please Note: This note may have been constructed using a voice recognition system**

## 2025-04-01 NOTE — CASE MANAGEMENT
Case Management Discharge Planning Note    Patient name Anisa Long  Location ICU 15/ICU 15 MRN [id number]  : 1955 Date 2025       Current Admission Date: 3/28/2025  Current Admission Diagnosis:Sepsis (HCC)   Patient Active Problem List    Diagnosis Date Noted Date Diagnosed    C. difficile colitis 2025     Chronic diarrhea 2025     Sepsis (HCC) 2025     History of Clostridium difficile infection 2025     Morbid (severe) obesity due to excess calories ([facility]) 2024     Left upper quadrant abdominal pain 2024     Chronically on opiate therapy 2022     Headache 2021     Acute pulmonary embolism without acute cor pulmonale ([facility]) 2021     Status post right knee replacement 2021     Essential hypertension 2021     Right leg DVT ([facility]) 2021     Essential (hemorrhagic) thrombocythemia ([facility]) 2020     Anemia 2020     Scoliosis of thoracolumbar spine 2020     Intractable pain 2020     Chronic pulmonary embolism (HCC) 2020     Urinary retention 2020     Gastroesophageal reflux disease 2020     Atypical chest pain 2020     Drug-induced constipation 2020     History of non-Hodgkin's lymphoma 2020     Hypothyroid 2020     Low back pain 2020     Bariatric surgery status 2020     Obesity, Class II, BMI 35-39.9 2020     Postoperative malabsorption 2020     S/P bariatric surgery 2020     Acute alcoholic intoxication in alcoholism ([facility]) 2016     Alcohol dependence with intoxication, unspecified ([facility]) 2016       LOS (days): 4  Geometric Mean LOS (GMLOS) (days): 4.9  Days to GMLOS:1.3     OBJECTIVE:  Risk of Unplanned Readmission Score: 11.99         Current admission status: Inpatient   Preferred Pharmacy:   [facility] - [place], [place] - [address]  [address]  [address]  [facility]  [address]  Phone: [phone] Fax: [phone]    [id number] - [place], [place] - [address]  [address] [facility] [address]  Phone: [phone] Fax: [phone]    [facility] - [place], [place] - [address] [place] [address]  Phone: [phone] Fax: [phone]    Primary Care Provider: Benjamin Choudhury MD    Primary Insurance: [facility]  Secondary Insurance:     DISCHARGE DETAILS:    Other Referral/Resources/Interventions Provided:  Interventions: None Indicated    Treatment Team Recommendation: Home  Discharge Destination Plan:: Home  Transport at Discharge : Family    IMM Given (Date):: 04/01/25  IMM Given to:: Patient    IMM reviewed with patient, patient agrees with discharge determination.

## 2025-04-01 NOTE — PLAN OF CARE
Problem: PAIN - ADULT  Goal: Verbalizes/displays adequate comfort level or baseline comfort level  Description: Interventions:- Encourage patient to monitor pain and request assistance- Assess pain using appropriate pain scale- Administer analgesics based on type and severity of pain and evaluate response- Implement non-pharmacological measures as appropriate and evaluate response- Consider cultural and social influences on pain and pain management- Notify physician/advanced practitioner if interventions unsuccessful or patient reports new pain  Outcome: Progressing     Problem: INFECTION - ADULT  Goal: Absence or prevention of progression during hospitalization  Description: INTERVENTIONS:- Assess and monitor for signs and symptoms of infection- Monitor lab/diagnostic results- Monitor all insertion sites, i.e. indwelling lines, tubes, and drains- Monitor endotracheal if appropriate and nasal secretions for changes in amount and color- [place] appropriate cooling/warming therapies per order- Administer medications as ordered- Instruct and encourage patient and family to use good hand hygiene technique- Identify and instruct in appropriate isolation precautions for identified infection/condition  Outcome: Progressing  Goal: Absence of fever/infection during neutropenic period  Description: INTERVENTIONS:- Monitor WBC  Outcome: Progressing     Problem: DISCHARGE PLANNING  Goal: Discharge to home or other facility with appropriate resources  Description: INTERVENTIONS:- Identify barriers to discharge w/patient and caregiver- Arrange for needed discharge resources and transportation as appropriate- Identify discharge learning needs (meds, wound care, etc.)- Arrange for interpretive services to assist at discharge as needed- Refer to Case Management Department for coordinating discharge planning if the patient needs post-hospital services based on physician/advanced practitioner order or complex needs related to  functional status, cognitive ability, or social support system  Outcome: Progressing     Problem: Knowledge Deficit  Goal: Patient/family/caregiver demonstrates understanding of disease process, treatment plan, medications, and discharge instructions  Description: Complete learning assessment and assess knowledge base.Interventions:- Provide teaching at level of understanding- Provide teaching via preferred learning methods  Outcome: Progressing

## 2025-04-01 NOTE — CASE MANAGEMENT
Case Management Progress Note    Patient name Anisa Long  Location ICU 15/ICU 15 MRN [id number]  : 1955 Date 2025       LOS (days): 4  Geometric Mean LOS (GMLOS) (days): 4.9  Days to GMLOS:1.3        OBJECTIVE:        Current admission status: Inpatient  Preferred Pharmacy:   [facility] - [place] [address] [facility]  [address]  [address]  [facility] [address]  Phone: [phone] Fax: [phone]    [id number] - [place], [place] - [address]  [address] [facility] [address]  Phone: [phone] Fax: [phone]    [facility] - [place] [address]  Phone: [phone] Fax: [phone]    Primary Care Provider: Benjamin Choudhury MD    Primary Insurance: [employer]  Secondary Insurance:     PROGRESS NOTE:    CM received consult for ATIYA/OUD. CM contacted [facility]  to refer patient and provided minimal necessary information. CRS to meet with patient and follow up with CM to provide update on plan of care following patient connection.     CATCH CRS, Yessenia, met with pt. At this time she is not interested in help at this time. Yessenia did leave pt with her information in the event pt changes her mind.

## 2025-04-01 NOTE — DISCHARGE INSTR - AVS FIRST PAGE
Dear Anisa Long,     It was our pleasure to care for you here at [facility].  It is our hope that we were always able to exceed the expected standards for your care during your stay.  You were hospitalized due to diarrhea.  You were cared for on the 2nd floor by Darek Cross Jr, DO under the service of Terrie Amado* with the [facility] who covers for your primary care physician (PCP), Benjamin Choudhury MD, while you were hospitalized.  If you have any questions or concerns related to this hospitalization, you may contact us at .  For follow up as well as any medication refills, we recommend that you follow up with your primary care physician.  A registered nurse will reach out to you by phone within a few days after your discharge to answer any additional questions that you may have after going home.  However, at this time we provide for you here, the most important instructions / recommendations at discharge:     Notable Medication Adjustments -   Please start taking (1) vancomycin 125 mg tablet every 6 hours for 10 days, starting the first dose today at 6:00 PM  Please stop taking loperamide 2 mg tablets as this can worsen C. difficile infection causing diarrhea  Please stop taking lisinopril-hydrochlorothiazide 10-12.5 mg tablets as this may cause hypotension, please see your PCP regarding further management  Please continue taking the rest of your medications as prescribed  Testing Required after Discharge -   None  ** Please contact your PCP to request testing orders for any of the testing recommended here **  Important follow up information -   Please follow-up with your PCP within 1 week of discharge  Other Instructions -   Please continue to drink electrolyte rich fluids such as Gatorade, or propel in addition to drinking plenty of water to stay hydrated  If your diarrhea symptoms worsen, persist, if  you develop fevers, or abdominal pain please report to the nearest emergency room for further evaluation  Please refrain from alcohol use while on vancomycin as this can decrease the effectiveness of the antibiotic   Please review this entire after visit summary as additional general instructions including medication list, appointments, activity, diet, any pertinent wound care, and other additional recommendations from your care team that may be provided for you.      Sincerely,     Darek Cross Jr, DO

## 2025-04-01 NOTE — PLAN OF CARE
Problem: PAIN - ADULT  Goal: Verbalizes/displays adequate comfort level or baseline comfort level  Description: Interventions:- Encourage patient to monitor pain and request assistance- Assess pain using appropriate pain scale- Administer analgesics based on type and severity of pain and evaluate response- Implement non-pharmacological measures as appropriate and evaluate response- Consider cultural and social influences on pain and pain management- Notify physician/advanced practitioner if interventions unsuccessful or patient reports new pain  Outcome: Progressing     Problem: INFECTION - ADULT  Goal: Absence or prevention of progression during hospitalization  Description: INTERVENTIONS:- Assess and monitor for signs and symptoms of infection- Monitor lab/diagnostic results- Monitor all insertion sites, i.e. indwelling lines, tubes, and drains- Monitor endotracheal if appropriate and nasal secretions for changes in amount and color- [place] appropriate cooling/warming therapies per order- Administer medications as ordered- Instruct and encourage patient and family to use good hand hygiene technique- Identify and instruct in appropriate isolation precautions for identified infection/condition  Outcome: Progressing  Goal: Absence of fever/infection during neutropenic period  Description: INTERVENTIONS:- Monitor WBC  Outcome: Progressing     Problem: SAFETY ADULT  Goal: Patient will remain free of falls  Description: INTERVENTIONS:- Educate patient/family on patient safety including physical limitations- Instruct patient to call for assistance with activity - Consult OT/PT to assist with strengthening/mobility - Keep Call bell within reach- Keep bed low and locked with side rails adjusted as appropriate- Keep care items and personal belongings within reach- Initiate and maintain comfort rounds- Make Fall Risk Sign visible to staff  Outcome: Progressing  Goal: Maintain or return to baseline ADL  function  Description: INTERVENTIONS:-  Assess patient's ability to carry out ADLs; assess patient's baseline for ADL function and identify physical deficits which impact ability to perform ADLs (bathing, care of mouth/teeth, toileting, grooming, dressing, etc.)- Assess/evaluate cause of self-care deficits - Assess range of motion- Assess patient's mobility; develop plan if impaired- Assess patient's need for assistive devices and provide as appropriate- Encourage maximum independence but intervene and supervise when necessary- Involve family in performance of ADLs- Assess for home care needs following discharge - Consider OT consult to assist with ADL evaluation and planning for discharge- Provide patient education as appropriate  Outcome: Progressing  Goal: Maintains/Returns to pre admission functional level  Description: INTERVENTIONS:- Perform AM-PAC 6 Click Basic Mobility/ Daily Activity assessment daily.- Set and communicate daily mobility goal to care team and patient/family/caregiver. - Collaborate with rehabilitation services on mobility goals if consulted- Perform Range of Motion   Outcome: Progressing     Problem: DISCHARGE PLANNING  Goal: Discharge to home or other facility with appropriate resources  Description: INTERVENTIONS:- Identify barriers to discharge w/patient and caregiver- Arrange for needed discharge resources and transportation as appropriate- Identify discharge learning needs (meds, wound care, etc.)- Arrange for interpretive services to assist at discharge as needed- Refer to Case Management Department for coordinating discharge planning if the patient needs post-hospital services based on physician/advanced practitioner order or complex needs related to functional status, cognitive ability, or social support system  Outcome: Progressing     Problem: Knowledge Deficit  Goal: Patient/family/caregiver demonstrates understanding of disease process, treatment plan, medications, and discharge  instructions  Description: Complete learning assessment and assess knowledge base.Interventions:- Provide teaching at level of understanding- Provide teaching via preferred learning methods  Outcome: Progressing

## 2025-04-02 LAB
BACTERIA BLD CULT: NORMAL
BACTERIA BLD CULT: NORMAL

## 2025-04-02 NOTE — UTILIZATION REVIEW
NOTIFICATION OF ADMISSION DISCHARGE   This is a Notification of Discharge from [facility]. Please be advised that this patient has been discharge from our facility. Below you will find the admission and discharge date and time including the patient’s disposition.   UTILIZATION REVIEW CONTACT:  Utilization Review Assistants  Network Utilization Review Department  Phone: [phone] x carefully listen to the prompts. All voicemails are confidential.  Email: [email or web address]     ADMISSION INFORMATION  PRESENTATION DATE: 3/28/2025  3:24 PM  OBERVATION ADMISSION DATE: N/A  INPATIENT ADMISSION DATE: 3/28/25 10:44 PM   DISCHARGE DATE: 4/1/2025  4:04 PM   DISPOSITION:Home/Self Care    Network Utilization Review Department  ATTENTION: Please call with any questions or concerns to [phone] and carefully listen to the prompts so that you are directed to the right person. All voicemails are confidential.   For Discharge needs, contact Care Management DC Support Team at [phone] opt. 2  Send all requests for admission clinical reviews, approved or denied determinations and any other requests to dedicated fax number below belonging to the campus where the patient is receiving treatment. List of dedicated fax numbers for the Facilities:  FACILITY NAME UR FAX NUMBER   ADMISSION DENIALS (Administrative/Medical Necessity) [phone]   DISCHARGE SUPPORT TEAM ([facility]) [phone]   PARENT CHILD HEALTH (Maternity/NICU/Pediatrics) [phone]   [facility] [phone]   [facility] [phone]   [facility] [phone]   [facility] [phone]   [facility] [phone]   [facility] [phone]   [facility] [phone]   [facility] [phone]   [facility]  [facility] [phone]   Critical access hospital [phone]   [facility] [phone]   [facility] [phone]

## 2025-04-07 ENCOUNTER — TELEPHONE (OUTPATIENT)
Age: 70
End: 2025-04-07

## 2025-04-07 NOTE — TELEPHONE ENCOUNTER
Pt calling stating she was admitted to the hospital last Friday and had some test done. Pt wants to know what the plans for her from Dr. Jennings is. Pt is still having diarrhea. I warm transfer to gi nurse Bridget.

## 2025-04-07 NOTE — TELEPHONE ENCOUNTER
Please advise     Pt calling in, she was in the hospital recently for sepsis and asking for plan of care as she is still experiencing diarrhea several times a day and all night. Abdominal cramping after BM and BM are associated after meals. She is being treated for cdiff with vanco has 4 days left but no improvement. No n/v or fevers

## 2025-04-09 NOTE — TELEPHONE ENCOUNTER
Pt transferred to myself by Es.    Reviewed 's message with pt. Pt is currently being treated for C.Diff and reports she has been having diarrhea x 5 weeks; pt requests this to be noted in her chart. Reiterated x 3 that it can take 6 - 8 weeks post C.Diff treatment for bowels to improve. Per  if diarrhea is still significant GI office visit/colonoscopy may be warranted.

## 2025-04-09 NOTE — TELEPHONE ENCOUNTER
The patient called in to follow up on a recent call regarding her plan of care from her ED visit. I connected the patient with Gildardo, the RN, for further assistance.

## 2025-04-21 ENCOUNTER — TELEPHONE (OUTPATIENT)
Dept: RADIOLOGY | Facility: HOSPITAL | Age: 70
End: 2025-04-21

## 2025-04-21 RX ORDER — SODIUM CHLORIDE 9 MG/ML
75 INJECTION, SOLUTION INTRAVENOUS CONTINUOUS
Status: CANCELLED | OUTPATIENT
Start: 2025-04-21

## 2025-04-21 RX ORDER — VANCOMYCIN HYDROCHLORIDE 1 G/200ML
1000 INJECTION, SOLUTION INTRAVENOUS ONCE
Status: CANCELLED | OUTPATIENT
Start: 2025-04-21

## 2025-04-25 ENCOUNTER — HOSPITAL ENCOUNTER (OUTPATIENT)
Dept: RADIOLOGY | Facility: HOSPITAL | Age: 70
Discharge: HOME | End: 2025-04-25
Attending: INTERNAL MEDICINE
Payer: COMMERCIAL

## 2025-04-25 VITALS
OXYGEN SATURATION: 96 % | SYSTOLIC BLOOD PRESSURE: 121 MMHG | TEMPERATURE: 97.1 F | WEIGHT: 187.39 LBS | RESPIRATION RATE: 16 BRPM | BODY MASS INDEX: 31.22 KG/M2 | HEART RATE: 82 BPM | HEIGHT: 65 IN | DIASTOLIC BLOOD PRESSURE: 73 MMHG

## 2025-04-25 DIAGNOSIS — C85.90 NON-HODGKIN'S LYMPHOMA IN RELAPSE (HCC): ICD-10-CM

## 2025-04-25 PROCEDURE — 36561 INSERT TUNNELED CV CATH: CPT

## 2025-04-25 PROCEDURE — 76937 US GUIDE VASCULAR ACCESS: CPT | Performed by: RADIOLOGY

## 2025-04-25 PROCEDURE — 99152 MOD SED SAME PHYS/QHP 5/>YRS: CPT | Performed by: RADIOLOGY

## 2025-04-25 PROCEDURE — C1894 INTRO/SHEATH, NON-LASER: HCPCS

## 2025-04-25 PROCEDURE — 77001 FLUOROGUIDE FOR VEIN DEVICE: CPT | Performed by: RADIOLOGY

## 2025-04-25 PROCEDURE — 99152 MOD SED SAME PHYS/QHP 5/>YRS: CPT

## 2025-04-25 PROCEDURE — C1788 PORT, INDWELLING, IMP: HCPCS

## 2025-04-25 PROCEDURE — 99153 MOD SED SAME PHYS/QHP EA: CPT

## 2025-04-25 PROCEDURE — 36571 INSERT PICVAD CATH: CPT | Performed by: RADIOLOGY

## 2025-04-25 RX ORDER — VANCOMYCIN HYDROCHLORIDE 1 G/200ML
1000 INJECTION, SOLUTION INTRAVENOUS ONCE
Status: COMPLETED | OUTPATIENT
Start: 2025-04-25 | End: 2025-04-25

## 2025-04-25 RX ORDER — DIPHENHYDRAMINE HYDROCHLORIDE 50 MG/ML
INJECTION, SOLUTION INTRAMUSCULAR; INTRAVENOUS AS NEEDED
Status: DISCONTINUED | OUTPATIENT
Start: 2025-04-25 | End: 2025-04-26 | Stop reason: HOSPADM

## 2025-04-25 RX ORDER — IBUPROFEN 200 MG
1 CAPSULE ORAL 2 TIMES DAILY
COMMUNITY

## 2025-04-25 RX ORDER — VALACYCLOVIR HYDROCHLORIDE 1 G/1
1000 TABLET, FILM COATED ORAL 2 TIMES DAILY
COMMUNITY

## 2025-04-25 RX ORDER — MIDAZOLAM HYDROCHLORIDE 2 MG/2ML
INJECTION, SOLUTION INTRAMUSCULAR; INTRAVENOUS AS NEEDED
Status: DISCONTINUED | OUTPATIENT
Start: 2025-04-25 | End: 2025-04-26 | Stop reason: HOSPADM

## 2025-04-25 RX ORDER — FENTANYL CITRATE 50 UG/ML
INJECTION, SOLUTION INTRAMUSCULAR; INTRAVENOUS AS NEEDED
Status: DISCONTINUED | OUTPATIENT
Start: 2025-04-25 | End: 2025-04-26 | Stop reason: HOSPADM

## 2025-04-25 RX ORDER — SODIUM CHLORIDE 9 MG/ML
75 INJECTION, SOLUTION INTRAVENOUS CONTINUOUS
Status: DISCONTINUED | OUTPATIENT
Start: 2025-04-25 | End: 2025-04-26 | Stop reason: HOSPADM

## 2025-04-25 RX ADMIN — FENTANYL CITRATE 50 MCG: 50 INJECTION INTRAMUSCULAR; INTRAVENOUS at 10:52

## 2025-04-25 RX ADMIN — Medication 10 ML: at 10:54

## 2025-04-25 RX ADMIN — VANCOMYCIN HYDROCHLORIDE 1000 MG: 1 INJECTION, SOLUTION INTRAVENOUS at 09:46

## 2025-04-25 RX ADMIN — FENTANYL CITRATE 50 MCG: 50 INJECTION INTRAMUSCULAR; INTRAVENOUS at 10:42

## 2025-04-25 RX ADMIN — DIPHENHYDRAMINE HYDROCHLORIDE 50 MG: 50 INJECTION, SOLUTION INTRAMUSCULAR; INTRAVENOUS at 10:30

## 2025-04-25 RX ADMIN — SODIUM CHLORIDE 75 ML/HR: 0.9 INJECTION, SOLUTION INTRAVENOUS at 09:45

## 2025-04-25 RX ADMIN — MIDAZOLAM 1 MG: 1 INJECTION INTRAMUSCULAR; INTRAVENOUS at 10:52

## 2025-04-25 RX ADMIN — MIDAZOLAM 1 MG: 1 INJECTION INTRAMUSCULAR; INTRAVENOUS at 10:42

## 2025-04-25 NOTE — BRIEF OP NOTE (RAD/CATH)
INTERVENTIONAL RADIOLOGY PROCEDURE NOTE    Date: 4/25/2025    Procedure:   Procedure Summary       Date: 04/25/25 Room / Location: [facility] Interventional Radiology    Anesthesia Start:  Anesthesia Stop:     Procedure: IR PORT PLACEMENT Diagnosis:       Non-Hodgkin's lymphoma in relapse (HCC)      (Non-Hodgkin's Lymphoma)    Scheduled Providers:  Responsible Provider:     Anesthesia Type: Not recorded ASA Status: Not recorded            Preoperative diagnosis:   1. Non-Hodgkin's lymphoma in relapse (HCC)         Postoperative diagnosis: Same.    Surgeon: Darek Hernández MD     Assistant: None. No qualified resident was available.    Blood loss: minimal    Specimens: none     Findings: Left brachial vein arm port placed.    Complications: None immediate.    Anesthesia: conscious sedation and local

## 2025-04-25 NOTE — SEDATION DOCUMENTATION
It was noted that there was some redness tracing the vein from the IV.  Dr. Hernández made aware.  We decreased the flowrate on the Vancomycin and medicated with IV benadryl.

## 2025-04-25 NOTE — DISCHARGE INSTRUCTIONS
Procedural Sedation   WHAT YOU NEED TO KNOW:   Procedural sedation is medicine used during procedures to help you feel relaxed and calm. You will remember little to none of the procedure. After sedation you may feel tired, weak, or unsteady on your feet. You may also have trouble concentrating or short-term memory loss. These symptoms should go away in 24 hours or less.   DISCHARGE INSTRUCTIONS:   Call 911 or have someone else call for any of the following:   You have sudden trouble breathing.     You cannot be woken.     Contact Interventional Radiology at [phone]   [facility] PATIENTS: Contact Interventional Radiology at [phone] [place] PATIENTS: Contact Interventional Radiology at [phone]) if any of the following occur:      You have a severe headache or dizziness.     Your heart is beating faster than usual.    You have a fever or chills.     Your skin is itchy, swollen, or you have a rash.     You have nausea or are vomiting for more than 8 hours after the procedure.      You have questions or concerns about your condition or care.  Self-care:   Have someone stay with you for 24 hours. This person can drive you to errands and help you do things around the house. This person can also watch for problems.      Rest and do quiet activities for 24 hours. Do not exercise, ride a bike, or play sports. Stand up slowly to prevent dizziness and falls. Take short walks around the house with another person. Slowly return to your usual activities the next day.      Do not drive or use dangerous machines or tools for 24 hours. You may injure yourself or others. Examples include a lawnmower, saw, or drill. Do not return to work for 24 hours if you use dangerous machines or tools for work.      Do not make important decisions for 24 hours. For example, do not sign important papers or invest money.      Drink liquids as directed. Liquids help flush the sedation medicine out of your body. Ask how much liquid to drink  each day and which liquids are best for you.      Eat small, frequent meals to prevent nausea and vomiting. Start with clear liquids such as juice or broth. If you do not vomit after clear liquids, you can eat your usual foods.      Do not drink alcohol or take medicines that make you drowsy. This includes medicines that help you sleep and anxiety medicines. Ask your healthcare provider if it is safe for you to take pain medicine.  Follow up with your healthcare provider as directed: Write down your questions so you remember to ask them during your visits. Implanted Venous Access Port     WHAT YOU NEED TO KNOW:   An implanted venous access port is a device used to give treatments and take blood. It may also be called a central venous access device (CVAD). The port is a small container that is placed under your skin, usually in your upper chest. The port is attached to a catheter that enters a large vein.   DISCHARGE INSTRUCTIONS:   Resume your normal diet. Small sips of flat soda will help with mild nausea.  Prevent an infection:   Wash your hands often.  Use soap and water. Clean your hands before and after you care for your port. Remind everyone who cares for your port to wash their hands.   Check your skin for infection every day.  Look for redness, swelling, or fluid oozing from the port site.  Care for your port:   1. You may shower beginning 48 hours after procedure.     2.  Leave glue in place.    3. It is normal for some bruising to occur.    4. Use Tylenol for pain.    5. Limit use of arm on the side that your port was placed. Lift nothing heavier than 5 pounds for 1 week, and then gradually increase activity as tolerated.    6. DO NOT apply ointment, lotion or cream to port site until incision is healed. Allow glue to fall off. DO NOT attempt to peel glue from skin even it it begins to flake.     7. After the port incision is healed you may swim, bathe.  Notify the Interventional Radiologist if you have  any of the followin. Fever above 101 F    2. Increased redness or swelling after 1st day.     3. Increased pain after 1st day.    4. Any sign of infection (drainage from port site, skin separation, hot to touch).    5. Persistent nausea or vomiting.    Contact Interventional Radiology at [phone] ([facility] PATIENTS: Contact Interventional Radiology at [phone]) ([facility] PATIENTS: Contact Interventional Radiology at [phone]).

## 2025-04-25 NOTE — H&P
Interventional Radiology  History and Physical 4/25/2025     Anisa Long   1955   [id number]    Assessment/Plan:  Long-term IV access required for chemotherapy. Will place LUE arm port.    Problem List Items Addressed This Visit    None  Visit Diagnoses         Non-Hodgkin's lymphoma in relapse (HCC)        Relevant Orders    IR port placement               Subjective:     Patient ID: Anisa Long is a 70 y.o. female.    History of Present Illness  71 yo female with nonhodgkins lymphoma requires IV access for chemotherapy. She is requesting placement of an arm port.    Review of Systems   Constitutional: Negative.    HENT: Negative.     Eyes: Negative.    Respiratory: Negative.     Cardiovascular: Negative.    Gastrointestinal: Negative.    Endocrine: Negative.    Genitourinary: Negative.    Musculoskeletal:  Positive for neck pain.   Skin: Negative.    Allergic/Immunologic: Negative.    Neurological: Negative.    Hematological: Negative.    Psychiatric/Behavioral: Negative.           Past Medical History:   Diagnosis Date    Anemia     Cancer (HCC)     Colon polyp     Degenerative joint disease     Disease of thyroid gland     underactive    FHx: non-Hodgkin's lymphoma     macroglobulin anemia    GERD (gastroesophageal reflux disease)     History of back surgery     History of chemotherapy 2014    lymphoma - [id number]    Lumbar disc disease     Shingles 02/02/2024        Past Surgical History:   Procedure Laterality Date    ANKLE SURGERY      2004,2005,2006    BACK SURGERY  10/1996    BREAST BIOPSY Left 06/2012    benign    BUNIONECTOMY  08/18/2015    CARPAL TUNNEL RELEASE Left 03/1995    CARPAL TUNNEL RELEASE Right 12/1999    COLONOSCOPY      CT EPIDURAL STEROID INJECTION (TERRELL LUMBAR)  8/26/2016    DISCECTOMY SPINE CERVICAL ANTERIOR W/ ARTHROPLASTY      C3/C4    FOOT GANGLION EXCISION Left 10/27/2017    FOOT SURGERY  08/09/2011    ankle/foot sx    HEMORRHOID SURGERY  2009     JOINT REPLACEMENT      KNEE CARTILAGE SURGERY Right     MOUTH SURGERY      ROTATOR CUFF REPAIR Left 05/14/2008    AIME-EN-Y PROCEDURE  03/31/2008    TOTAL SHOULDER REPLACEMENT  06/24/2014    UPPER GASTROINTESTINAL ENDOSCOPY          Social History     Tobacco Use   Smoking Status Former    Types: Cigarettes   Smokeless Tobacco Never        Social History     Substance and Sexual Activity   Alcohol Use Yes    Alcohol/week: 5.0 standard drinks of alcohol    Types: 5 Glasses of wine per week    Comment: wine at night        Social History     Substance and Sexual Activity   Drug Use Yes    Types: Marijuana    Comment: Medical marijuana 03/01/2021; tincture and gummies, last used 4/25/25        Allergies   Allergen Reactions    Avelox [Moxifloxacin] Other (See Comments)     shock    Cefaclor Other (See Comments)    Nsaids Other (See Comments)     NSAIDs are contraindicated due to history of gastric bypass.    Other Reaction(s): Other (See Comments)      NSAIDs are contraindicated due to history of gastric bypass.    Sulfa Antibiotics GI Intolerance    Sulfamethoxazole-Trimethoprim Other (See Comments)    Talwin [Pentazocine] Dizziness    Vicodin [Hydrocodone-Acetaminophen] GI Intolerance    Ondansetron Rash       Current Outpatient Medications   Medication Sig Dispense Refill    acetaminophen (TYLENOL) 500 mg tablet Take 1 tablet (500 mg total) by mouth every 6 (six) hours as needed for mild pain  0    allopurinol (ZYLOPRIM) 100 mg tablet Take 100 mg by mouth daily      ascorbic acid (VITAMIN C) 250 MG tablet Take 100 mg by mouth daily       B Complex-Biotin-FA (B Complete) TABS Take by mouth      Biotin 1000 MCG CHEW Chew      calcium citrate (CALCITRATE) 950 (200 Ca) MG tablet Take 1 tablet by mouth 2 (two) times a day      Cholecalciferol (VITAMIN D3) 1,000 units tablet Take 2,000 Units by mouth 3 (three) times a week      colestipol (COLESTID) 1 g tablet Take 1 tablet (1 g total) by mouth 4 (four) times a day 120  tablet 5    cyclobenzaprine (FLEXERIL) 10 mg tablet 3 (three) times a day (Patient taking differently: 3 (three) times a day as needed)      fexofenadine (Allegra Allergy) 60 MG tablet Take 60 mg by mouth daily (Patient taking differently: Take 60 mg by mouth daily as needed seasonal)      fidaxomicin (Dificid) tablet Take 200 mg by mouth 2 (two) times a day      gabapentin (NEURONTIN) 300 mg capsule take 2 capsules by mouth three times a day (Patient taking differently: Take 800 mg by mouth 3 (three) times a day)      hydroCHLOROthiazide 12.5 mg tablet Take 12.5 mg by mouth every evening 5:00pm      Lactobacillus (ACIDOPHOLUS PO) Take by mouth      levothyroxine 50 mcg tablet Take 50 mcg by mouth daily      melatonin 1 mg Take 5 mg by mouth daily at bedtime      Multiple Vitamins-Minerals (ONE DAILY WOMENS 50 PLUS PO) One Daily Womens 50 Plus      Omega 3-6-9 Fatty Acids (OMEGA 3-6-9 COMPLEX) CAPS omega 3,6,9 combination no.7 92 mg (43 mg-22 [id number]) chew tablet      Potassium (POTASSIMIN) 75 MG TABS Take by mouth      sucralfate (CARAFATE) 1 g tablet Take 1 tablet (1 g total) by mouth 3 (three) times a day before meals 270 tablet 2    valACYclovir (VALTREX) 1,000 mg tablet Take 1,000 mg by mouth 2 (two) times a day      vitamin E (TOCOPHEROL) capsule Take 400 Units by mouth daily      Brukinsa 80 MG CAPS  (Patient taking differently: No sig reported)      Ferrous Sulfate Dried (FERROUS SULFATE CR PO) Take 65 mg by mouth every other day      [Paused] lisinopril-hydrochlorothiazide (PRINZIDE,ZESTORETIC) 10-12.5 MG per tablet lisinopril 10 mg-hydrochlorothiazide 12.5 mg tablet      [Paused] loperamide (IMODIUM A-D) 2 MG tablet Take 2 mg by mouth 4 (four) times a day as needed for diarrhea       Current Facility-Administered Medications   Medication Dose Route Frequency Provider Last Rate Last Admin    diphenhydrAMINE (BENADRYL) injection   Intravenous PRN Darek Hernández MD   50 mg at 04/25/25 1030    sodium  "chloride 0.9 % infusion  75 mL/hr Intravenous Continuous Darek Hernández MD 75 mL/hr at 04/25/25 0945 75 mL/hr at 04/25/25 0945    vancomycin (VANCOCIN) IVPB (premix in dextrose) 1,000 mg 200 mL  1,000 mg Intravenous Once Darek Hernández  mL/hr at 04/25/25 0946 1,000 mg at 04/25/25 0946          Objective:    Vitals:    04/25/25 0917   BP: 111/67   Pulse: 84   Resp: 16   Temp: (!) 97.1 °F (36.2 °C)   TempSrc: Temporal   SpO2: 97%   Weight: 85 kg (187 lb 6.3 oz)   Height: 5' 5\" (1.651 m)        Physical Exam  Vitals reviewed.   Constitutional:       Appearance: Normal appearance. She is normal weight.   HENT:      Head: Normocephalic and atraumatic.      Nose: Nose normal.      Mouth/Throat:      Mouth: Mucous membranes are moist.   Eyes:      Extraocular Movements: Extraocular movements intact.      Conjunctiva/sclera: Conjunctivae normal.   Cardiovascular:      Rate and Rhythm: Normal rate and regular rhythm.      Heart sounds: Normal heart sounds.   Pulmonary:      Effort: Pulmonary effort is normal.      Breath sounds: Normal breath sounds.   Abdominal:      General: Bowel sounds are normal.      Palpations: Abdomen is soft.   Musculoskeletal:         General: Normal range of motion.      Cervical back: Normal range of motion and neck supple.   Skin:     General: Skin is warm and dry.   Neurological:      Mental Status: She is alert and oriented to person, place, and time.   Psychiatric:         Mood and Affect: Mood normal.         Behavior: Behavior normal.         Thought Content: Thought content normal.         Judgment: Judgment normal.           No results found for: \"BNP\"   Lab Results   Component Value Date    WBC 1.75 (L) 04/01/2025    HGB 10.0 (L) 04/01/2025    HCT 30.3 (L) 04/01/2025     (H) 04/01/2025     04/01/2025     Lab Results   Component Value Date    INR 1.02 03/28/2025    INR 0.93 09/19/2021    INR 0.9 08/18/2021    PROTIME 14.1 03/28/2025    PROTIME 12.3 09/19/2021    " PROTIME 9.9 02/14/2019     Lab Results   Component Value Date    PTT 33 03/28/2025         I have personally reviewed pertinent imaging and laboratory results.     Code Status: Prior  Advance Directive and Living Will:      Power of : Yes  POLST:      This text is generated with voice recognition software. There may be translation, syntax,  or grammatical errors. If you have any questions, please contact the dictating provider.

## 2025-04-30 PROBLEM — A41.9 SEPSIS (HCC): Status: RESOLVED | Noted: 2025-03-28 | Resolved: 2025-04-30
